# Patient Record
Sex: FEMALE | Race: ASIAN | Employment: UNEMPLOYED | URBAN - METROPOLITAN AREA
[De-identification: names, ages, dates, MRNs, and addresses within clinical notes are randomized per-mention and may not be internally consistent; named-entity substitution may affect disease eponyms.]

---

## 2017-05-09 ENCOUNTER — TRANSFERRED RECORDS (OUTPATIENT)
Dept: HEALTH INFORMATION MANAGEMENT | Facility: CLINIC | Age: 82
End: 2017-05-09

## 2017-05-22 DIAGNOSIS — K21.9 GASTROESOPHAGEAL REFLUX DISEASE WITHOUT ESOPHAGITIS: ICD-10-CM

## 2017-05-22 RX ORDER — NICOTINE POLACRILEX 4 MG/1
20 GUM, CHEWING ORAL 2 TIMES DAILY
Qty: 180 TABLET | Status: CANCELLED | OUTPATIENT
Start: 2017-05-22

## 2017-05-22 NOTE — TELEPHONE ENCOUNTER
Called and spoke with pharmacist ([facility]) and d/c refill per Dr. Patel.

## 2017-05-22 NOTE — TELEPHONE ENCOUNTER
Patient on BID omeprazole since 2014- started for GERD/gastritis, no GI bleed/ulcer on chart review.  Recommend stopping PPI, can try H2 blocker if having reflux symptoms.    Schedule appt if questions/concerns    ORCHELLE martinez

## 2017-05-25 ENCOUNTER — OFFICE VISIT (OUTPATIENT)
Dept: FAMILY MEDICINE | Facility: CLINIC | Age: 82
End: 2017-05-25

## 2017-05-25 VITALS
OXYGEN SATURATION: 100 % | BODY MASS INDEX: 23.28 KG/M2 | TEMPERATURE: 97.8 F | WEIGHT: 102 LBS | HEART RATE: 54 BPM | DIASTOLIC BLOOD PRESSURE: 64 MMHG | RESPIRATION RATE: 16 BRPM | SYSTOLIC BLOOD PRESSURE: 167 MMHG

## 2017-05-25 DIAGNOSIS — M17.12 PRIMARY OSTEOARTHRITIS OF LEFT KNEE: Primary | ICD-10-CM

## 2017-05-25 NOTE — NURSING NOTE
DUE FOR:  Eye Exam referral  Labs due:A1C, Lipids, Urine Microalbumin, Creatinine - pt states not on any dm meds for a while now. Glucose around the 90s.  DM Foot exam  PHQ9  PCV13     name: Dariel Rodney Her  Language: Hmong  Agency: [employer]  Phone number: [phone]    The following medication was given:     MEDICATION: Kenalog 40 mg  SITE: Left Knee  DOSE: 40 mg  LOT #: [id number]  :  [place]  EXPIRATION DATE:  9/2018  NDC#: 0748-3504-62    NOTE: GIVEN BY PROVIDER.

## 2017-05-25 NOTE — PROGRESS NOTES
Vignesh Fallon presents with left knee pain, known osteoarthritis.  She had left knee steroid injection 1 year ago with significant pain relief and would like repeat injection today.     Consent: Affirmation of informed consent was signed and scanned into the medical record. Risks, benefits and alternatives were discussed. Patient's questions were elicited and answered.   Procedure safety checklist was completed:             Yes  Time Out (Pause for the Cause) completed:            Yes     The left knee was prepped  in the usual fashion. Significant changes of OA noted in bilateral knees, making landmarks somewhat difficult to identify.     INJECTION:    Using 3 cc of 1% lidocaine mixed with 1 ml of 40 mg of Kenalog, the knee joint was successfully injected without complication using a lateral peripatellar approach.  Patient experienced some relief of pain following injection.  Bandaid applied.  Routine care discussed.        Follow up: Patient was instructed to call if severe pain, redness, warmth or other concerns    Luz Maria Patel PGY3  Faculty:  Dr Redding present for entire procedure

## 2017-05-25 NOTE — PROGRESS NOTES
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)     Esophageal reflux     Goiter     Displacement of lumbar intervertebral disc without myelopathy     Herpes zoster     Essential hypertension     Hypoglycemia     Stiffness of joint, not elsewhere classified,  shoulder region     Calculus of kidney     Osteoarthritis     Osteoarthrosis, unspecified whether generalized or localized, involving lower leg     Plantar fascial fibromatosis     Diabetes mellitus, type 2 (H)     Antalgic gait       Current Outpatient Prescriptions   Medication Sig Dispense Refill     omeprazole 20 MG tablet Take 1 tablet (20 mg) by mouth 2 times daily Take 30-60 minutes before a meal. 180 tablet 1     gabapentin (NEURONTIN) 600 MG tablet Take 1 tablet (600 mg) by mouth 2 times daily 90 tablet 2     Nutritional Supplements (GLUCERNA 1.0 ISAIAH/FIBER) LIQD Take 1 Can by mouth 2 times daily 54282 mL 3     order for DME Equipment being ordered: Wheelchair 1 Device 0     aspirin 81 MG tablet Take 1 tablet (81 mg) by mouth daily 30 tablet 6     order for DME Equipment being ordered: Wheelchair 1 Device 0     Nutritional Supplements (ENSURE NUTRITION SHAKE) LIQD Take 1 Bottle by mouth 2 times daily 60 Bottle 3     Multiple Vitamins-Iron (DAILY-CHRIS/IRON) TABS Take 1 tablet by mouth daily 30 tablet 6     lisinopril (PRINIVIL,ZESTRIL) 2.5 MG tablet Take 1 tablet (2.5 mg) by mouth daily 30 tablet 6     metFORMIN (GLUCOPHAGE) 500 MG tablet Take  1 tablet (500 mg) by mouth daily (with breakfast) 60 tablet 6     diclofenac (VOLTAREN) 1 % GEL Apply 4 grams to knees or 2 grams to hands four times daily using enclosed dosing card. 100 g 1     ORDER FOR DME, SET TO LOCAL PRINT, Equipment being ordered: Crutches 1 Units 0     blood glucose test strip 1 strip by In Vitro route 2 times daily Use as directed 100 strip 2     ORDER FOR DME Equipment being ordered: walker 1 Device 0     Blood Glucose Monitoring Suppl (DoubleUp CONTOUR MONITOR) W/DEVICE KIT Check fasting glucose 4 times per day at meals and before bed. 1 kit 0     acetaminophen (TYLENOL) 325 MG tablet Take 2 tablets (650 mg) by mouth every 4 hours as needed for pain 100 tablet 0     Cholecalciferol (VITAMIN D) 2000 UNITS CAPS Take 1 capsule by mouth daily.       glucose blood VI test strips (Otelic CONTOUR NEXT TEST) strip Take test 2 times per day daily.       docusate sodium (COLACE) 100 MG capsule Take i capsule twice daily as needed for constipation.       MICROLET LANCETS MISC Use as directed.            Allergies   Allergen Reactions     No Known Allergies        No results found for this or any previous visit (from the past 24 hour(s)).         Review of Systems:   {ROS ACUTE:136468}          Physical Exam:     Vitals:    05/25/17 0951   BP: 167/64   Pulse: 54   Resp: 16   Temp: 97.8  F (36.6  C)   TempSrc: Oral   SpO2: 100%   Weight: 102 lb (46.3 kg)     Body mass index is 23.28 kg/(m^2).    {Exam Choice:284717}{Exam Choice:239744}    Office Visit on 11/06/2015   Component Date Value Ref Range Status     CK, Total 11/06/2015 61  30 - 190 U/L Final     Sed Rate 11/06/2015 28* 0 - 20 mm/hr Final     C-Reactive Protein 11/06/2015 0.5  0.0 - 0.8 mg/dL Final     WBC 11/06/2015 6.0  4.0 - 11.0 K/uL Final     Lymphocytes # 11/06/2015 1.4  0.8 - 5.3 K/uL Final     % Lymphocytes 11/06/2015 23.1  20.0 - 48.0 %L Final     Mid # 11/06/2015 0.4  0.0 - 2.2 K/uL Final     Mid % 11/06/2015 6.3  0.0 - 20.0 %M  Final     GRANULOCYTES # 11/06/2015 4.2  1.6 - 8.3 K/uL Final     % Granulocytes 11/06/2015 70.6  40.0 - 75.0 %G Final     RBC 11/06/2015 3.82  3.80 - 5.20 M/uL Final     Hemoglobin 11/06/2015 10.4* 11.7 - 15.7 g/dL Final     Hematocrit 11/06/2015 33.2* 35.0 - 47.0 % Final     MCV 11/06/2015 86.9  78.0 - 100.0 fL Final     MCH 11/06/2015 27.2  26.5 - 35.0 pg Final     MCHC 11/06/2015 31.3* 32.0 - 36.0 g/dL Final     Platelets 11/06/2015 287.0  150.0 - 450.0 K/uL Final     Glucose 11/06/2015 135.0* 60.0 - 109.0 mg/dL Final     Urea Nitrogen 11/06/2015 35.0* 7.0 - 30.0 mg/dL Final     Creatinine 11/06/2015 1.9* 0.6 - 1.3 mg/dL Final     Sodium 11/06/2015 132.0* 133.0 - 144.0 mmol/L Final     Potassium 11/06/2015 4.0  3.4 - 5.3 mmol/L Final     Chloride 11/06/2015 104.0  94.0 - 109.0 mmol/L Final     Carbon Dioxide 11/06/2015 24.0  20.0 - 32.0 mmol/L Final     Calcium 11/06/2015 8.8  8.5 - 10.4 mg/dL Final     eGFR Calculated (Non Black Referen* 11/06/2015 27.0* >60.0 mL/min Final     eGFR Calculated (Black Reference) 11/06/2015 32.7* >60.0 mL/min Final     Hemoglobin A1C 11/06/2015 6.5* 4.1 - 5.7 % Final     TSH 11/06/2015 0.18* 0.30 - 5.00 uIU/mL Final     Cholesterol 11/06/2015 129.0  <200.0 mg/dL Final     Triglycerides 11/06/2015 68.0  <150.0 mg/dL Final     HDL Cholesterol 11/06/2015 47.0* >50.0 mg/dL Final    If diabetic or CVD then reference range <100     VLDL-Cholesterol 11/06/2015 14.0  7.0 - 32.0 mg/dL Final     LDL Cholesterol Direct 11/06/2015 68.0  0.0 - 99.0 mg/dL Final     Cholesterol/HDL Ratio 11/06/2015 2.8  <5.0 RATIO Final       Assessment and Plan     {DM DIAG PICKLIST:630428}    Options for treatment and follow-up care were reviewed with the patient and/or guardian. Vignesh Leeanne and/or guardian engaged in the decision making process and verbalized understanding of the options discussed and agreed with the final plan.      Luz Maria Patel MD      Precepted today with: {PVPRECEPTORS:555713}

## 2017-05-25 NOTE — MR AVS SNAPSHOT
After Visit Summary   2017    Vignesh Fallon    MRN: [id number]           Patient Information     Date Of Birth          1935        Visit Information        Provider Department      2017 9:40 AM Luz Maria Patel MD [facility]        Today's Diagnoses     Primary osteoarthritis of left knee    -  1       Follow-ups after your visit        Who to contact     Please call your clinic at [phone] to:    Ask questions about your health    Make or cancel appointments    Discuss your medicines    Learn about your test results    Speak to your doctor   If you have compliments or concerns about an experience at your clinic, or if you wish to file a complaint, please contact [facility] Patient Relations at [phone] or email us at [email or web address]         Additional Information About Your Visit        MyChart Information     [employer] is an electronic gateway that provides easy, online access to your medical records. With [employer], you can request a clinic appointment, read your test results, renew a prescription or communicate with your care team.     To sign up for [employer] visit the website at [email or web address]   You will be asked to enter the access code listed below, as well as some personal information. Please follow the directions to create your username and password.     Your access code is: [email or web address]  Expires: 2017  4:58 PM     Your access code will  in 90 days. If you need help or a new code, please contact your [facility] or call [phone] for assistance.        Care EveryWhere ID     This is your Care EveryWhere ID. This could be used by other organizations to access your [place] medical records  [id number]        Your Vitals Were     Pulse Temperature Respirations Pulse Oximetry BMI (Body Mass Index)       54 97.8  F (36.6  C) (Oral) 16 100% 23.28 kg/m2        Blood  Pressure from Last 3 Encounters:   05/25/17 167/64   05/05/16 128/67   11/24/15 126/78    Weight from Last 3 Encounters:   05/25/17 102 lb (46.3 kg)   05/05/16 104 lb 12.8 oz (47.5 kg)   11/24/15 101 lb 3.2 oz (45.9 kg)              Today, you had the following     No orders found for display       Primary Care Provider Office Phone # Fax #    Luz Maria Cherry Patel [phone]       [facility] [address] [place] [address]        Thank you!     Thank you for choosing [facility]  for your care. Our goal is always to provide you with excellent care. Hearing back from our patients is one way we can continue to improve our services. Please take a few minutes to complete the written survey that you may receive in the mail after your visit with us. Thank you!             Your Updated Medication List - Protect others around you: Learn how to safely use, store and throw away your medicines at www.disposemymeds.org.          This list is accurate as of: 5/25/17  4:58 PM.  Always use your most recent med list.                   Brand Name Dispense Instructions for use    acetaminophen 325 MG tablet    TYLENOL    100 tablet    Take 2 tablets (650 mg) by mouth every 4 hours as needed for pain       aspirin 81 MG tablet     30 tablet    Take 1 tablet (81 mg) by mouth daily       * SPENCER CONTOUR NEXT test strip   Generic drug:  blood glucose monitoring      Take test 2 times per day daily.       * blood glucose monitoring test strip    no brand specified    100 strip    1 strip by In Vitro route 2 times daily Use as directed       blood glucose monitoring meter device kit     1 kit    Check fasting glucose 4 times per day at meals and before bed.       DAILY-CHRIS/IRON Tabs     30 tablet    Take 1 tablet by mouth daily       diclofenac 1 % Gel topical gel    VOLTAREN    100 g    Apply 4 grams to knees or 2 grams to hands four times daily using enclosed dosing card.       docusate sodium  100 MG capsule    COLACE     Take i capsule twice daily as needed for constipation.       * ENSURE NUTRITION SHAKE Liqd     60 Bottle    Take 1 Bottle by mouth 2 times daily       * GLUCERNA 1.0 ISAIAH/FIBER Liqd     97503 mL    Take 1 Can by mouth 2 times daily       gabapentin 600 MG tablet    NEURONTIN    90 tablet    Take 1 tablet (600 mg) by mouth 2 times daily       lisinopril 2.5 MG tablet    PRINIVIL/Zestril    30 tablet    Take 1 tablet (2.5 mg) by mouth daily       metFORMIN 500 MG tablet    GLUCOPHAGE    60 tablet    Take 1 tablet (500 mg) by mouth daily (with breakfast)       MICROLET LANCETS Misc      Use as directed.       omeprazole 20 MG tablet     180 tablet    Take 1 tablet (20 mg) by mouth 2 times daily Take 30-60 minutes before a meal.       * order for DME     1 Device    Equipment being ordered: walker       * order for DME     1 Units    Equipment being ordered: Crutches       * order for DME     1 Device    Equipment being ordered: Wheelchair       * order for DME     1 Device    Equipment being ordered: Wheelchair       vitamin D 2000 UNITS Caps      Take 1 capsule by mouth daily.       * Notice:  This list has 8 medication(s) that are the same as other medications prescribed for you. Read the directions carefully, and ask your doctor or other care provider to review them with you.

## 2017-05-25 NOTE — PROGRESS NOTES
Preceptor Attestation:  Patient's case reviewed and discussed with Luz Maria Patel MD Patient seen and discussed with the resident. and I was present for and supervised the entire procedure. I agree with assessment and plan of care.  Supervising Physician:  Noemí Redding DO  [facility]

## 2017-05-31 ENCOUNTER — DOCUMENTATION ONLY (OUTPATIENT)
Dept: FAMILY MEDICINE | Facility: CLINIC | Age: 82
End: 2017-05-31

## 2017-06-05 NOTE — PROGRESS NOTES
Visit to the client's home for annual health risk assessment and PCA reassessment.  An  was not needed.    Current situation/living environment/hospitalization: Vignesh is 82 yo Hmong female with h/o Antalgic Gait, DM II, OA, CKD III and Joint Stiffness. She continues to live with son and DIL. Her primary concern is OA pain in both knees which often becomes so severe she is unable to bear weight on it. Recently received Kenalog injection to left knee which is helping but right knee is still in pain. Rainer continues to walk hunched forward at almost 90 degree angle using a crutch under left armpit at all times. Denies any recent falls/hospitalizations. CM suspects poor med compliance and offered SNV along with PT. Clt would like CM to discuss with son, Ryland, before continuing. Also c/o constant fatigue and significant decrease in hearing this year. Hearing loss was evident when compared to previous assessments. Rainer declined hearing exam at this time. Vignesh has maintained weight via Glucerna 1-2x/day. Rainer is tearful today stating she still does not feel family is as active in her life as she d prefer. Rainer doesn t feel there is anything CM can do about this and is content with going to [facility] to help with feelings of isolation. Otherwise, no major changes to health and functionality when compared to last year.    Activities of daily living (ADL)/instrumental activities of daily living (IADL) and functional issues: Rainer is dependent on others for help with some ADLs such as dressing, bathing, mobility and transferring. She is also dependent on family for help with all IADLs.    Health concerns/updates: CM suspects poor med compliance and education. Today, rainer only had Omeprazole and Gabapentin to which she states she only takes Omeprazole. She doesn't believe she has any other meds. Discussed benefits of SNV for med management.    Additional info: Per [facility] request, CM will discuss with her son (Ryland) about initiating  SNV and PT services and see what he feels is best for Vignesh. If he agrees to these services, CM will initiate request. Clt reports that she has also run out of Glucerna as she needs new script. She is asking for CM to assist with this.    Client's Plan of Care consists of:  PCA (3.5 hours a day), Adult day care (5 days a month) and Supplies and equipment as needed  Discuss initiation of SNV and PT services with family member  Assist clt with changing pharmacy to one that will auto refill and deliver to clt's home  Assist with restarting Glucerna.  F/U in 6 month or PRN    Brady Freeman RN PHN  [facility] Managed Care Coordinator  [phone]

## 2017-06-14 ENCOUNTER — HOME CARE/HOSPICE - HEALTHEAST (OUTPATIENT)
Dept: HOME HEALTH SERVICES | Facility: HOME HEALTH | Age: 82
End: 2017-06-14

## 2017-06-14 DIAGNOSIS — K21.9 GASTROESOPHAGEAL REFLUX DISEASE WITHOUT ESOPHAGITIS: ICD-10-CM

## 2017-06-15 ENCOUNTER — TELEPHONE (OUTPATIENT)
Dept: FAMILY MEDICINE | Facility: CLINIC | Age: 82
End: 2017-06-15

## 2017-06-15 RX ORDER — NICOTINE POLACRILEX 4 MG/1
20 GUM, CHEWING ORAL 2 TIMES DAILY
Qty: 180 TABLET | Refills: 1 | Status: SHIPPED | OUTPATIENT
Start: 2017-06-15 | End: 2017-06-19

## 2017-06-15 NOTE — TELEPHONE ENCOUNTER
[facility] phone call message - order or referral request for patient:     Order or referral being requested: Need 2  Recent clinic notes and med list for the order that they had received.      Additional Comments: They received an order for skilled nursing but needs above fax to . Please call and advise.     OK to leave a message on voice mail? Yes    Primary language: Hmong      needed? Yes    Call taken on Ayana 15, 2017 at 12:18 PM by Rafiq Scott

## 2017-06-15 NOTE — TELEPHONE ENCOUNTER
Reviewed. This order was already faxed to [facility].     Called [facility] back and spoke with Jolanta, [facility]. Per Jolanta order received yesterday from clinic.     Informed Jolanta to have [facility] disregard order.

## 2017-06-16 ENCOUNTER — TELEPHONE (OUTPATIENT)
Dept: FAMILY MEDICINE | Facility: CLINIC | Age: 82
End: 2017-06-16

## 2017-06-16 NOTE — TELEPHONE ENCOUNTER
[facility] phone call message - order or referral request for patient:     Order or referral being requested: Verbal okay to start home care tomorrow instead of today, family is okay with that.       Additional Comments: Calling for order above. Please call and advise.     OK to leave a message on voice mail? Yes    Primary language: ong      needed? Yes    Call taken on June 16, 2017 at 10:12 AM by Rafiq Scott

## 2017-06-16 NOTE — TELEPHONE ENCOUNTER
Called, left detailed message on voice mail for Caitlin. Ok to delay start of home care until tomorrow 6/17/17. Iza XIONG

## 2017-06-17 ENCOUNTER — MEDICAL CORRESPONDENCE (OUTPATIENT)
Dept: HEALTH INFORMATION MANAGEMENT | Facility: CLINIC | Age: 82
End: 2017-06-17

## 2017-06-17 ENCOUNTER — DOCUMENTATION ONLY (OUTPATIENT)
Dept: CARE COORDINATION | Facility: CLINIC | Age: 82
End: 2017-06-17

## 2017-06-17 NOTE — PROGRESS NOTES
.[facility] now requests orders and shares plan of care/discharge summaries for some patients through [facility].  Please REPLY TO THIS MESSAGE in order to give authorization for orders when needed.  This is considered a verbal order, you will still receive a faxed copy of orders for signature.  Thank you for your assistance in improving collaboration for our patients.    ORDER.skill nursing 1 week 1, 2 week 2, 1 every other week 6 and 4 prn to assess/teach disease and symptoms mgmt, medication mgmt including setup and compliance, safety with mobility d/t increase fall risks, effective pain mgmt, nutrition/hydration, and skin integrity. PT to eval/treat safety with mobility, equipment needs, exercises, and fall preventions.    MD SUMMARY/PLAN OF CARE. Pt takes only Omeprazole. Clinician looked into epic and noticed other medications listed in profile. Need complete medication reconciliation, Rxs send to [facility] pharmacy to refill medications, and for pt to have assistant with setup. Will send details of summary on certification.    DISCHARGE SUMMARY    Please contact Steffanie Silva at [phone] with an update on complete med rec.

## 2017-06-19 ENCOUNTER — OFFICE VISIT (OUTPATIENT)
Dept: FAMILY MEDICINE | Facility: CLINIC | Age: 82
End: 2017-06-19

## 2017-06-19 ENCOUNTER — TELEPHONE (OUTPATIENT)
Dept: FAMILY MEDICINE | Facility: CLINIC | Age: 82
End: 2017-06-19

## 2017-06-19 VITALS
HEART RATE: 70 BPM | TEMPERATURE: 98.3 F | DIASTOLIC BLOOD PRESSURE: 79 MMHG | SYSTOLIC BLOOD PRESSURE: 158 MMHG | WEIGHT: 100.8 LBS | OXYGEN SATURATION: 97 % | BODY MASS INDEX: 23.01 KG/M2 | RESPIRATION RATE: 18 BRPM

## 2017-06-19 DIAGNOSIS — K21.9 GASTROESOPHAGEAL REFLUX DISEASE WITHOUT ESOPHAGITIS: ICD-10-CM

## 2017-06-19 DIAGNOSIS — G89.29 CHRONIC LOW BACK PAIN WITHOUT SCIATICA, UNSPECIFIED BACK PAIN LATERALITY: Primary | ICD-10-CM

## 2017-06-19 DIAGNOSIS — M54.50 CHRONIC LOW BACK PAIN WITHOUT SCIATICA, UNSPECIFIED BACK PAIN LATERALITY: Primary | ICD-10-CM

## 2017-06-19 DIAGNOSIS — G62.9 NEUROPATHY: ICD-10-CM

## 2017-06-19 RX ORDER — GABAPENTIN 600 MG/1
600 TABLET ORAL 2 TIMES DAILY
Qty: 90 TABLET | Status: CANCELLED | OUTPATIENT
Start: 2017-06-19

## 2017-06-19 RX ORDER — TRIAMCINOLONE ACETONIDE 40 MG/ML
40 INJECTION, SUSPENSION INTRA-ARTICULAR; INTRAMUSCULAR ONCE
Qty: 1 ML | Refills: 0 | OUTPATIENT
Start: 2017-06-19 | End: 2017-06-19

## 2017-06-19 RX ORDER — ACETAMINOPHEN 325 MG/1
650 TABLET ORAL EVERY 4 HOURS PRN
Qty: 100 TABLET | Refills: 0 | Status: SHIPPED | OUTPATIENT
Start: 2017-06-19 | End: 2017-08-17

## 2017-06-19 RX ORDER — NICOTINE POLACRILEX 4 MG/1
20 GUM, CHEWING ORAL DAILY PRN
Qty: 180 TABLET | Refills: 1 | COMMUNITY
Start: 2017-06-19 | End: 2017-11-30

## 2017-06-19 NOTE — PROGRESS NOTES
HPI:       Vignesh Fallon is a 82 year old  female with a significant past medical history of CKD, osteoarthritis of knees who presents for the new concern(s) of:    #Medication update:  Has home care starting, clinic received request from home care regarding refills of patient's medications, however clinic chart did not match pill bottles at home  -Brought her pills bottle, only has omeprazole- uses this as needed for reflux symptoms, does not take daily.  This is working well for her  -Patient states she is not interested in lab work today but will consider for next visit  -Feels well other than pain in her back and her right knee      #Osteoarthritis:  Right knee pain, s/p left knee injection 5/25/17 with great relief, requesting injection of right knee today           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 180 tablet 1     acetaminophen (TYLENOL) 325 MG tablet Take 2 tablets (650 mg) by mouth every 4 hours as needed for mild pain 100 tablet 0     order for DME  Equipment being ordered: Wheelchair 1 Device 0     order for DME Equipment being ordered: Wheelchair 1 Device 0     Nutritional Supplements (ENSURE NUTRITION SHAKE) LIQD Take 1 Bottle by mouth 2 times daily 60 Bottle 3     ORDER FOR DME, SET TO LOCAL PRINT, Equipment being ordered: Crutches 1 Units 0     ORDER FOR DME Equipment being ordered: walker 1 Device 0          Allergies   Allergen Reactions     No Known Allergies        No results found for this or any previous visit (from the past 24 hour(s)).         Review of Systems:   5-Point Review of Systems Negative -- Except as noted above.          Physical Exam:     Vitals:    06/19/17 1555 06/19/17 1602   BP: 172/69 158/79   Pulse: 70    Resp: 18    Temp: 98.3  F (36.8  C)    SpO2: 97%    Weight: 100 lb 12.8 oz (45.7 kg)      Body mass index is 23.01 kg/(m^2).    Gen alert pleasant NAD  HEENT large 5 cm goiter   Heart rrr no murmurs  Lungs clear no wheezing  Ext degenerative changes bilateral knees, no effusion or erythema  Neuro alert and oriented x 3, ambulates with use of cane    Assessment and Plan     Medication management:  Clinic chart updated with patient's meds- omeprazole daily prn and tylenol prn.  Updated list will be faxed to her home care provider.  Discussed option for blood work today to check on kidney function- patient declined stating she feels fine and would rather not have a blood draw- she will think about this for next time    Osteoarthritis, right knee:  Patient requesting knee injection today, see procedure note below      Vignesh Fallon presents with right knee pain, known osteoarthritis.  She had left knee steroid injection 2 months ago with significant pain relief and would like her right knee injected today.      Consent: Affirmation of informed consent was signed and scanned into the medical record. Risks, benefits and alternatives were discussed. Patient's questions were elicited and answered.   Procedure safety checklist was completed:              Yes  Time Out (Pause for the Cause) completed:            Yes      The right knee was prepped  in the usual fashion. Significant changes of OA noted in bilateral knees, making landmarks somewhat difficult to identify.      INJECTION:    Using 3 cc of 1% lidocaine mixed with 1 ml of 40 mg of Kenalog, the knee joint was successfully injected without complication using a lateral peripatellar approach.  Patient experienced some relief of pain following injection.  Bandaid applied.  Routine care discussed.          Follow up: Patient was instructed to call if severe pain, redness, warmth or other concerns    Options for treatment and follow-up care were reviewed with the patient and/or guardian. Vignesh Leeanne and/or guardian engaged in the decision making process and verbalized understanding of the options discussed and agreed with the final plan.      Luz Maria Patel MD      Precepted today with: Nain Roth MD

## 2017-06-19 NOTE — TELEPHONE ENCOUNTER
[facility] phone call message- general phone call:    Reason for call: She went to see Patient on Saturday to start home care but there was no medication at all at the home so she is wondering what medications Patient is taking and how she can go about medications to get it to her or have it at home for her and to contact the pharmacy to get medications for Patient. She states she put in a note in epic if the clinic can look at it. Please call and advise.     Return call needed: Yes    OK to leave a message on voice mail? Yes    Primary language: Hmong      needed? Yes    Call taken on June 19, 2017 at 10:22 AM by Rafiq Scott

## 2017-06-19 NOTE — MR AVS SNAPSHOT
After Visit Summary   2017    Vignesh Fallon    MRN: [id number]           Patient Information     Date Of Birth          1935        Visit Information        Provider Department      2017 3:40 PM Luz Maria Patel MD [facility]        Today's Diagnoses     Chronic low back pain without sciatica, unspecified back pain laterality    -  1    Gastroesophageal reflux disease without esophagitis        Osteoarthrosis involving lower leg           Follow-ups after your visit        Who to contact     Please call your clinic at [phone] to:    Ask questions about your health    Make or cancel appointments    Discuss your medicines    Learn about your test results    Speak to your doctor   If you have compliments or concerns about an experience at your clinic, or if you wish to file a complaint, please contact [facility] Patient Relations at [phone] or email us at [email or web address]         Additional Information About Your Visit        MyChart Information     PAKt is an electronic gateway that provides easy, online access to your medical records. With [employer], you can request a clinic appointment, read your test results, renew a prescription or communicate with your care team.     To sign up for PAKt visit the website at [email or web address]   You will be asked to enter the access code listed below, as well as some personal information. Please follow the directions to create your username and password.     Your access code is: [email or web address]  Expires: 2017  4:58 PM     Your access code will  in 90 days. If you need help or a new code, please contact your [facility] or call [phone] for assistance.        Care EveryWhere ID     This is your Care EveryWhere ID. This could be used by other organizations to access your [facility] medical records  [id number]        Your Vitals Were      Pulse Temperature Respirations Pulse Oximetry BMI (Body Mass Index)       70 98.3  F (36.8  C) 18 97% 23.01 kg/m2        Blood Pressure from Last 3 Encounters:   06/19/17 158/79   05/25/17 167/64   05/05/16 128/67    Weight from Last 3 Encounters:   06/19/17 100 lb 12.8 oz (45.7 kg)   05/25/17 102 lb (46.3 kg)   05/05/16 104 lb 12.8 oz (47.5 kg)              We Performed the Following     DRAIN/INJECT MEDIUM JOINT/BURSA     triamcinolone acetonide (KENALOG-40) injection 40 mg/mL (Charge)          Today's Medication Changes          These changes are accurate as of: 6/19/17 11:59 PM.  If you have any questions, ask your nurse or doctor.               These medicines have changed or have updated prescriptions.        Dose/Directions    acetaminophen 325 MG tablet   Commonly known as:  TYLENOL   This may have changed:  reasons to take this   Used for:  Chronic low back pain without sciatica, unspecified back pain laterality   Changed by:  Luz Maria Patel MD        Dose:  650 mg   Take 2 tablets (650 mg) by mouth every 4 hours as needed for mild pain   Quantity:  100 tablet   Refills:  0       ENSURE NUTRITION SHAKE Liqd   This may have changed:  Another medication with the same name was removed. Continue taking this medication, and follow the directions you see here.   Used for:  Hypoglycemia, unspecified   Changed by:  Luz Maria Patel MD        Dose:  1 Bottle   Take 1 Bottle by mouth 2 times daily   Quantity:  60 Bottle   Refills:  3       omeprazole 20 MG tablet   This may have changed:    - when to take this  - reasons to take this   Used for:  Gastroesophageal reflux disease without esophagitis   Changed by:  Luz Maria Patel MD        Dose:  20 mg   Take 1 tablet (20 mg) by mouth daily as needed Take 30-60 minutes before a meal.   Quantity:  180 tablet   Refills:  1       * triamcinolone acetonide 40 MG/ML injection   Commonly known as:  KENALOG-40   This may have changed:  You were already taking a  medication with the same name, and this prescription was added. Make sure you understand how and when to take each.   Used for:  Osteoarthrosis involving lower leg   Changed by:  Luz Maria Patel MD        Dose:  40 mg   1 mL (40 mg) by INTRA-ARTICULAR route once for 1 dose   Quantity:  1 mL   Refills:  0       * triamcinolone acetonide 40 MG/ML injection   Commonly known as:  KENALOG-40   This may have changed:  Another medication with the same name was added. Make sure you understand how and when to take each.   Used for:  Primary osteoarthritis of left knee   Changed by:  Luz Maria Patel MD        Dose:  40 mg   1 mL (40 mg) by INTRA-ARTICULAR route once for 1 dose   Quantity:  1 mL   Refills:  0       * Notice:  This list has 2 medication(s) that are the same as other medications prescribed for you. Read the directions carefully, and ask your doctor or other care provider to review them with you.      Stop taking these medicines if you haven't already. Please contact your care team if you have questions.     aspirin 81 MG tablet   Stopped by:  Luz Maria Patel MD           SPENCER CONTOUR NEXT test strip   Generic drug:  blood glucose monitoring   Stopped by:  Luz Maria Patel MD           blood glucose monitoring meter device kit   Stopped by:  Luz Maria Patel MD           blood glucose monitoring test strip   Commonly known as:  no brand specified   Stopped by:  Luz Maria Patel MD           DAILY-CHRIS/IRON Tabs   Stopped by:  Luz Maria Patel MD           diclofenac 1 % Gel topical gel   Commonly known as:  VOLTAREN   Stopped by:  Luz Maria Patel MD           docusate sodium 100 MG capsule   Commonly known as:  COLACE   Stopped by:  Luz Maria Patel MD           gabapentin 600 MG tablet   Commonly known as:  NEURONTIN   Stopped by:  Luz Maria Patel MD           lisinopril 2.5 MG tablet   Commonly known as:  PRINIVIL/Zestril   Stopped by:  Luz Maria Patel MD           metFORMIN 500 MG tablet    Commonly known as:  GLUCOPHAGE   Stopped by:  Luz Maria Patel MD           MICROLET LANCETS Misc   Stopped by:  Luz Maria Patel MD           vitamin D 2000 UNITS Caps   Stopped by:  Luz Maria Patel MD                Where to get your medicines      These medications were sent to [facility]/pharmacy #[id number] - [place], [place] - [address]  [address], [place] [address]    Hours:  24-hours Phone:  [phone]     acetaminophen 325 MG tablet         Some of these will need a paper prescription and others can be bought over the counter.  Ask your nurse if you have questions.     You don't need a prescription for these medications     triamcinolone acetonide 40 MG/ML injection                Primary Care Provider Office Phone # Fax #    Luz Maria Patel [phone]       [facility] [address] [place] [address]        Equal Access to Services     [facility] Center[facility] : Hadii anupam feng hadasho Soomaali, waaxda luqadaha, qaybta kaalmada adeegyada, waxay ashleyin haycrow garcia . So [facility] [phone].    ATENCIÓN: Si habla español, tiene a bonner disposición servicios gratuitos de asistencia lingüística. Llame al [phone].    We comply with applicable federal civil rights laws and [place] laws. We do not discriminate on the basis of race, color, national origin, age, disability sex, sexual orientation or gender identity.            Thank you!     Thank you for choosing [facility]  for your care. Our goal is always to provide you with excellent care. Hearing back from our patients is one way we can continue to improve our services. Please take a few minutes to complete the written survey that you may receive in the mail after your visit with us. Thank you!             Your Updated Medication List - Protect others around you: Learn how to safely use, store and throw away your medicines at www.disposemymeds.org.          This list is accurate as of: 6/19/17  11:59 PM.  Always use your most recent med list.                   Brand Name Dispense Instructions for use Diagnosis    acetaminophen 325 MG tablet    TYLENOL    100 tablet    Take 2 tablets (650 mg) by mouth every 4 hours as needed for mild pain    Chronic low back pain without sciatica, unspecified back pain laterality       ENSURE NUTRITION SHAKE Liqd     60 Bottle    Take 1 Bottle by mouth 2 times daily    Hypoglycemia, unspecified       omeprazole 20 MG tablet     180 tablet    Take 1 tablet (20 mg) by mouth daily as needed Take 30-60 minutes before a meal.    Gastroesophageal reflux disease without esophagitis       * order for DME     1 Device    Equipment being ordered: walker    Gait disturbance       * order for DME     1 Units    Equipment being ordered: Crutches    Preventative health care       * order for DME     1 Device    Equipment being ordered: Wheelchair    Pain of left lower leg       * order for DME     1 Device    Equipment being ordered: Wheelchair    Primary osteoarthritis involving multiple joints       * triamcinolone acetonide 40 MG/ML injection    KENALOG-40    1 mL    1 mL (40 mg) by INTRA-ARTICULAR route once for 1 dose    Osteoarthrosis involving lower leg       * triamcinolone acetonide 40 MG/ML injection    KENALOG-40    1 mL    1 mL (40 mg) by INTRA-ARTICULAR route once for 1 dose    Primary osteoarthritis of left knee       * Notice:  This list has 6 medication(s) that are the same as other medications prescribed for you. Read the directions carefully, and ask your doctor or other care provider to review them with you.

## 2017-06-19 NOTE — TELEPHONE ENCOUNTER
Per home care nurse's report of not seeing any medications at pt's home during visit, I called patient- no answer. I called her emergency contact/ her son- Placido who reports she takes her medications as prescribed but is out of meds and have not picked up refills from pharmacy yet. Verified pharmacy- as [facility] on [address]. I called [facility], pharmacist informs me last time Lisinopril and Metformin were picked up was  and rxs have now . Will route this encounter to Dr Patel for review and response. Please verify and indicate which medications pt should be on or continue. Would you like patient to come back in for re-evaluation to determine continued medication intake? Iza XIONG

## 2017-06-19 NOTE — TELEPHONE ENCOUNTER
Leif notified and will bring pt and all medications to appt this afternoon with Dr Patel. Once all medications are reviewed Dr Patel can determine medication refills. Iza XIONG

## 2017-06-19 NOTE — PROGRESS NOTES
Preceptor Attestation:  Patient's case reviewed and discussed with Luz Maria Patel MD  I agree with assessment and plan of care. I  Was present for the entire procedure.  Supervising Physician:  Nain Roth MD  [facility]

## 2017-06-21 ENCOUNTER — TRANSFERRED RECORDS (OUTPATIENT)
Dept: HEALTH INFORMATION MANAGEMENT | Facility: CLINIC | Age: 82
End: 2017-06-21

## 2017-06-26 RX ORDER — TRIAMCINOLONE ACETONIDE 40 MG/ML
40 INJECTION, SUSPENSION INTRA-ARTICULAR; INTRAMUSCULAR ONCE
Qty: 1 ML | Refills: 0 | OUTPATIENT
Start: 2017-06-26 | End: 2017-06-26

## 2017-08-03 ENCOUNTER — TRANSFERRED RECORDS (OUTPATIENT)
Dept: HEALTH INFORMATION MANAGEMENT | Facility: CLINIC | Age: 82
End: 2017-08-03

## 2017-08-03 ENCOUNTER — OFFICE VISIT (OUTPATIENT)
Dept: FAMILY MEDICINE | Facility: CLINIC | Age: 82
End: 2017-08-03

## 2017-08-03 VITALS
SYSTOLIC BLOOD PRESSURE: 180 MMHG | DIASTOLIC BLOOD PRESSURE: 68 MMHG | OXYGEN SATURATION: 99 % | TEMPERATURE: 97.6 F | HEART RATE: 53 BPM

## 2017-08-03 DIAGNOSIS — M54.5 ACUTE MIDLINE LOW BACK PAIN, WITH SCIATICA PRESENCE UNSPECIFIED: Primary | ICD-10-CM

## 2017-08-03 DIAGNOSIS — M48.50XA PATHOLOGIC COMPRESSION FRACTURE OF SPINE, INITIAL ENCOUNTER (H): ICD-10-CM

## 2017-08-03 RX ORDER — ACETAMINOPHEN 325 MG/1
325 TABLET ORAL EVERY 6 HOURS PRN
Qty: 100 TABLET | Refills: 0 | Status: SHIPPED | OUTPATIENT
Start: 2017-08-03 | End: 2018-03-23

## 2017-08-03 RX ORDER — ACETAMINOPHEN 325 MG/1
650 TABLET ORAL ONCE
Qty: 2 TABLET | Refills: 0
Start: 2017-08-03 | End: 2017-08-03

## 2017-08-03 NOTE — PATIENT INSTRUCTIONS
Take 325mg Tylenol (1 pill) every 6 hours scheduled - 6am, Noon, 6pm, midnight   You may take 2 before bed if needed    Come back sooner or go to the ED if you lose feeling in your leg(s), unable to urinate, severe back pain that isn't controlled with tylenol, or high fevers.

## 2017-08-03 NOTE — PROGRESS NOTES
"       HPI:       Vignesh Fallon is a 82 year old  female with complex medical hx significant for CKD, T2DM, osteoporosis, and renal calculous who presents for the following:    Back Pain:   - She's had severe pain for the last week.   - Started a month ago, but worsened in the last week.   - States: \"Feels like my back is broken\"   - Describes as a severe, sharp pain -- it was dull earlier in the month became sharp a week ago. Now it's dull when not moving, sharp with movement.   - Very difficult to walk, move around, and sleep  - Points to location as top of hip on left back side.   - Wasn't doing anything specific when got worse, no falls no known injuries.   - Hasn't had pain this bad in the past. Had kidney stone before, pain wasn't as bad and was higher  - Tried local anesthetic last night which allowed her to sleep. Tylenol helped temporarily - 325mg.   - Has been eating and drinking fluids normally, does not believe she's lost weight.  - No shooting pain, no numbness. No fevers, chills, nausea/vomiting, dysuria/hematuria, numbness or tingling.    T2DM:   - Medications: No medications, diet controlled currently  - Glucose checks: checks in the mornings, usually between , does experience symptomatic lows about 1-2x/month  - No secondary symptoms such as paresthesias, blurry vision (says vision is not good anyway), no open sores    A [facility]IN[facility]  was used for this visit         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 180 tablet 1     acetaminophen (TYLENOL) 325 MG tablet Take 2 tablets (650 mg) by mouth every 4 hours as needed for mild pain 100 tablet 0     order for DME Equipment being ordered: Wheelchair 1 Device 0     order for DME Equipment being ordered: Wheelchair 1 Device 0     Nutritional Supplements (ENSURE NUTRITION SHAKE) LIQD Take 1 Bottle by mouth 2 times daily 60 Bottle 3     ORDER FOR DME, SET TO LOCAL PRINT, Equipment being ordered: Crutches 1 Units 0     ORDER FOR DME Equipment being ordered: walker 1 Device 0       Social History     Social History     Marital status:      Spouse name: N/A     Number of children: N/A     Years of education: N/A     Occupational History     Not on file.     Social History Main Topics     Smoking status: Never Smoker     Smokeless tobacco: Not on file     Alcohol use No     Drug use: No     Sexual activity: Not on file     Other Topics Concern     Not on file     Social History Narrative   Living with son       Allergies   Allergen Reactions     No Known Allergies             Review of Systems:      ROS: 10 point ROS neg other than the symptoms noted above in the HPI.          Physical Exam:     Vitals:    08/03/17 0958   BP: 180/68   Pulse: 53   Temp: 97.6  F (36.4  C)   TempSrc: Oral   SpO2: 99%   BP Recheck 147/68 - she had difficulty walking and lots of pain trying to get to the exam room    GENERAL APPEARANCE: Seated in obvious discomfort, leaning over most of the time  EYES: moderate injection bilaterally of  cornea, clouding over the lens, senile arcus  NECK: enlargement of the thyroid on right side  RESP: Normal WOB, lungs clear to auscultation - no rales, rhonchi or wheezes  CV: Bradycardic with normal rhythm, normal S1 S2, no S3 or S4, no murmur, click or rub  ABDOMEN: soft, nontender, without hepatosplenomegaly or masses, bowel sounds normal  MS: extremities normal- no gross deformities noted, upper extremities with normal active range of motion, passive ROM in LE normal but unable to stand 2/2 pain, no pain with straightening of pt's legs when she was seated, no pain with palpation of spinal processes, lower back muscles tight b/l without pain with palpation, limited ROM of the spine    SKIN: no suspicious lesions or rashes and no ecchymoses; no open sores of the feet  NEURO: Alert, interactive, good memory and speech; CN II-XII grossly intacty; 5/5 strength in proximal and distal upper extremities; 5/5 strength in dorsiflexion of foot (rest of exam limited by pain); normal biceps and patellar reflexes, babinski down-going b/l; no loss of sensation to light touch in all extremities b/l    Imaging  My interpretation: Spine XR - Prominent osteoarthritic changes with spurring and vertebral body calcification, multiple vertebrae showing shortening, disc space shortening particularly L4/L5 and L3/L4 space, spondylolisthesis of L5/S1 and T12/L1 spaces. Acute angle change at T12 with vertebral body shortening likely represents a compression fracture. No other areas appear to be fractures  Official read pending    Assessment and Plan     Vignesh Fallon is an 83 yo female with pmhx including osteoporosis, vertebral fractures, CKD, T2DM, and renal stones who came to clinic for acute back pain. Notably, she was recently diagnosed with latent TB and is not taking medication, electing for repeat XRs. As this seemed to be an acute change on top of chronic back pain which limited mobility to a great extent, I felt imaging of the spine  was indicated due to concerns for fracture > spasm or renal stone. I also considered the possibility of TB osteomyelitis (as she has latent TB and is not taking medication) vs other osteo vs malignancy, though I had low suspicion as she had no recent weight loss, fevers, B-symptoms.     Acute midline low back pain, with sciatica presence unspecified: gave 650 tylenol prior to XR due to patient's discomfort  -     acetaminophen (TYLENOL) 325 MG tablet; Take 2 tablets (650 mg) by mouth once for 1 dose  -     XR PELVIS INCL HIP BILATERAL G/E 2 VW  -     XR Lumbar Spine 2-3 Views*    Pathologic compression fracture of spine, initial encounter (H): On my read, acute angle change and compression of L1 with fracture. Unprovoked fracture w/o neurologic compromise likely 2/2 osteoporosis. Tylenol has been effective already and would like to avoid narcotics with her age and medical history.  -     acetaminophen (TYLENOL) 325 MG tablet; Take 1 tablet (325 mg) by mouth every 6 hours as needed for mild pain (severe pain)    Options for treatment and follow-up care were reviewed with the patient and son. Vignesh Fallon and son engaged in the decision making process and verbalized understanding of the options discussed and agreed with the final plan.    Benjamin Rosenstein, MD, MA  [facility]  P: [phone]      Precepted today with: Navi Izaguirre MD

## 2017-08-03 NOTE — MR AVS SNAPSHOT
After Visit Summary   8/3/2017    Vignesh Fallon    MRN: [id number]           Patient Information     Date Of Birth          1935        Visit Information        Provider Department      8/3/2017 9:40 AM Rosenstein, Benjamin, MD [facility]        Today's Diagnoses     Acute midline low back pain, with sciatica presence unspecified    -  1    Pathologic compression fracture of spine, initial encounter (H)          Care Instructions    Take 325mg Tylenol (1 pill) every 6 hours scheduled - 6am, Noon, 6pm, midnight   You may take 2 before bed if needed    Come back sooner or go to the ED if you lose feeling in your leg(s), unable to urinate, severe back pain that isn't controlled with tylenol, or high fevers.           Follow-ups after your visit        Who to contact     Please call your clinic at [phone] to:    Ask questions about your health    Make or cancel appointments    Discuss your medicines    Learn about your test results    Speak to your doctor   If you have compliments or concerns about an experience at your clinic, or if you wish to file a complaint, please contact [facility] Patient Relations at [phone] or email us at [email or web address]         Additional Information About Your Visit        MyChart Information     [employer] is an electronic gateway that provides easy, online access to your medical records. With [employer], you can request a clinic appointment, read your test results, renew a prescription or communicate with your care team.     To sign up for [employer] visit the website at [email or web address]   You will be asked to enter the access code listed below, as well as some personal information. Please follow the directions to create your username and password.     Your access code is: [email or web address]  Expires: 2017  4:58 PM     Your access code will  in 90 days. If you need help or a new code, please contact your  [facility] or call [phone] for assistance.        Care EveryWhere ID     This is your Care EveryWhere ID. This could be used by other organizations to access your [place] medical records  [id number]        Your Vitals Were     Pulse Temperature Pulse Oximetry             53 97.6  F (36.4  C) (Oral) 99%          Blood Pressure from Last 3 Encounters:   08/03/17 180/68   06/19/17 158/79   05/25/17 167/64    Weight from Last 3 Encounters:   06/19/17 100 lb 12.8 oz (45.7 kg)   05/25/17 102 lb (46.3 kg)   05/05/16 104 lb 12.8 oz (47.5 kg)              We Performed the Following     XR Lumbar Spine 2-3 Views*     XR PELVIS INCL HIP BILATERAL G/E 2 VW          Today's Medication Changes          These changes are accurate as of: 8/3/17 12:01 PM.  If you have any questions, ask your nurse or doctor.               These medicines have changed or have updated prescriptions.        Dose/Directions    * acetaminophen 325 MG tablet   Commonly known as:  TYLENOL   This may have changed:  Another medication with the same name was added. Make sure you understand how and when to take each.   Used for:  Chronic low back pain without sciatica, unspecified back pain laterality   Changed by:  Luz Maria Patel MD        Dose:  650 mg   Take 2 tablets (650 mg) by mouth every 4 hours as needed for mild pain   Quantity:  100 tablet   Refills:  0       * acetaminophen 325 MG tablet   Commonly known as:  TYLENOL   This may have changed:  You were already taking a medication with the same name, and this prescription was added. Make sure you understand how and when to take each.   Used for:  Acute midline low back pain, with sciatica presence unspecified   Changed by:  Rosenstein, Benjamin, MD        Dose:  650 mg   Take 2 tablets (650 mg) by mouth once for 1 dose   Quantity:  2 tablet   Refills:  0       * Notice:  This list has 2 medication(s) that are the same as other medications prescribed for you.  Read the directions carefully, and ask your doctor or other care provider to review them with you.         Where to get your medicines      Some of these will need a paper prescription and others can be bought over the counter.  Ask your nurse if you have questions.     You don't need a prescription for these medications     acetaminophen 325 MG tablet                Primary Care Provider Office Phone # Fax #    Hope Tinsley [phone]       [facility] [address] [place] [address]        Equal Access to Services     [facility] NURA : Hadii aad ku hadasho Soomaali, waaxda luqadaha, qaybta kaalmada adeegyada, waxay idiin hayaan adeeg kharash la'crow . So [facility] [phone].    ATENCIÓN: Si habla español, tiene a bonner disposición servicios gratuitos de asistencia lingüística. [facility] [phone].    We comply with applicable federal civil rights laws and [place] laws. We do not discriminate on the basis of race, color, national origin, age, disability sex, sexual orientation or gender identity.            Thank you!     Thank you for choosing [facility]  for your care. Our goal is always to provide you with excellent care. Hearing back from our patients is one way we can continue to improve our services. Please take a few minutes to complete the written survey that you may receive in the mail after your visit with us. Thank you!             Your Updated Medication List - Protect others around you: Learn how to safely use, store and throw away your medicines at www.disposemymeds.org.          This list is accurate as of: 8/3/17 12:01 PM.  Always use your most recent med list.                   Brand Name Dispense Instructions for use Diagnosis    * acetaminophen 325 MG tablet    TYLENOL    100 tablet    Take 2 tablets (650 mg) by mouth every 4 hours as needed for mild pain    Chronic low back pain without sciatica, unspecified back pain laterality       *  acetaminophen 325 MG tablet    TYLENOL    2 tablet    Take 2 tablets (650 mg) by mouth once for 1 dose    Acute midline low back pain, with sciatica presence unspecified       ENSURE NUTRITION SHAKE Liqd     60 Bottle    Take 1 Bottle by mouth 2 times daily    Hypoglycemia, unspecified       omeprazole 20 MG tablet     180 tablet    Take 1 tablet (20 mg) by mouth daily as needed Take 30-60 minutes before a meal.    Gastroesophageal reflux disease without esophagitis       * order for DME     1 Device    Equipment being ordered: walker    Gait disturbance       * order for DME     1 Units    Equipment being ordered: Crutches    Preventative health care       * order for DME     1 Device    Equipment being ordered: Wheelchair    Pain of left lower leg       * order for DME     1 Device    Equipment being ordered: Wheelchair    Primary osteoarthritis involving multiple joints       * Notice:  This list has 6 medication(s) that are the same as other medications prescribed for you. Read the directions carefully, and ask your doctor or other care provider to review them with you.

## 2017-08-03 NOTE — PROGRESS NOTES
"       HPI:       Vignesh Fallon is a 82 year old  female { :190969} who presents {FOLLOW UP OR NEW CONCERN:689725}    Back Pain: For the last week. Month ago, but worsened in the last week. \"Feel like my back is broken\" Severe, sharp pain -- dull then became sharp a week ago. hurts at top of hip on back side. Dull when not moving, sharp with movement. Wasn't doing anything when got worse, no falls no known injuries. Hasn't had pain this bad in the past. Tried local anesthetic last night which allowed her to sleep. Tylenol helped temporarily - 325mg. No shooting pain, no numbness.   Hx of osteoarthritis.   Eating and drinking well  Has had kidney stones in the past, no dysuria/hematuria      T2DM:   - Medications: No medications, diet controlled  - Glucose checks: checks in the mornings, usually between , does experience lows about 1-2x/month  - Secondary symtpoms    {:067509}         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 180 tablet 1     acetaminophen (TYLENOL) " "325 MG tablet Take 2 tablets (650 mg) by mouth every 4 hours as needed for mild pain 100 tablet 0     order for DME Equipment being ordered: Wheelchair 1 Device 0     order for DME Equipment being ordered: Wheelchair 1 Device 0     Nutritional Supplements (ENSURE NUTRITION SHAKE) LIQD Take 1 Bottle by mouth 2 times daily 60 Bottle 3     ORDER FOR DME, SET TO LOCAL PRINT, Equipment being ordered: Crutches 1 Units 0     ORDER FOR DME Equipment being ordered: walker 1 Device 0       {SOCIAL HISTORY:590393567}       Allergies   Allergen Reactions     No Known Allergies        No results found for this or any previous visit (from the past 24 hour(s)).         Review of Systems:     {ROS DM :068862::\"C: NEGATIVE for fatigue, unexpected change in weight\",\"E: NEGATIVE for acute vision problems or changes\",\"R: NEGATIVE for significant cough or shortness of breath\",\"CV: NEGATIVE for chest pain, palpitations or new or worsening peripheral edema\",\"P: NEGATIVE for changes in mood or affect\"}          Physical Exam:     There were no vitals filed for this visit.  There is no height or weight on file to calculate BMI.    {EXAM -OPEN/NORMAL/ABNL:378093::\"GENERAL APPEARANCE: healthy, alert and no distress,\"}    BP recheck 147/68  No spinal tenderness  Tight lumbar muscles, but bilateral  Difficulty walking, getting up from a chair (unable), leaning over  Normal neuro    Assessment and Plan     There are no diagnoses linked to this encounter.    Options for treatment and follow-up care were reviewed with the ***. Vignesh Fallon ***engaged in the decision making process and verbalized understanding of the options discussed and agreed with the final plan.    Benjamin Rosenstein, MD, MA  [facility]  P: [phone]      Precepted today with: {pvpreceptors:410846}  "

## 2017-08-03 NOTE — PROGRESS NOTES
Preceptor Attestation:  Patient's case reviewed and discussed with Benjamin Rosenstein, MD Patient seen and discussed with the resident. and I personally reviewed the imaging and agree with the interpretation documented by the resident.. I agree with assessment and plan of care.  Supervising Physician:  Navi Izaguirre MD  [facility]

## 2017-08-03 NOTE — NURSING NOTE
name: Dariel Clale  Language: [employer]  Agency: [employer]  Phone number: [phone]      The following medication was given:     MEDICATION: Acetaminophen (Pain relief) 325mg  ROUTE: PO  SITE: mouth  DOSE: 650 mg  LOT #: [id number]  :  [employer].  EXPIRATION DATE:  3/1/2018  NDC#: 18860-659-67  Given By: Carl Her, CMA pt tolerated oral medication well  Dr. Redding was in clinic at the time of oral medication.

## 2017-08-11 ENCOUNTER — MEDICAL CORRESPONDENCE (OUTPATIENT)
Dept: HEALTH INFORMATION MANAGEMENT | Facility: CLINIC | Age: 82
End: 2017-08-11

## 2017-08-16 ENCOUNTER — MEDICAL CORRESPONDENCE (OUTPATIENT)
Dept: HEALTH INFORMATION MANAGEMENT | Facility: CLINIC | Age: 82
End: 2017-08-16

## 2017-08-17 ENCOUNTER — OFFICE VISIT (OUTPATIENT)
Dept: FAMILY MEDICINE | Facility: CLINIC | Age: 82
End: 2017-08-17

## 2017-08-17 VITALS
OXYGEN SATURATION: 100 % | HEIGHT: 55 IN | HEART RATE: 68 BPM | RESPIRATION RATE: 21 BRPM | TEMPERATURE: 98.1 F | WEIGHT: 96.5 LBS | SYSTOLIC BLOOD PRESSURE: 187 MMHG | BODY MASS INDEX: 22.33 KG/M2 | DIASTOLIC BLOOD PRESSURE: 77 MMHG

## 2017-08-17 DIAGNOSIS — E11.29 TYPE 2 DIABETES MELLITUS WITH OTHER DIABETIC KIDNEY COMPLICATION, WITHOUT LONG-TERM CURRENT USE OF INSULIN (H): ICD-10-CM

## 2017-08-17 DIAGNOSIS — N18.30 CHRONIC KIDNEY DISEASE, STAGE III (MODERATE) (H): ICD-10-CM

## 2017-08-17 DIAGNOSIS — E55.9 VITAMIN D DEFICIENCY: ICD-10-CM

## 2017-08-17 DIAGNOSIS — S32.010D CLOSED WEDGE COMPRESSION FRACTURE OF FIRST LUMBAR VERTEBRA WITH ROUTINE HEALING, SUBSEQUENT ENCOUNTER: Primary | ICD-10-CM

## 2017-08-17 DIAGNOSIS — G89.29 CHRONIC LOW BACK PAIN WITHOUT SCIATICA, UNSPECIFIED BACK PAIN LATERALITY: ICD-10-CM

## 2017-08-17 DIAGNOSIS — M54.50 CHRONIC LOW BACK PAIN WITHOUT SCIATICA, UNSPECIFIED BACK PAIN LATERALITY: ICD-10-CM

## 2017-08-17 PROBLEM — Z20.1 EXPOSURE TO TB: Status: ACTIVE | Noted: 2017-08-17

## 2017-08-17 LAB
BUN SERPL-MCNC: 38 MG/DL (ref 7–30)
CALCIUM SERPL-MCNC: 9 MG/DL (ref 8.5–10.4)
CHLORIDE SERPLBLD-SCNC: 109 MMOL/L (ref 94–109)
CO2 SERPL-SCNC: 21 MMOL/L (ref 20–32)
CREAT SERPL-MCNC: 2 MG/DL (ref 0.6–1.3)
EGFR CALCULATED (BLACK REFERENCE): 30.7 ML/MIN
EGFR CALCULATED (NON BLACK REFERENCE): 25.4 ML/MIN
GLUCOSE SERPL-MCNC: 113 MG/DL (ref 60–109)
POTASSIUM SERPL-SCNC: 4 MMOL/L (ref 3.4–5.3)
SODIUM SERPL-SCNC: 138 MMOL/L (ref 133–144)

## 2017-08-17 RX ORDER — IBUPROFEN 400 MG/1
400 TABLET, FILM COATED ORAL EVERY 8 HOURS PRN
Qty: 120 TABLET | Refills: 1 | Status: SHIPPED | OUTPATIENT
Start: 2017-08-17 | End: 2017-11-30 | Stop reason: ALTCHOICE

## 2017-08-17 NOTE — MR AVS SNAPSHOT
After Visit Summary   8/17/2017    Vignesh Fallon    MRN: [id number]           Patient Information     Date Of Birth          1935        Visit Information        Provider Department      8/17/2017 8:20 AM Rosenstein, Benjamin, MD [facility]        Today's Diagnoses     Closed wedge compression fracture of first lumbar vertebra with routine healing, subsequent encounter    -  1    Chronic kidney disease, stage III (moderate)        Vitamin D deficiency        Type 2 diabetes mellitus with other diabetic kidney complication, without long-term current use of insulin (H)          Care Instructions    I'm sorry you're still having pain. I do believe this will get better over time, but it will take some weeks. Take the tylenol 650mg every 4 hours as needed. I am going to check your kidney function today. If this is ok, you can take 600-800mg of ibuprofen every 4 hours as well, alternating with the tylenol (that is. Tylenol 650, wait 4 hours, ibuprofen, wait 4 hours, tylenol, and so on)    We will see you again in two weeks to see how you're doing. Call us earlier if you're still having significant problems.           Follow-ups after your visit        Who to contact     Please call your clinic at [phone] to:    Ask questions about your health    Make or cancel appointments    Discuss your medicines    Learn about your test results    Speak to your doctor   If you have compliments or concerns about an experience at your clinic, or if you wish to file a complaint, please contact [facility] Patient Relations at [phone] or email us at [email or web address]         Additional Information About Your Visit        MyChart Information     [employer] is an electronic gateway that provides easy, online access to your medical records. With [employer], you can request a clinic appointment, read your test results, renew a prescription or communicate with your care  "team.     To sign up for [employer] visit the website at [email or web address]   You will be asked to enter the access code listed below, as well as some personal information. Please follow the directions to create your username and password.     Your access code is: [email or web address]  Expires: 2017  4:58 PM     Your access code will  in 90 days. If you need help or a new code, please contact your [facility] or call [phone] for assistance.        Care EveryWhere ID     This is your Care EveryWhere ID. This could be used by other organizations to access your [place] medical records  [id number]        Your Vitals Were     Pulse Temperature Respirations Height Pulse Oximetry BMI (Body Mass Index)    68 98.1  F (36.7  C) (Oral) 21 4' 4\" (132.1 cm) 100% 25.09 kg/m2       Blood Pressure from Last 3 Encounters:   17 187/77   17 180/68   17 158/79    Weight from Last 3 Encounters:   17 96 lb 8 oz (43.8 kg)   17 100 lb 12.8 oz (45.7 kg)   17 102 lb (46.3 kg)              We Performed the Following     Basic Metabolic Panel ([facility]) - Results < 1 hr     Glycosylated Hgb A1C ([facility])     [facility]        Primary Care Provider Office Phone # Fax #    Hope Tinsley [phone]       [facility] [address] [place] [address]        Equal Access to Services     [facility]: Hadii aad ku hadasho Soomaali, waaxda luqadaha, qaybta kaalmada adeegyada, ben garcia . So [facility] [phone].    ATENCIÓN: Si habla español, tiene a bonner disposición servicios gratuitos de asistencia lingüística. Llame al [phone].    We comply with applicable federal civil rights laws and [place] laws. We do not discriminate on the basis of race, color, national origin, age, disability sex, sexual orientation or gender identity.            Thank you!     Thank you for " choosing [facility]  for your care. Our goal is always to provide you with excellent care. Hearing back from our patients is one way we can continue to improve our services. Please take a few minutes to complete the written survey that you may receive in the mail after your visit with us. Thank you!             Your Updated Medication List - Protect others around you: Learn how to safely use, store and throw away your medicines at www.disposemymeds.org.          This list is accurate as of: 8/17/17  9:42 AM.  Always use your most recent med list.                   Brand Name Dispense Instructions for use Diagnosis    * acetaminophen 325 MG tablet    TYLENOL    100 tablet    Take 2 tablets (650 mg) by mouth every 4 hours as needed for mild pain    Chronic low back pain without sciatica, unspecified back pain laterality       * acetaminophen 325 MG tablet    TYLENOL    100 tablet    Take 1 tablet (325 mg) by mouth every 6 hours as needed for mild pain (severe pain)    Pathologic compression fracture of spine, initial encounter (H)       ENSURE NUTRITION SHAKE Liqd     60 Bottle    Take 1 Bottle by mouth 2 times daily    Hypoglycemia, unspecified       omeprazole 20 MG tablet     180 tablet    Take 1 tablet (20 mg) by mouth daily as needed Take 30-60 minutes before a meal.    Gastroesophageal reflux disease without esophagitis       * order for DME     1 Device    Equipment being ordered: walker    Gait disturbance       * order for DME     1 Units    Equipment being ordered: Crutches    Preventative health care       * order for DME     1 Device    Equipment being ordered: Wheelchair    Pain of left lower leg       * order for DME     1 Device    Equipment being ordered: Wheelchair    Primary osteoarthritis involving multiple joints       * Notice:  This list has 6 medication(s) that are the same as other medications prescribed for you. Read the directions carefully, and ask your doctor or other care  provider to review them with you.

## 2017-08-17 NOTE — PATIENT INSTRUCTIONS
I'm sorry you're still having pain. I do believe this will get better over time, but it will take some weeks. Take the tylenol 650mg every 4 hours as needed. I am going to check your kidney function today. If this is ok, you can take 600-800mg of ibuprofen every 4 hours as well, alternating with the tylenol (that is. Tylenol 650, wait 4 hours, ibuprofen, wait 4 hours, tylenol, and so on)    We will see you again in two weeks to see how you're doing. Call us earlier if you're still having significant problems.

## 2017-08-17 NOTE — LETTER
August 21, 2017      Vignesh Fallon  [address] [place] [address]        Maye Medellin     Your kidney function is stable. I talked to your son about this already and you should be ok to take the ibuprofen as we talked about when you were here.     Your diabetes lab (Hemogobin A1c) is just fine where it is. We do not need to start any medications for diabetes again.     Your Vitamin D level is pretty low. This is not immediately a problem but we should discuss some supplements you could take at your next visit.     Please call with any other concerns.    Please see below for your test results.    Resulted Orders   Basic Metabolic Panel ([facility]) - Results < 1 hr   Result Value Ref Range    Glucose 113.0 (H) 60.0 - 109.0 mg/dL    Urea Nitrogen 38.0 (H) 7.0 - 30.0 mg/dL    Creatinine 2.0 (H) 0.6 - 1.3 mg/dL    Sodium 138.0 133.0 - 144.0 mmol/L    Potassium 4.0 3.4 - 5.3 mmol/L    Chloride 109.0 94.0 - 109.0 mmol/L    Carbon Dioxide 21.0 20.0 - 32.0 mmol/L    Calcium 9.0 8.5 - 10.4 mg/dL    eGFR Calculated (Non Black Reference) 25.4 (L) >60.0 mL/min    eGFR Calculated (Black Reference) 30.7 (L) >60.0 mL/min   Glycosylated Hgb A1C ([facility])   Result Value Ref Range    Hemoglobin A1C 6.8 (H) 4.2 - 6.1 %    Narrative    Test performed by:  [facility]  [address] [place] [address]   Vitamin D 25-Hydroxy ([facility])   Result Value Ref Range    Vitamin D,25-Hydroxy 17.9 (L) 30.0 - 80.0 ng/mL    Narrative    Test performed by:  [facility]  [address] [place] [address]  Deficiency <10.0 ng/mL  Insufficiency 10.0-29.9 ng/mL  Sufficiency 30.0-80.0 ng/mL  Toxicity (possible) >100.0 ng/mL       If you have any questions, please call the clinic to make an appointment.    Sincerely,    Benjamin Rosenstein, MD

## 2017-08-17 NOTE — PROGRESS NOTES
HPI:       Vignesh Fallon is a 82 year old  Female w/complex medical hx including CKD, T2DM, osteoporosis who presents for follow up of concern(s) listed below:    Back Pain: Still having significant pain in back. Again describes shooting and sharp pains associated with movement. The scheduled 325mg tylenol was working before, just after seeing me previously. She would take two tabs if the pain was worse, taking one and if that didn't help taking a second about 30min later. She has had to increase the dose such that she has taken 650mg TID now multiple times. She gets around at home with a walker and wheel chair. Family helps her with stairs. She feels she would be unable to participate in PT with the pain and would have difficulty making appointments. She has not had any new fevers, urinary or bowel symptoms, or worsening weakness of her legs.     Mood: She reveals she feels quite sad and depressed, particularly after hurting her back. She states she is worried about her health and feels she will not get better. She would like feel better and not be in pain. She has these feelings daily. She still enjoys her usual activities and has tried to work in the garden the last two weeks, though it's been difficulty. She notes a decreased appetite, but eats regularly. She has no thoughts of self harm.     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)     Esophageal reflux     Goiter     Displacement of lumbar intervertebral disc without myelopathy     Herpes zoster     Essential hypertension      "Hypoglycemia     Stiffness of joint, not elsewhere classified,  shoulder region     Calculus of kidney     Osteoarthritis     Osteoarthrosis involving lower leg     Plantar fascial fibromatosis     Diabetes mellitus, type 2 (H)     Antalgic gait       Current Outpatient Prescriptions   Medication Sig Dispense Refill     acetaminophen (TYLENOL) 325 MG tablet Take 2 tablets (650 mg) by mouth every 4 hours as needed for mild pain 100 tablet 0     acetaminophen (TYLENOL) 325 MG tablet Take 1 tablet (325 mg) by mouth every 6 hours as needed for mild pain (severe pain) 100 tablet 0     omeprazole 20 MG tablet Take 1 tablet (20 mg) by mouth daily as needed Take 30-60 minutes before a meal. 180 tablet 1     order for DME Equipment being ordered: Wheelchair 1 Device 0     order for DME Equipment being ordered: Wheelchair 1 Device 0     Nutritional Supplements (ENSURE NUTRITION SHAKE) LIQD Take 1 Bottle by mouth 2 times daily 60 Bottle 3     ORDER FOR DME, SET TO LOCAL PRINT, Equipment being ordered: Crutches 1 Units 0     ORDER FOR DME Equipment being ordered: walker 1 Device 0       Lives at home with children and grandchildren. Requires help in the home for many activities.        Allergies   Allergen Reactions     No Known Allergies        No results found for this or any previous visit (from the past 24 hour(s)).         Review of Systems:    ROS: 10 point ROS neg other than the symptoms noted above in the HPI.            Physical Exam:     Vitals:    08/17/17 0836   BP: 187/77   Pulse: 68   Resp: 21   Temp: 98.1  F (36.7  C)   TempSrc: Oral   SpO2: 100%   Weight: 96 lb 8 oz (43.8 kg)   Height: 4' 4\" (132.1 cm)     Body mass index is 25.09 kg/(m^2).    GENERAL APPEARANCE: Older woman appearing stated age seated in her wheelchair, leaning over and resting on her knees, in obvious discomfort.   NECK: Asymmetry with chronic swelling of right side, no pain or rigidity  RESP: Normal WOB, lungs clear to auscultation - no " rales, rhonchi or wheezes  CV: regular rates and rhythm, normal S1 S2, no S3 or S4 and no murmur, click or rub  ABDOMEN: soft, nontender, without hepatosplenomegaly or masses and bowel sounds normal  MS: Back with kyphosis and moderate tenderness to palpation along lumbar area, mild  SKIN: no suspicious lesions or rashes  NEURO: Alert, interactive, answers questions appropriately; normal plantar flexion and dorsiflexion, limited ability to straighten legs while seated due to pain. No loss of sensation in proximal or distal extremities.  PSYCH: Decreased affect with associated mood      Assessment and Plan     Leeanne Medellin is an 83 yo female with complex medical history who was found to have a likely acute vertebral compression fracture returning to clinic for follow-up of acute back pain and fracture    Closed wedge compression fracture of first lumbar vertebra with routine healing, subsequent encounter: Reviewed XR findings with Leeanne. Continues to have severe pain but is relieved by Tylenol. Will change to scheduled 650mg tylenol and alternate with ibuprofen after checking renal function (Cr was found to be stable). Will try to avoid opioids due to fall/confusion risk and side-effects  -     ibuprofen (ADVIL/MOTRIN) 400 MG tablet; Take 1 tablet (400 mg) by mouth every 8 hours as needed for moderate pain    Chronic kidney disease, stage III (moderate): Cr of 2.0, stable since 2015. Will give low dose ibuprofen to alternate with tylenol. She is not on an ACE-I.  -     Basic Metabolic Panel ([facility]) - Results < 1 hr    Vitamin D deficiency: Hx of osteoporosis per chart, unprovoked vertebral compression fx supports diagnosis in absence of DEXA. Will check Vitamin D level today, may add supplement with follow up.       -     Vitamin D 25-Hydroxy ([facility])    Type 2 diabetes mellitus with other diabetic kidney complication, without long-term current use of insulin (H): No medication, last A1c was 6.5 in 2015. Recheck  today as well as glucose.   -     Basic Metabolic Panel ([facility]) - Results < 1 hr  -     Glycosylated Hgb A1C ([facility])    Options for treatment and follow-up care were reviewed with the patient. Vignesh Fallon engaged in the decision making process and verbalized understanding of the options discussed and agreed with the final plan.    Benjamin Rosenstein, MD, MA  [facility]  P: [phone]      Precepted today with: Marielle De La Paz MD

## 2017-08-18 LAB
25(OH)D3 SERPL-MCNC: 17.9 NG/ML (ref 30–80)
HBA1C MFR BLD: 6.8 % (ref 4.2–6.1)

## 2017-08-18 RX ORDER — ACETAMINOPHEN 325 MG/1
650 TABLET ORAL EVERY 4 HOURS PRN
Qty: 100 TABLET | Refills: 11 | Status: SHIPPED | OUTPATIENT
Start: 2017-08-18 | End: 2018-03-23

## 2017-08-28 NOTE — PROGRESS NOTES
Preceptor Attestation:  Patient's case reviewed and discussed with Benjamin Rosenstein, MD.  Patient seen and discussed with the resident.  I agree with assessment and plan of care.  Supervising Physician:  Marielle De La Paz MD  [facility]

## 2017-11-17 DIAGNOSIS — S32.010D CLOSED WEDGE COMPRESSION FRACTURE OF FIRST LUMBAR VERTEBRA WITH ROUTINE HEALING, SUBSEQUENT ENCOUNTER: ICD-10-CM

## 2017-11-21 RX ORDER — IBUPROFEN 400 MG/1
400 TABLET, FILM COATED ORAL EVERY 8 HOURS PRN
Qty: 120 TABLET | OUTPATIENT
Start: 2017-11-21

## 2017-11-22 NOTE — TELEPHONE ENCOUNTER
Have patient come see Dr. Tinsley when back. She should not be on ibuprofen because of her kidney function.

## 2017-11-27 NOTE — TELEPHONE ENCOUNTER
Called and informed patient with the denial of Ibuprofen and patient agree to return to clinic and will forward the message to the  to schedule appointment.

## 2017-11-30 ENCOUNTER — OFFICE VISIT (OUTPATIENT)
Dept: FAMILY MEDICINE | Facility: CLINIC | Age: 82
End: 2017-11-30

## 2017-11-30 VITALS
BODY MASS INDEX: 22.77 KG/M2 | RESPIRATION RATE: 19 BRPM | WEIGHT: 98.38 LBS | SYSTOLIC BLOOD PRESSURE: 147 MMHG | HEIGHT: 55 IN | TEMPERATURE: 97.9 F | DIASTOLIC BLOOD PRESSURE: 70 MMHG | HEART RATE: 66 BPM | OXYGEN SATURATION: 100 %

## 2017-11-30 DIAGNOSIS — M17.11 PRIMARY LOCALIZED OSTEOARTHROSIS OF RIGHT LOWER LEG: Primary | ICD-10-CM

## 2017-11-30 DIAGNOSIS — K21.9 GASTROESOPHAGEAL REFLUX DISEASE WITHOUT ESOPHAGITIS: ICD-10-CM

## 2017-11-30 DIAGNOSIS — E11.49 DIABETES MELLITUS TYPE 2 WITH NEUROLOGICAL MANIFESTATIONS (H): ICD-10-CM

## 2017-11-30 DIAGNOSIS — N18.30 CHRONIC KIDNEY DISEASE, STAGE III (MODERATE) (H): ICD-10-CM

## 2017-11-30 DIAGNOSIS — I10 ESSENTIAL HYPERTENSION: ICD-10-CM

## 2017-11-30 LAB
BUN SERPL-MCNC: 44 MG/DL (ref 7–30)
CALCIUM SERPL-MCNC: 9.1 MG/DL (ref 8.5–10.4)
CHLORIDE SERPLBLD-SCNC: 105 MMOL/L (ref 94–109)
CO2 SERPL-SCNC: 24 MMOL/L (ref 20–32)
CREAT SERPL-MCNC: 2.2 MG/DL (ref 0.6–1.3)
EGFR CALCULATED (BLACK REFERENCE): 27.5 ML/MIN
EGFR CALCULATED (NON BLACK REFERENCE): 22.7 ML/MIN
GLUCOSE SERPL-MCNC: 97 MG/DL (ref 60–109)
POTASSIUM SERPL-SCNC: 4.1 MMOL/L (ref 3.4–5.3)
SODIUM SERPL-SCNC: 139 MMOL/L (ref 133–144)

## 2017-11-30 RX ORDER — TRIAMCINOLONE ACETONIDE 40 MG/ML
40 INJECTION, SUSPENSION INTRA-ARTICULAR; INTRAMUSCULAR ONCE
Qty: 1 ML | Refills: 0 | OUTPATIENT
Start: 2017-11-30 | End: 2017-11-30

## 2017-11-30 RX ORDER — NICOTINE POLACRILEX 4 MG/1
20 GUM, CHEWING ORAL DAILY PRN
Qty: 180 TABLET | Refills: 1 | Status: SHIPPED | OUTPATIENT
Start: 2017-11-30 | End: 2018-05-04 | Stop reason: ALTCHOICE

## 2017-11-30 ASSESSMENT — PATIENT HEALTH QUESTIONNAIRE - PHQ9: SUM OF ALL RESPONSES TO PHQ QUESTIONS 1-9: 9

## 2017-11-30 NOTE — MR AVS SNAPSHOT
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  Please schedule a nurse visit for BP recheck in two weeks. If any of your labs are abnormal we will call or send a letter. The knee should feel better soon!    **If you had lab testing today and your results are reassuring or normal they will be be mailed to you within 7 days.   **If the lab tests need quick action we will call you with the results.  The phone number we will call with results is # [phone] (home) . If this is not the best number please call our clinic and change the number.  If you need any refills please call your pharmacy and they will contact us.  If you have any concerns about today's visit or wish to schedule another appointment please call our office during normal business hours [phone] (8-5:00 M-F)  If you have urgent medical concerns please call [phone] at any time of the day.  If you a medical emergency please call [phone]  Again thank you for choosing [facility] and please let us know how we can best partner with you to improve you and your family's health.              Follow-ups after your visit        Follow-up notes from your care team     Return in about 2 weeks (around 12/14/2017) for BP Recheck.      Who to contact     Please call your clinic at [phone] to:    Ask questions about your health    Make or cancel  "appointments    Discuss your medicines    Learn about your test results    Speak to your doctor   If you have compliments or concerns about an experience at your clinic, or if you wish to file a complaint, please contact [facility] Patient Relations at [phone] or email us at [email or web address]         Additional Information About Your Visit        [employer] Information     [employer] is an electronic gateway that provides easy, online access to your medical records. With [employer], you can request a clinic appointment, read your test results, renew a prescription or communicate with your care team.     To sign up for [employer] visit the website at [email or web address]   You will be asked to enter the access code listed below, as well as some personal information. Please follow the directions to create your username and password.     Your access code is: [email or web address]  Expires: 2018  2:00 PM     Your access code will  in 90 days. If you need help or a new code, please contact your [facility] or call [phone] for assistance.        Care EveryWhere ID     This is your Care EveryWhere ID. This could be used by other organizations to access your [facility] medical records  [id number]        Your Vitals Were     Pulse Temperature Respirations Height Pulse Oximetry BMI (Body Mass Index)    66 97.9  F (36.6  C) (Oral) 19 4' 5.5\" (135.9 cm) 100% 24.16 kg/m2       Blood Pressure from Last 3 Encounters:   17 147/70   17 187/77   17 180/68    Weight from Last 3 Encounters:   17 98 lb 6 oz (44.6 kg)   17 96 lb 8 oz (43.8 kg)   17 100 lb 12.8 oz (45.7 kg)              We Performed the Following     Basic Metabolic Panel ([facility]) - Results < 1 hr     DRAIN/INJECT LARGE JOINT/BURSA     TRIAMCINOLONE ACET INJ NOS     XR KNEE BILATERAL 1/2 VW          Today's Medication Changes          These changes are " accurate as of: 11/30/17 11:59 PM.  If you have any questions, ask your nurse or doctor.               Start taking these medicines.        Dose/Directions    triamcinolone acetonide 40 MG/ML injection   Commonly known as:  KENALOG-40   Used for:  Primary localized osteoarthrosis of right lower leg        Dose:  40 mg   1 mL (40 mg) by INTRA-ARTICULAR route once for 1 dose   Quantity:  1 mL   Refills:  0         These medicines have changed or have updated prescriptions.        Dose/Directions    * acetaminophen 325 MG tablet   Commonly known as:  TYLENOL   This may have changed:  Another medication with the same name was added. Make sure you understand how and when to take each.   Used for:  Pathologic compression fracture of spine, initial encounter (H)        Dose:  325 mg   Take 1 tablet (325 mg) by mouth every 6 hours as needed for mild pain (severe pain)   Quantity:  100 tablet   Refills:  0       * acetaminophen 325 MG tablet   Commonly known as:  TYLENOL   This may have changed:  Another medication with the same name was added. Make sure you understand how and when to take each.   Used for:  Chronic low back pain without sciatica, unspecified back pain laterality        Dose:  650 mg   Take 2 tablets (650 mg) by mouth every 4 hours as needed for mild pain   Quantity:  100 tablet   Refills:  11       * acetaminophen 32 mg/mL solution   Commonly known as:  TYLENOL   This may have changed:  You were already taking a medication with the same name, and this prescription was added. Make sure you understand how and when to take each.   Used for:  Primary localized osteoarthrosis of right lower leg        Dose:  650 mg   Take 20.3 mLs (650 mg) by mouth every 4 hours as needed for mild pain or pain   Quantity:  473 mL   Refills:  3       * Notice:  This list has 3 medication(s) that are the same as other medications prescribed for you. Read the directions carefully, and ask your doctor or other care provider to  review them with you.      Stop taking these medicines if you haven't already. Please contact your care team if you have questions.     ibuprofen 400 MG tablet   Commonly known as:  ADVIL/MOTRIN                Where to get your medicines      These medications were sent to [facility]/pharmacy #[id number] - [place], [place] - [address]  [address], [place] [address]     Phone:  [phone]     acetaminophen 32 mg/mL solution    omeprazole 20 MG tablet         Some of these will need a paper prescription and others can be bought over the counter.  Ask your nurse if you have questions.     You don't need a prescription for these medications     triamcinolone acetonide 40 MG/ML injection                Primary Care Provider Office Phone # Fax #    Hope Tinsley [phone]       [facility] [address] [place] [address]        Equal Access to Services     ALEX [facility] : Hadii anupam baileyo Somery, waaxda luqadaha, qaybta kaalmada ademaxxyada, ben garcia . So [facility] [phone].    ATENCIÓN: Si habla español, tiene a bonner disposición servicios gratuitos de asistencia lingüística. Llame al [phone].    We comply with applicable federal civil rights laws and [place] laws. We do not discriminate on the basis of race, color, national origin, age, disability, sex, sexual orientation, or gender identity.            Thank you!     Thank you for choosing [facility]  for your care. Our goal is always to provide you with excellent care. Hearing back from our patients is one way we can continue to improve our services. Please take a few minutes to complete the written survey that you may receive in the mail after your visit with us. Thank you!             Your Updated Medication List - Protect others around you: Learn how to safely use, store and throw away your medicines at www.disposemymeds.org.          This list is accurate as of:  11/30/17 11:59 PM.  Always use your most recent med list.                   Brand Name Dispense Instructions for use Diagnosis    * acetaminophen 325 MG tablet    TYLENOL    100 tablet    Take 1 tablet (325 mg) by mouth every 6 hours as needed for mild pain (severe pain)    Pathologic compression fracture of spine, initial encounter (H)       * acetaminophen 325 MG tablet    TYLENOL    100 tablet    Take 2 tablets (650 mg) by mouth every 4 hours as needed for mild pain    Chronic low back pain without sciatica, unspecified back pain laterality       * acetaminophen 32 mg/mL solution    TYLENOL    473 mL    Take 20.3 mLs (650 mg) by mouth every 4 hours as needed for mild pain or pain    Primary localized osteoarthrosis of right lower leg       ENSURE NUTRITION SHAKE Liqd     60 Bottle    Take 1 Bottle by mouth 2 times daily    Hypoglycemia, unspecified       omeprazole 20 MG tablet     180 tablet    Take 1 tablet (20 mg) by mouth daily as needed Take 30-60 minutes before a meal.    Gastroesophageal reflux disease without esophagitis       triamcinolone acetonide 40 MG/ML injection    KENALOG-40    1 mL    1 mL (40 mg) by INTRA-ARTICULAR route once for 1 dose    Primary localized osteoarthrosis of right lower leg       * Notice:  This list has 3 medication(s) that are the same as other medications prescribed for you. Read the directions carefully, and ask your doctor or other care provider to review them with you.

## 2017-11-30 NOTE — PROGRESS NOTES
HPI:       Vignesh Fallon is a 82 year old female who presents for a medication refill and knee pain.     Knee pain: has had chronic pain for years. Right side is worse then left. Limits her activity. Pain more localized medially, but occasionally laterally. Does not radiate anywhere. Is sharp when she walks. Not any better/worse time of day. Has been taking ibuprofen for knee pain with limited results. Has had an injection about 6 months ago and had good relief for about 3 months.     Med refill: Patient only takes ibuprofen and omeprazole. Called in to get refill on ibuprofen but denied as she has CKD stage III. Does not like taking tylenol because the pills are too large.          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)     Esophageal reflux     Goiter     Displacement of lumbar intervertebral disc without myelopathy     Herpes zoster     Essential hypertension     Hypoglycemia     Stiffness of joint, not elsewhere classified,  shoulder region     Calculus of kidney     Osteoarthritis     Osteoarthrosis involving lower leg     Plantar fascial fibromatosis     Diabetes mellitus, type 2 (H)     Antalgic gait     Exposure to TB       Current Outpatient Prescriptions   Medication Sig Dispense Refill     triamcinolone acetonide (KENALOG-40) 40 MG/ML injection 1 mL (40 mg) by INTRA-ARTICULAR route once for 1 dose 1 mL 0     acetaminophen (TYLENOL) 32 mg/mL solution Take 20.3 mLs (650 mg) by mouth every 4 hours as needed for mild pain or pain 473 mL 3     omeprazole 20 MG tablet Take 1 tablet (20 mg) by mouth daily as needed Take 30-60  minutes before a meal. 180 tablet 1     acetaminophen (TYLENOL) 325 MG tablet Take 2 tablets (650 mg) by mouth every 4 hours as needed for mild pain 100 tablet 11     acetaminophen (TYLENOL) 325 MG tablet Take 1 tablet (325 mg) by mouth every 6 hours as needed for mild pain (severe pain) 100 tablet 0     Nutritional Supplements (ENSURE NUTRITION SHAKE) LIQD Take 1 Bottle by mouth 2 times daily 60 Bottle 3       Social History     Social History     Marital status:      Spouse name: N/A     Number of children: N/A     Years of education: N/A     Occupational History     Not on file.     Social History Main Topics     Smoking status: Never Smoker     Smokeless tobacco: Not on file     Alcohol use No     Drug use: No     Sexual activity: Not on file     Other Topics Concern     Not on file     Social History Narrative          Allergies   Allergen Reactions     No Known Allergies        Results for orders placed or performed in visit on 11/30/17 (from the past 24 hour(s))   Basic Metabolic Panel ([facility]) - Results < 1 hr   Result Value Ref Range    Glucose 97.0 60.0 - 109.0 mg/dL    Urea Nitrogen 44.0 (H) 7.0 - 30.0 mg/dL    Creatinine 2.2 (H) 0.6 - 1.3 mg/dL    Sodium 139.0 133.0 - 144.0 mmol/L    Potassium 4.1 3.4 - 5.3 mmol/L    Chloride 105.0 94.0 - 109.0 mmol/L    Carbon Dioxide 24.0 20.0 - 32.0 mmol/L    Calcium 9.1 8.5 - 10.4 mg/dL    eGFR Calculated (Non Black Reference) 22.7 (L) >60.0 mL/min    eGFR Calculated (Black Reference) 27.5 (L) >60.0 mL/min            Review of Systems:   C: NEGATIVE for fatigue, unexpected change in weight  E: NEGATIVE for acute vision problems or changes  R: NEGATIVE for significant cough or shortness of breath  CV: NEGATIVE for chest pain, palpitations or new or worsening peripheral edema  P: NEGATIVE for changes in mood or affect          Physical Exam:     Vitals:    11/30/17 0857 11/30/17 0858   BP: 163/64 147/70   Pulse: 66    Resp: 19    Temp: 97.9  F (36.6  C)   "  TempSrc: Oral    SpO2: 100%    Weight: 98 lb 6 oz (44.6 kg)    Height: 4' 5.5\" (135.9 cm)      Body mass index is 24.16 kg/(m^2).    GENERAL APPEARANCE: alert and no acute distress  PSYCH: mentation appears normal and affect normal/bright  RESP: lungs clear to auscultation - no rales, rhonchi or wheezes  CV: regular rate and rhythm, normal S1 S2, no S3 or S4 and no murmur, click or rub   EXT: no cyanosis or edema in lower extremities, right knee medial tenderness > lateral, no erythema or swelling.  SKIN: no venous stasis changes    Knee Injection Note    Vignesh Fallon is a patient of [facility]    Consent: Affirmation of informed consent was signed and scanned into the medical record. Risks, benefits and alternatives were discussed. Patient's questions were elicited and answered.   Procedure safety checklist was completed:  Yes  Time Out (Pause for the Cause) completed: Yes    The right knee was prepped  in the usual fashion.    INJECTION:    Using 3 cc of 1% lidocaine mixed with 1mL 40 mg of Kenalog, the knee joint was successfully injected  without complication.  Patient experienced 50% relief of pain following injection.  Bandaid applied.  Routine care discussed.    Was entire vial of medication used? Yes    Follow up: Patient was instructed to call if severe pain, redness, warmth or other concerns      Aditya Mancuso MD    Preceptor:  Dr. Goldy MD present for and supervised this procedure.      Assessment and Plan       #Right knee pain: Most likely osteoarthritis. Knee does not appear red, swollen, or infected. Has been a chronic issue.   -Xray bilateral weight bearing  -Successfully performed right knee injection today    #Med refill: Patient called to get ibuprofen refilled and this was denied. Advised to stop taking ibuprofen d/t kidney disease and start taking tylenol for pains. Unable to tolerate large pills so will try tylenol liquid.   -Tylenol liquid 650mg PO q4h PRN    #HTN: BP today " 163/64 and 147/70 on recheck. Will have recheck with nurse only visit in 2 weeks. Given kidney disease would consider adding ACE for kidney disease and BP.     #Mood: Patient previously had issues with mood. Knee pain contributes significantly to this. Does not want to start any medications for mood today. Scored 9 on PHQ-9.     Options for treatment and follow-up care were reviewed with the patient and/or guardian. Vignesh Leeanne and/or guardian engaged in the decision making process and verbalized understanding of the options discussed and agreed with the final plan.    Aditya Mancuso MD      Precepted today with: Dr. Winslow

## 2017-11-30 NOTE — PATIENT INSTRUCTIONS
Thank you for coming to [facility].  Please schedule a nurse visit for BP recheck in two weeks. If any of your labs are abnormal we will call or send a letter. The knee should feel better soon!    **If you had lab testing today and your results are reassuring or normal they will be be mailed to you within 7 days.   **If the lab tests need quick action we will call you with the results.  The phone number we will call with results is # [phone] (home) . If this is not the best number please call our clinic and change the number.  If you need any refills please call your pharmacy and they will contact us.  If you have any concerns about today's visit or wish to schedule another appointment please call our office during normal business hours [phone] (8-5:00 M-F)  If you have urgent medical concerns please call [phone] at any time of the day.  If you a medical emergency please call [phone]  Again thank you for choosing [facility] and please let us know how we can best partner with you to improve you and your family's health.

## 2017-11-30 NOTE — NURSING NOTE
Eye exam -Decline due very hard to walk    Flu shot -Patient -got the flu shot at the [facility]

## 2017-11-30 NOTE — PROGRESS NOTES
Preceptor Attestation:  Patient's case reviewed and discussed with Aditya Mancuso MD Patient seen and discussed with the resident. and Patient seen and discussed with the resident. and I was present for and supervised the entire procedure. I agree with assessment and plan of care.  Supervising Physician:  Juliana Winslow MD  [facility]

## 2017-11-30 NOTE — PROGRESS NOTES
Please call the patient (needs ) and let her know that her kidney function getting a little worse (based on elevated creatinine and lower eGFR). She should avoid the ibuprofen and other medicine that is bad for her kidney. The tylenol prescribed today will still be fine for her. If her blood pressure is elevated at her next visit it is very important we start medicine to control that because it can be bad for the kidney as well.

## 2018-01-03 ENCOUNTER — TRANSFERRED RECORDS (OUTPATIENT)
Dept: HEALTH INFORMATION MANAGEMENT | Facility: CLINIC | Age: 83
End: 2018-01-03

## 2018-03-23 ENCOUNTER — OFFICE VISIT (OUTPATIENT)
Dept: FAMILY MEDICINE | Facility: CLINIC | Age: 83
End: 2018-03-23
Payer: COMMERCIAL

## 2018-03-23 VITALS
WEIGHT: 97.6 LBS | SYSTOLIC BLOOD PRESSURE: 166 MMHG | DIASTOLIC BLOOD PRESSURE: 78 MMHG | HEIGHT: 55 IN | TEMPERATURE: 98 F | HEART RATE: 66 BPM | OXYGEN SATURATION: 99 % | BODY MASS INDEX: 22.59 KG/M2 | RESPIRATION RATE: 16 BRPM

## 2018-03-23 DIAGNOSIS — M17.11 PRIMARY LOCALIZED OSTEOARTHROSIS OF RIGHT LOWER LEG: ICD-10-CM

## 2018-03-23 DIAGNOSIS — Z78.9 TAKES DIETARY SUPPLEMENTS: ICD-10-CM

## 2018-03-23 DIAGNOSIS — E11.29 TYPE 2 DIABETES MELLITUS WITH OTHER DIABETIC KIDNEY COMPLICATION, WITHOUT LONG-TERM CURRENT USE OF INSULIN (H): Primary | ICD-10-CM

## 2018-03-23 DIAGNOSIS — R22.1 MASS OF NECK: ICD-10-CM

## 2018-03-23 DIAGNOSIS — I10 ESSENTIAL HYPERTENSION: ICD-10-CM

## 2018-03-23 RX ORDER — LACTOSE-REDUCED FOOD
1 LIQUID (ML) ORAL 2 TIMES DAILY
Qty: 60 BOTTLE | Refills: 3 | Status: SHIPPED | OUTPATIENT
Start: 2018-03-23 | End: 2018-10-15

## 2018-03-23 NOTE — PATIENT INSTRUCTIONS
Knee pain: either from osteoarthritis or ruptured baker's cyst  -start tylenol 1000 mg three times per day for 14 days  -apply heat to the area  -come back to clinic in 2-3 weeks if not improving

## 2018-03-23 NOTE — PROGRESS NOTES
HPI:       Vignesh Fallon is a 82 year old female with a hx of CKD3, anemia, depression, DM2, HTN and OA knees who presents for the following issue:    Right leg pain:  -very painful right knee, behind the knee, hard to sleep  -wondering about an X ray to see whats going on  -started as a dull pain 5 years ago and has increased over past 3 days to more severe pain  -pain radiates to shin but mainly focused behind knee  -no numbness/tingling  -no swelling or bumps/masses  -no recent falls or injuries  -no known trigger for pain - occurred spontaneously  -bought an herb to help (boiled and drank as a tea, yellow powder) but did not help her; otherwise not using any medications or creams  -typically stands and walks but hasn't been able to the past few days    Ensure: pt requesting refill today    A [employer]  on [employer] was used for this visit         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)     Esophageal reflux     Goiter     Displacement of lumbar intervertebral disc without myelopathy     Herpes zoster     Essential hypertension     Stiffness of joint, not elsewhere classified,  shoulder region     Calculus of kidney     Osteoarthrosis involving lower leg     Plantar fascial fibromatosis     Antalgic gait     Exposure to TB     Current Outpatient Prescriptions   Medication Sig Dispense Refill     acetaminophen (TYLENOL) 32 mg/mL solution Take 20.3 mLs (650 mg) by mouth every 4 hours as needed for mild pain or pain 473 mL 3     omeprazole 20 MG tablet Take 1 tablet (20 mg) by mouth daily as needed Take 30-60 minutes before a meal.  "180 tablet 1     Nutritional Supplements (ENSURE NUTRITION SHAKE) LIQD Take 1 Bottle by mouth 2 times daily 60 Bottle 3     acetaminophen (TYLENOL) 325 MG tablet Take 2 tablets (650 mg) by mouth every 4 hours as needed for mild pain (Patient not taking: Reported on 3/23/2018) 100 tablet 11     acetaminophen (TYLENOL) 325 MG tablet Take 1 tablet (325 mg) by mouth every 6 hours as needed for mild pain (severe pain) (Patient not taking: Reported on 3/23/2018) 100 tablet 0     Allergies   Allergen Reactions     No Known Allergies           Review of Systems:   ROS negative except as noted above          Physical Exam:     Vitals:    03/23/18 1529   BP: 166/78   Pulse: 66   Resp: 16   Temp: 98  F (36.7  C)   TempSrc: Oral   SpO2: 99%   Weight: 97 lb 9.6 oz (44.3 kg)   Height: 4' 4\" (132.1 cm)     Body mass index is 25.38 kg/(m^2).    General appearance: alert, NAD  HEENT: atraumatic, normocephalic, no scleral icterus or injection, moist mucous membranes  Neck: possible neck mass (right neck) per visualization  CV: RRR, no murmurs/rubs/gallops, normal S1 and S2  Lungs: CTAB, no wheezes or crackles, breathing comfortably on room air  Extremities: no LE edema bilaterally, strong peripheral pulses bilaterally. Right knee: exam done in wheelchair was pt unable to get onto exam table - no redness or warmth, no tenderness with palpation, no masses in posterior knee, able to flex and extend knee but does more slowly than left knee d/t pain. Left knee: normal.  Neuro: alert, CNs grossly intact, no focal deficits appreciated  Psych: normal mood/affect/behavior, answering questions appropriately via      Assessment and Plan     Right knee pain: pt with hx OA of bilateral knees; acute worsening pain over past 3 days without trauma. Overall normal exam today though flexion and extension of right knee is slower than left d/t pain. Etiology possibly worsening of OA vs rupture of baker's cyst. No indication for XR today. " Discussed treating conservatively and following up if not improving. Recommend tylenol 1000mg TID x 14 days and applying heat to area. Recommend returning to clinic if not improving in 2-3 weeks; could consider XR at that time.    Neck mass: pt appears to have right sided neck mass, did not do further exam today as ran out of time. Hx thyroid disease with report of goiter on problem list, TSH 0.18 in 2015. Recommend further exam at next visit and TSH recheck.    HTN: blood pressure elevated today to 166/78. Pt not currently taking antihypertensive medication. If elevated at next visit would consider starting ACEi given hx CKD.    Hx DM2: pt due for labs, had ordered A1c and lipid panel but pt did not get blood drawn today. Will do at next visit.      Options for treatment and follow-up care were reviewed with the patient and/or guardian. Vignesh Balbuenaua and/or guardian engaged in the decision making process and verbalized understanding of the options discussed and agreed with the final plan.    Cynthia Tubbs MD    Precepted today with: Marielle De La Paz MD

## 2018-03-23 NOTE — MR AVS SNAPSHOT
After Visit Summary   3/23/2018    Vignesh Fallon    MRN: [id number]           Patient Information     Date Of Birth          1935        Visit Information        Provider Department      3/23/2018 3:00 PM Cynthia Tubbs MD [facility]        Today's Diagnoses     Type 2 diabetes mellitus with other diabetic kidney complication, without long-term current use of insulin (H)    -  1    Primary localized osteoarthrosis of right lower leg        Takes dietary supplements          Care Instructions    Knee pain: either from osteoarthritis or ruptured baker's cyst  -start tylenol 1000 mg three times per day for 14 days  -apply heat to the area  -come back to clinic in 2-3 weeks if not improving          Follow-ups after your visit        Who to contact     Please call your clinic at [phone] to:    Ask questions about your health    Make or cancel appointments    Discuss your medicines    Learn about your test results    Speak to your doctor            Additional Information About Your Visit        MyChart Information     impok is an electronic gateway that provides easy, online access to your medical records. With impok, you can request a clinic appointment, read your test results, renew a prescription or communicate with your care team.     To sign up for impok visit the website at [email or web address]   You will be asked to enter the access code listed below, as well as some personal information. Please follow the directions to create your username and password.     Your access code is: [email or web address]  Expires: 2018  3:00 PM     Your access code will  in 90 days. If you need help or a new code, please contact your [facility] or call [phone] for assistance.        Care EveryWhere ID     This is your Care EveryWhere ID. This could be used by other organizations to access your [facility] medical records  [id number]        Your  "Vitals Were     Pulse Temperature Respirations Height Pulse Oximetry BMI (Body Mass Index)    66 98  F (36.7  C) (Oral) 16 4' 4\" (132.1 cm) 99% 25.38 kg/m2       Blood Pressure from Last 3 Encounters:   03/23/18 166/78   11/30/17 147/70   08/17/17 187/77    Weight from Last 3 Encounters:   03/23/18 97 lb 9.6 oz (44.3 kg)   11/30/17 98 lb 6 oz (44.6 kg)   08/17/17 96 lb 8 oz (43.8 kg)              We Performed the Following     Hemoglobin A1c ([facility])     Lipid Panel ([facility])     TSH  Sensitive ([facility])          Today's Medication Changes          These changes are accurate as of 3/23/18  3:59 PM.  If you have any questions, ask your nurse or doctor.               These medicines have changed or have updated prescriptions.        Dose/Directions    acetaminophen 32 mg/mL solution   Commonly known as:  TYLENOL   This may have changed:    - how much to take  - when to take this  - reasons to take this   Used for:  Primary localized osteoarthrosis of right lower leg   Changed by:  Cynthia Tubbs MD        Dose:  1000 mg   Take 31.25 mLs (1,000 mg) by mouth 3 times daily   Quantity:  473 mL   Refills:  3            Where to get your medicines      These medications were sent to [facility]/pharmacy #[id number] - [place], [place] - [address] [facility]  [address], [place] [address]     Phone:  [phone]     acetaminophen 32 mg/mL solution    ENSURE NUTRITION SHAKE Liqd                Primary Care Provider Office Phone # Fax #    Hope Tinsley [phone]       [facility] [address] [place] [address]        Equal Access to Services     ROYER LYMAN AH: Hadpatrice Fields, waro lueastonadaha, qaybta kaalmada hina, ben omlina. So [facility] [phone].    ATENCIÓN: Si habla español, tiene a bonner disposición servicios gratuitos de asistencia lingüística. Llame al [phone].    We comply with applicable federal civil rights laws " and [place] laws. We do not discriminate on the basis of race, color, national origin, age, disability, sex, sexual orientation, or gender identity.            Thank you!     Thank you for choosing [facility]  for your care. Our goal is always to provide you with excellent care. Hearing back from our patients is one way we can continue to improve our services. Please take a few minutes to complete the written survey that you may receive in the mail after your visit with us. Thank you!             Your Updated Medication List - Protect others around you: Learn how to safely use, store and throw away your medicines at www.disposemymeds.org.          This list is accurate as of 3/23/18  3:59 PM.  Always use your most recent med list.                   Brand Name Dispense Instructions for use Diagnosis    acetaminophen 32 mg/mL solution    TYLENOL    473 mL    Take 31.25 mLs (1,000 mg) by mouth 3 times daily    Primary localized osteoarthrosis of right lower leg       ENSURE NUTRITION SHAKE Liqd     60 Bottle    Take 1 Bottle by mouth 2 times daily    Takes dietary supplements       omeprazole 20 MG tablet     180 tablet    Take 1 tablet (20 mg) by mouth daily as needed Take 30-60 minutes before a meal.    Gastroesophageal reflux disease without esophagitis

## 2018-04-02 NOTE — PROGRESS NOTES
Preceptor Attestation:     Patient seen, evaluated and discussed with the resident, Dr. Cynthia Tubbs. I have verified the content of the note, which accurately reflects my assessment of the patient and the plan of care.  Supervising Physician:Marielle De La Paz MD  [facility]

## 2018-04-06 ENCOUNTER — OFFICE VISIT (OUTPATIENT)
Dept: FAMILY MEDICINE | Facility: CLINIC | Age: 83
End: 2018-04-06
Payer: COMMERCIAL

## 2018-04-06 VITALS
BODY MASS INDEX: 21.66 KG/M2 | TEMPERATURE: 97.7 F | OXYGEN SATURATION: 100 % | DIASTOLIC BLOOD PRESSURE: 70 MMHG | WEIGHT: 93.6 LBS | SYSTOLIC BLOOD PRESSURE: 172 MMHG | HEIGHT: 55 IN | HEART RATE: 57 BPM

## 2018-04-06 DIAGNOSIS — E04.9 GOITER: ICD-10-CM

## 2018-04-06 DIAGNOSIS — E11.29 TYPE 2 DIABETES MELLITUS WITH OTHER DIABETIC KIDNEY COMPLICATION, WITHOUT LONG-TERM CURRENT USE OF INSULIN (H): ICD-10-CM

## 2018-04-06 DIAGNOSIS — E55.9 VITAMIN D DEFICIENCY: ICD-10-CM

## 2018-04-06 DIAGNOSIS — D64.9 ANEMIA, UNSPECIFIED TYPE: ICD-10-CM

## 2018-04-06 DIAGNOSIS — M25.561 ACUTE PAIN OF RIGHT KNEE: Primary | ICD-10-CM

## 2018-04-06 DIAGNOSIS — I10 ESSENTIAL HYPERTENSION: ICD-10-CM

## 2018-04-06 LAB
CHOLEST SERPL-MCNC: 138 MG/DL
FASTING?: NO
HBA1C MFR BLD: 6 % (ref 4.1–5.7)
HDLC SERPL-MCNC: 48 MG/DL
LDLC SERPL CALC-MCNC: 77 MG/DL
TRIGL SERPL-MCNC: 66 MG/DL
TSH SERPL DL<=0.05 MIU/L-ACNC: 0.32 UIU/ML (ref 0.3–5)

## 2018-04-06 RX ORDER — TRIAMCINOLONE ACETONIDE 40 MG/ML
40 INJECTION, SUSPENSION INTRA-ARTICULAR; INTRAMUSCULAR ONCE
Qty: 1 ML | Refills: 0 | OUTPATIENT
Start: 2018-04-06 | End: 2018-04-07

## 2018-04-06 NOTE — MR AVS SNAPSHOT
After Visit Summary   4/6/2018    Vignesh Fallon    MRN: [id number]           Patient Information     Date Of Birth          1935        Visit Information        Provider Department      4/6/2018 4:00 PM Maximino Magallanes MD [facility]        Today's Diagnoses     Acute pain of right knee    -  1    Goiter        Type 2 diabetes mellitus with other diabetic kidney complication, without long-term current use of insulin (H)        Essential hypertension        Anemia, unspecified type        Vitamin D deficiency           Follow-ups after your visit        Additional Services     OPHTHALMOLOGY ADULT REFERRAL       Your provider has referred you to: Schedule at patient preference for diabetic eye exam    Please be aware that coverage of these services is subject to the terms and limitations of your health insurance plan.  Call member services at your health plan with any benefit or coverage questions.      Please bring the following with you to your appointment:    (1) Any X-Rays, CTs or MRIs which have been performed.  Contact the facility where they were done to arrange for  prior to your scheduled appointment.    (2) List of current medications  (3) This referral request   (4) Any documents/labs given to you for this referral                  Who to contact     Please call your clinic at [phone] to:    Ask questions about your health    Make or cancel appointments    Discuss your medicines    Learn about your test results    Speak to your doctor            Additional Information About Your Visit        MyChart Information     DrawQuestt is an electronic gateway that provides easy, online access to your medical records. With [employer], you can request a clinic appointment, read your test results, renew a prescription or communicate with your care team.     To sign up for DrawQuestt visit the website at [email or web address]   You will be asked to enter the access code listed  "below, as well as some personal information. Please follow the directions to create your username and password.     Your access code is: [email or web address]  Expires: 2018  3:00 PM     Your access code will  in 90 days. If you need help or a new code, please contact your [facility] or call [phone] for assistance.        Care EveryWhere ID     This is your Care EveryWhere ID. This could be used by other organizations to access your [facility] medical records  [id number]        Your Vitals Were     Pulse Temperature Height Pulse Oximetry BMI (Body Mass Index)       57 97.7  F (36.5  C) (Oral) 4' 4\" (132.1 cm) 100% 24.34 kg/m2        Blood Pressure from Last 3 Encounters:   18 172/70   18 166/78   17 147/70    Weight from Last 3 Encounters:   18 93 lb 9.6 oz (42.5 kg)   18 97 lb 9.6 oz (44.3 kg)   17 98 lb 6 oz (44.6 kg)              We Performed the Following     DRAIN MEDIUM JOINT/BURSA     Hemoglobin A1c ([facility])     Lipid [place] ([facility] - Results > 1 hr     OPHTHALMOLOGY ADULT REFERRAL     TRIAMCINOLONE ACET INJ NOS     TSH  Sensitive ([facility])          Today's Medication Changes          These changes are accurate as of 18 11:59 PM.  If you have any questions, ask your nurse or doctor.               Start taking these medicines.        Dose/Directions    lisinopril 5 MG tablet   Commonly known as:  PRINIVIL/ZESTRIL   Used for:  Type 2 diabetes mellitus with other diabetic kidney complication, without long-term current use of insulin (H), Essential hypertension   Started by:  Maximino Magallanes MD        Dose:  5 mg   Take 1 tablet (5 mg) by mouth daily   Quantity:  30 tablet   Refills:  0            Where to get your medicines      These medications were sent to [facility]/pharmacy #[id number] - [place], [place] - [address] [facility]  [address], [place] [address]     Phone:  [phone]     lisinopril 5 MG tablet          "       Primary Care Provider Office Phone # Fax #    Hope Tinsley [phone]       [facility] [address] [place] [address]        Equal Access to Services     ALEX LYMAN : Hadii aad ku hadidao Soangeloali, waaxda luqadaha, qaybta kaalmada adeegyada, ben villa laGenevievecrow molina. So [facility] [phone].    ATENCIÓN: Si habla español, tiene a bonner disposición servicios gratuitos de asistencia lingüística. Llame al [phone].    We comply with applicable federal civil rights laws and [place] laws. We do not discriminate on the basis of race, color, national origin, age, disability, sex, sexual orientation, or gender identity.            Thank you!     Thank you for choosing [facility]  for your care. Our goal is always to provide you with excellent care. Hearing back from our patients is one way we can continue to improve our services. Please take a few minutes to complete the written survey that you may receive in the mail after your visit with us. Thank you!             Your Updated Medication List - Protect others around you: Learn how to safely use, store and throw away your medicines at www.disposemymeds.org.          This list is accurate as of 4/6/18 11:59 PM.  Always use your most recent med list.                   Brand Name Dispense Instructions for use Diagnosis    acetaminophen 32 mg/mL solution    TYLENOL    473 mL    Take 31.25 mLs (1,000 mg) by mouth 3 times daily    Primary localized osteoarthrosis of right lower leg       ENSURE NUTRITION SHAKE Liqd     60 Bottle    Take 1 Bottle by mouth 2 times daily    Takes dietary supplements       lisinopril 5 MG tablet    PRINIVIL/ZESTRIL    30 tablet    Take 1 tablet (5 mg) by mouth daily    Type 2 diabetes mellitus with other diabetic kidney complication, without long-term current use of insulin (H), Essential hypertension       omeprazole 20 MG tablet     180 tablet    Take 1 tablet (20 mg)  by mouth daily as needed Take 30-60 minutes before a meal.    Gastroesophageal reflux disease without esophagitis

## 2018-04-06 NOTE — PROGRESS NOTES
HPI:   Vignesh Fallon is a 82 year old  female who presents to clinic today for right sided posterior knee pain. It has been going on for ~1 month. She finds that rest and compression makes it feel better. She also been taking tylenol syrup 1000 mg TID without benefit. It is not catching, but it does feel like there is a bulge in the back of her knee and something inside her knee makes her unable to fully extend it. She denies any trauma to the knee. She has history of osteoarthritis in both knees and was previously given a single left knee injection on 5/25/17 with good improvement in that knee. She previously saw my partner (Dr. Tubbs) for this on 3/23/18.    Additionally, when asked, she states she has had her goiter for many years. Review of records shows low TSH (0.18) on 11/6/15.    Denies abdominal pain, constipation, diarrhea, rash, fever, chills, numbness, weakness, or tingling in the leg, no pain in the hip or ankle.     Patient is an established patient of this clinic.    A [employer]  was used for this visit         PMHX:   Active Problems List  Patient Active Problem List   Diagnosis     Health Care Home     Anemia     Vitamin D deficiency     Chronic kidney disease, stage III (moderate)     Depressive disorder, not elsewhere classified     DM (diabetes mellitus), type 2 with renal complications (H)     Esophageal reflux     Goiter     Essential hypertension     Primary osteoarthritis of both knees     Latent tuberculosis by blood test     Active problem list reviewed and updated.    Current Medications  Current Outpatient Prescriptions   Medication Sig Dispense Refill     acetaminophen (TYLENOL) 32 mg/mL solution Take 31.25 mLs (1,000 mg) by mouth 3 times daily 473 mL 3     Nutritional Supplements (ENSURE NUTRITION SHAKE) LIQD Take 1 Bottle by mouth 2 times daily 60 Bottle 3     omeprazole 20 MG tablet Take 1 tablet (20 mg) by mouth daily as needed Take 30-60 minutes before a meal. 180 tablet 1  "    Medication list reviewed and updated.    Social History  Social History   Substance Use Topics     Smoking status: Never Smoker     Smokeless tobacco: Never Used     Alcohol use No     History   Drug Use No     Allergies  Allergies   Allergen Reactions     No Known Allergies      Allergies and Medication Intolerances Updated         Physical Exam:     Vitals:    04/06/18 1630 04/06/18 1634   BP: 192/79 172/70   Pulse: 57    Temp: 97.7  F (36.5  C)    TempSrc: Oral    SpO2: 100%    Weight: 93 lb 9.6 oz (42.5 kg)    Height: 4' 4\" (132.1 cm)      Body mass index is 24.34 kg/(m^2).    General: Appears well and in no acute distress.  HEENT: Eyes grossly normal to inspection. Extraocular movements intact. Pupils equal, round, and reactive to light. Mucous membranes moist. No ulcers or lesions noted in the oropharynx.  Endocrine: Enlarged thyroid goiter bilateral. No nodules palpated.  Cardiovascular: Regular rate and rhythm, normal S1 and S2 without murmur. No extra heartsounds or friction rub. Radial pulses present and equal bilaterally.  Respiratory: Lungs clear to auscultation bilaterally. No wheezing or crackles. No prolonged expiration. Symmetrical chest rise.  Right Knee Musculoskeletal Exam: Pain to palpation of popliteal fossa. ~5 degrees short of full extension, ~120 degrees of flexion. Grossly normal in appearance. No overlying erythema or ecchymosis. No swelling noted. No tenderness to palpation over joint lines. Full ROM with extension and flexion. Negative Lachman's and posterior drawer. No laxity with varus and valgus stress. No patellar apprehension or pain with patellar compression. Pes anserine bursa nontender.    Results for LITTLE CHANDLER (MRN [id number]) as of 4/7/2018 18:40   Ref. Range 4/6/2018 16:41   Hemoglobin A1C Latest Ref Range: 4.1 - 5.7 % 6.0 (H)       Results for GERALDINEANGELINEG (MRN [id number]) as of 4/7/2018 18:40   Ref. Range 4/6/2018 17:17   Cholesterol Latest Ref Range: <=199 mg/dL 138   HDL " Cholesterol Latest Ref Range: >=50 mg/dL 48 (L)   LDL Cholesterol Calculated Latest Ref Range: <=129 mg/dL 77   Triglycerides Latest Ref Range: <=149 mg/dL 66   TSH Latest Ref Range: 0.30 - 5.00 uIU/mL 0.32       Assessment and Plan   1. Acute pain of right knee: Likely Baker's cyst.  No trauma.  No signs of infection.  Likely loose body.  Trial joint injection and follow-up in 2 weeks for blood work.  - triamcinolone acetonide (KENALOG-40) 40 MG/ML injection; 1 mL (40 mg) by INTRA-ARTICULAR route once for 1 dose  Dispense: 1 mL; Refill: 0  - lidocaine 1 % SOLN; 5 mLs by INTRA-ARTICULAR route once for 1 dose  Dispense: 5 mL; Refill: 0  - DRAIN MEDIUM JOINT/BURSA  - TRIAMCINOLONE ACET INJ NOS    2. Goiter: Noted on exam today.  Previously slightly decreased TSH in 2015.  Recheck today normal.  And next visit consider obtaining TPO antibodies looking for hashimotos and scheduling ultrasound.  - TSH  Sensitive ([facility])  - TPO FUTURE    3. Type 2 diabetes mellitus with other diabetic kidney complication, without long-term current use of insulin (H): A1c of 6.0.  Possibly falsely low given history of anemia. Not on meds. CKD so metformin not ideal and will not start. Did not obtain hemoglobin today as this was a new patient to me. Should obtain CBC at follow-up appointment approximately a month.  Lipids look okay slightly low HDL 48.  Ophthalmology referral given.  Did not obtain diabetic foot exam will need to next visit.  - Hemoglobin A1c ([facility])  - Lipid Halfway ([facility]) - Results > 1 hr  - Ophthalmology referral  - Start Lisinopril 5 mg daily    4. Essential hypertension: Significantly hypertensive here.  Asymptomatic.  Start lisinopril 5 mg daily.  Follow-up in 2 weeks with BMP.  Likely increase to 10 mg at that time.  Given significant hypertension will likely need secondary agent but should maximize ACE inhibitor first.  - lisinopril (PRINIVIL/ZESTRIL) 5 MG tablet; Take 1 tablet (5 mg) by mouth daily   Dispense: 30 tablet; Refill: 0  - Basic Metabolic Panel ([facility])  - Results < 1 hr; Future    5. Anemia, unspecified type: Did not see anemia problems list initially.  Unclear if this is been worked up or if it has resolved as I do not see iron studies here. Ferritin while hospitalized in 2014 was 633. Normal MCV in the past with anemia.  Obtain CBC at next visit.  Anemia may be falsely lowering her A1c.  Follow-up in 2 weeks for blood work as above.  - CBC with Diff Plt ([facility]); Future    6. Vitamin D deficiency: Did not see vitamin D deficiency problems list initially.  Does not currently take vitamin D.  Recheck vitamin D level in 2 weeks.  - Vitamin D 25-Hydroxy ([facility]); Future    Schedule follow-up appointment in 2 weeks.      Options for treatment and follow-up care were reviewed with the patient and/or guardian. Vignesh Fallon and/or guardian engaged in the decision making process and verbalized understanding of the options discussed and agreed with the final plan.    Maximino Magallanes MD  [facility] Resident  Pager# [phone]    Precepted with: Gail Mendoza MD      This chart is completed utilizing dictation software; typos and/or incorrect word substitutions may unintentionally occur.                 Procedure Note - Right Knee Joint Injection    PRE-OP DIAGNOSIS: Osteoarthritis and Right bakers cyst    POST-OP DIAGNOSIS: Same     Vignesh Fallon is a patient of [facility]     CONSENT: Affirmation of informed consent was signed and scanned into the medical record. Risks, benefits and alternatives were discussed. Patient's questions were elicited and answered.     Procedure safety checklist was completed:  Yes  Time Out (Pause for the Cause) completed: Yes    PRE-PROCEDURE PREPORATION:  The right knee was prepped  in the usual fashion.    INJECTION:    Using 5 cc of 1% lidocaine mixed with 1 mL of Triamcinolone acetonide (40 mg/mL), the right knee joint was successfully  injected without complication. Bandaid applied. Routine care discussed.    Triamcinolone NDC [address]-0    FOLLOW UP: Pt was instructed to call if severe pain, redness, warmth or other concerns     Maximino Magallanes MD  [facility] Resident  Pager# [phone]    Precepted with: Gail Mendoza MD who was present for the entire procedure.

## 2018-04-07 PROBLEM — Z22.7 LATENT TUBERCULOSIS BY BLOOD TEST: Status: ACTIVE | Noted: 2017-08-17

## 2018-04-07 PROBLEM — D64.9 ANEMIA, UNSPECIFIED TYPE: Status: ACTIVE | Noted: 2018-04-07

## 2018-04-07 RX ORDER — LISINOPRIL 5 MG/1
5 TABLET ORAL DAILY
Qty: 30 TABLET | Refills: 0 | Status: SHIPPED | OUTPATIENT
Start: 2018-04-07 | End: 2018-05-01

## 2018-04-09 ENCOUNTER — TELEPHONE (OUTPATIENT)
Dept: FAMILY MEDICINE | Facility: CLINIC | Age: 83
End: 2018-04-09

## 2018-04-09 NOTE — TELEPHONE ENCOUNTER
Called and informed patient with the Lisinopril 5 mg at the pharmacy with instructed to take 1 pill daily and return to clinic in 2 weeks for blood work.    Patient also stated that she got a knee injection 2 weeks ago and the injection not helping her knee pain at all and she is wonder can be another injection possible?

## 2018-04-09 NOTE — PROGRESS NOTES
Preceptor Attestation:   Patient seen, evaluated and discussed with the resident. I was present for and supervised the entire procedure. I have verified the content of the note, which accurately reflects my assessment of the patient and the plan of care.  Supervising Physician:Gail Mendoza MD  [facility]

## 2018-04-09 NOTE — TELEPHONE ENCOUNTER
----- Message from Rivka Granger CMA sent at 4/9/2018  9:20 AM CDT -----  Tania,    Could you please call this patient and give them the message from Dr. Tinsley.    Thank you,    Rivka  ----- Message -----     From: Maximino Magallanes MD     Sent: 4/7/2018   7:22 PM       To: Rivka Granger CMA    Please call patient with language line. Ask them to  a blood pressure medication called lisinopril and I would like her to take it once daily at 5 mg. She should see me again in 2 weeks to do repeat blood work and look at her thyroid gland more. I am happy to answer any question she has and can forward them too me.    Maximino Magallanes MD  [facility] Resident  Pager# [phone]

## 2018-05-01 DIAGNOSIS — I10 ESSENTIAL HYPERTENSION: ICD-10-CM

## 2018-05-01 DIAGNOSIS — E11.29 TYPE 2 DIABETES MELLITUS WITH OTHER DIABETIC KIDNEY COMPLICATION, WITHOUT LONG-TERM CURRENT USE OF INSULIN (H): ICD-10-CM

## 2018-05-01 RX ORDER — LISINOPRIL 5 MG/1
5 TABLET ORAL DAILY
Qty: 90 TABLET | OUTPATIENT
Start: 2018-05-01

## 2018-05-01 RX ORDER — LISINOPRIL 5 MG/1
5 TABLET ORAL DAILY
Qty: 30 TABLET | Refills: 0 | Status: SHIPPED | OUTPATIENT
Start: 2018-05-01 | End: 2018-05-04

## 2018-05-04 ENCOUNTER — OFFICE VISIT (OUTPATIENT)
Dept: FAMILY MEDICINE | Facility: CLINIC | Age: 83
End: 2018-05-04
Payer: COMMERCIAL

## 2018-05-04 VITALS
SYSTOLIC BLOOD PRESSURE: 94 MMHG | HEART RATE: 58 BPM | OXYGEN SATURATION: 96 % | TEMPERATURE: 97.4 F | BODY MASS INDEX: 24.44 KG/M2 | DIASTOLIC BLOOD PRESSURE: 56 MMHG | WEIGHT: 94 LBS

## 2018-05-04 DIAGNOSIS — E04.9 GOITER: ICD-10-CM

## 2018-05-04 DIAGNOSIS — M17.11 PRIMARY LOCALIZED OSTEOARTHROSIS OF RIGHT LOWER LEG: ICD-10-CM

## 2018-05-04 DIAGNOSIS — I10 ESSENTIAL HYPERTENSION: ICD-10-CM

## 2018-05-04 DIAGNOSIS — K21.9 GASTROESOPHAGEAL REFLUX DISEASE, ESOPHAGITIS PRESENCE NOT SPECIFIED: ICD-10-CM

## 2018-05-04 DIAGNOSIS — D64.9 ANEMIA, UNSPECIFIED TYPE: ICD-10-CM

## 2018-05-04 DIAGNOSIS — N18.4 CHRONIC KIDNEY DISEASE, STAGE 4 (SEVERE) (H): Primary | ICD-10-CM

## 2018-05-04 LAB
BACTERIA: NORMAL
BILIRUBIN UR: NEGATIVE
BLOOD UR: ABNORMAL
BUN SERPL-MCNC: 65 MG/DL (ref 7–30)
CALCIUM SERPL-MCNC: 8.9 MG/DL (ref 8.5–10.4)
CASTS: NORMAL /LPF
CHLORIDE SERPLBLD-SCNC: 112 MMOL/L (ref 94–109)
CLARITY, URINE: CLEAR
CO2 SERPL-SCNC: 20 MMOL/L (ref 20–32)
COLOR UR: YELLOW
CREAT SERPL-MCNC: 2.8 MG/DL (ref 0.6–1.3)
CREAT UR-MCNC: 115.1 MG/DL
CRYSTAL URINE: NORMAL /LPF
EGFR CALCULATED (BLACK REFERENCE): 20.8 ML/MIN
EGFR CALCULATED (NON BLACK REFERENCE): 17.2 ML/MIN
EPITHELIAL CELLS UR: NORMAL /LPF (ref 0–2)
GLUCOSE SERPL-MCNC: 109 MG/DL (ref 60–109)
GLUCOSE URINE: NEGATIVE
HCT VFR BLD AUTO: 30.7 % (ref 35–47)
HEMOGLOBIN: 9.6 G/DL (ref 11.7–15.7)
KETONES UR QL: NEGATIVE
LEUKOCYTE ESTERASE UR: ABNORMAL
MCH RBC QN AUTO: 27.1 PG (ref 26.5–35)
MCHC RBC AUTO-ENTMCNC: 31.3 G/DL (ref 32–36)
MCV RBC AUTO: 86.7 FL (ref 78–100)
MICROALBUMIN UR-MCNC: 46.33 MG/DL (ref 0–1.99)
MICROALBUMIN/CREAT UR: 402.5 MG/G
MUCOUS URINE: NORMAL LPF
NITRITE UR QL STRIP: NEGATIVE
PH UR STRIP: 5.5 [PH] (ref 5–7)
PLATELET # BLD AUTO: 298 K/UL (ref 150–450)
POTASSIUM SERPL-SCNC: 4.4 MMOL/L (ref 3.4–5.3)
PROTEIN UR: ABNORMAL
RBC # BLD AUTO: 3.54 M/UL (ref 3.8–5.2)
RBC URINE: <2 /HPF
SODIUM SERPL-SCNC: 144 MMOL/L (ref 133–144)
SP GR UR STRIP: 1.01
UROBILINOGEN UR STRIP-ACNC: ABNORMAL
WBC # BLD AUTO: 4.6 K/UL (ref 4–11)
WBC URINE: NORMAL /HPF

## 2018-05-04 RX ORDER — FAMOTIDINE 20 MG/1
20 TABLET, FILM COATED ORAL 2 TIMES DAILY
Qty: 120 TABLET | Refills: 1 | Status: SHIPPED | OUTPATIENT
Start: 2018-05-04 | End: 2018-05-04 | Stop reason: DRUGHIGH

## 2018-05-04 RX ORDER — ACETAMINOPHEN 500 MG
1000 TABLET ORAL 3 TIMES DAILY
Qty: 200 TABLET | Refills: 11 | Status: SHIPPED | OUTPATIENT
Start: 2018-05-04 | End: 2019-04-19

## 2018-05-04 RX ORDER — FAMOTIDINE 10 MG
10 TABLET ORAL 2 TIMES DAILY
Qty: 120 TABLET | Refills: 1 | Status: SHIPPED | OUTPATIENT
Start: 2018-05-04 | End: 2018-09-04

## 2018-05-04 RX ORDER — LISINOPRIL 5 MG/1
5 TABLET ORAL DAILY
Qty: 90 TABLET | Refills: 1 | Status: SHIPPED | OUTPATIENT
Start: 2018-05-04 | End: 2018-11-15

## 2018-05-04 NOTE — PATIENT INSTRUCTIONS
Continue to take lisinopril 5mg daily.  Start taking Tylenol 1000mg 3 times a day.  Use Aspercreme, salonpas patches, or lidocaine patches on the knee as needed for pain.      Referral for Nephrology along with OV notes and labs faxed to [facility]  [phone]  [phone]  Clinic will contact patient to schedule appointment.      7.18.18 [facility] consult notes 6.22.18 to Dr. Tinsley. CXiong, ARCENIO.

## 2018-05-04 NOTE — NURSING NOTE
Chief Complaint   Patient presents with     RECHECK     follow up on leg     Medication Reconciliation     completed       BP 94/56  Pulse 58  Temp 97.4  F (36.3  C) (Oral)  Wt 94 lb (42.6 kg)  SpO2 96%  BMI 24.44 kg/m2      Due to patient being non-English speaking/uses sign language, an  was used for this visit. Only for face-to-face interpretation by an external agency, date and length of interpretation can be found on the scanned worksheet.     name: Kymberly Santana  Agency: [employer]  Language:Zion  Telephone number: [phone]  Type of interpretation: Face-to-face, spoken

## 2018-05-04 NOTE — PROGRESS NOTES
"       ARLENE       Vignesh Fallon is a 82 year old female who presents for:  Chief Complaint   Patient presents with     RECHECK     follow up on leg     Medication Reconciliation     completed       Leg follow up  - was seen on 4/16 at which time her right knee was injected with steroids. She has had injections in the past which has helped with her pain, but this one did not provide any relief for any period of time.  - difficulty with ambulation  - knee xray from November 2017 with verus deformity  - not doing any cares at home for the chronic knee pain.    HTN  - elevated at last: 192/79, 172/70 (elevated on prior visits as well). Started on 5mg of lisinopril. She has been taking it daily and tolerating it well.  No dizziness, no light-headedness. No cough. Today her blood pressure is lower than expected while starting just 5mg of lisinopril.    CKD 4  - eGFR in the low 20's.  - hx of DM. Most recent hgbA1c 6.0 in April 2018. Has been <7 since ~2013.  - hx of HTN. Recently started lisinopril as discussed above.  - has never seen a nephrologist and none seen in care everywhere.    Normocytic anemia  - hgb 9.6 with baseline 9-10.  - MCV in the mid 80s  - ferritin high in 2014 (633)    Goiter   - large goiter present  - TSH wnl in April 2018.  - patient denies any further workup. No records seen in care everywhere, although \"goiter\" listed in [facility] clinic summary problem list.    A [employer]  was used for this visit     ROS: Complete 6 point ROS completed and negative other than stated above.         Physical Exam:     Vitals:    05/04/18 1446   BP: 94/56   Pulse: 58   Temp: 97.4  F (36.3  C)   TempSrc: Oral   SpO2: 96%   Weight: 94 lb (42.6 kg)     Body mass index is 24.44 kg/(m^2).  Vitals were reviewed and were normal    General: Alert and orientated in NAD. Pleasant and cooperative.  Neck: large goiter R>L  Cards: RRR, no murmurs, rubs or gallops. No lower extremity edema  Resp: clear vesicular breath sounds " bilaterally, no crackles or wheezes. No increased work of breathing  MSK: right knee with pain along the medial joint line. Full extension on left knee but not right knee. Full flexion with both knees. No effusion or skin changes.  Skin: no rashes, lesions, or lacerations  Psych: mood good, affect congruent        Results:     Results for orders placed or performed in visit on 05/04/18   Basic Metabolic Panel ([facility])  - Results < 1 hr   Result Value Ref Range    Glucose 109.0 60.0 - 109.0 mg/dL    Urea Nitrogen 65.0 (H) 7.0 - 30.0 mg/dL    Creatinine 2.8 (H) 0.6 - 1.3 mg/dL    Sodium 144.0 133.0 - 144.0 mmol/L    Potassium 4.4 3.4 - 5.3 mmol/L    Chloride 112.0 (H) 94.0 - 109.0 mmol/L    Carbon Dioxide 20.0 20.0 - 32.0 mmol/L    Calcium 8.9 8.5 - 10.4 mg/dL    eGFR Calculated (Non Black Reference) 17.2 (L) >60.0 mL/min    eGFR Calculated (Black Reference) 20.8 (L) >60.0 mL/min   CBC with Plt ([facility])   Result Value Ref Range    WBC 4.6 4.0 - 11.0 K/uL    RBC 3.54 (L) 3.80 - 5.20 M/uL    Hemoglobin 9.6 (L) 11.7 - 15.7 g/dL    Hematocrit 30.7 (L) 35.0 - 47.0 %    MCV 86.7 78.0 - 100.0 fL    MCH 27.1 26.5 - 35.0 pg    MCHC 31.3 (L) 32.0 - 36.0 g/dL    Platelets 298.0 150.0 - 450.0 K/uL   Urinalysis, Micro If ([facility])   Result Value Ref Range    Specific Gravity Urine 1.015 1.005 - 1.030    pH Urine 5.5 4.5 - 8.0    Leukocyte Esterase UR Trace (A) NEGATIVE    Nitrite Urine Negative NEGATIVE    Protein UR 2+ (A) NEGATIVE    Glucose Urine Negative NEGATIVE    Ketones Urine Negative NEGATIVE    Urobilinogen mg/dL 0.2 E.U./dL 0.2 E.U./dL    Bilirubin UR Negative NEGATIVE    Blood UR Trace-intact (A) NEGATIVE    Color Urine Yellow     Clarity, urine Clear    Microalbumin Creatinine Ratio Random Ur ([facility])   Result Value Ref Range    Microalbumin, Urine 46.33 (H) 0.00 - 1.99 mg/dL    Creatinine, Urine 115.1 mg/dL    Albumin Urine mg/g Cr 402.5 (H) <=19.9 mg/g    Narrative    Test performed by:  [facility]  LABORATORY  [address] [place] [address]  Microalbumin, Random Urine  <2.0 mg/dL . . . . . . . . Normal  3.0-30.0 mg/dL . . . . . . Microalbuminuria  >30.0 mg/dL . . . . . .  . Clinical Proteinuria  Microalbumin/Creatinine Ratio, Random Urine  <20 mg/g . . . . .. . . . Normal   mg/g . . . . . . . Microalbuminuria  >300 mg/g . . . . . . . . Clinical Proteinuria   Urine Microscopic ([facility])   Result Value Ref Range    WBC Urine 10-25 <5 /hpf    RBC Urine <2 <5 /hpf    Epithelial Cells UR 10-25 0 - 2 /lpf    Mucous Urine None NONE lpf    Casts Urine None NONE /lpf    Crystal Urine None NONE /lpf    Bacteria Wet Prep Many None         Assessment and Plan     1. Chronic kidney disease, stage 4 (severe) (H)  BMP today with creatinine 2.8, eGFR 17.2, bicarb 20, BUN 65. UA with protein, albumin:creatinine 402.5. Likely d/t combination of DM and HTN. DM controlled and now on ACEI for HTN. Has never seen nephrology in the past. States chronic fatigue, possibly d/t uremia.  - NEPHROLOGY ADULT REFERRAL  - continue on ACEI  - no bicarb at this time as bicarb is not <20.    2. Anemia, unspecified type  Ferritin elevated in the past, most c/w anemia of chronic disease/CKD4.  - follow up with nephrology    3. Essential hypertension  BP significantly improved with just 5mg ACEI. Asymptomatic at current blood pressure. BMP increased by 30% since initiation. At this point, given unusual drop in BP, patient is asymptomatic, and CKD4 with proteinuria, will continue on 5mg daily lisinopril.  - lisinopril (PRINIVIL/ZESTRIL) 5 MG tablet; Take 1 tablet (5 mg) by mouth daily  Dispense: 90 tablet; Refill: 1  - RTC in 2 weeks for repeat BP and BMP    4. Primary localized osteoarthrosis of right lower leg  Recent injection with no relief. Either difficult access point with significant arthritis or no improvement in pain control. Will treat with scheduled tylenol and OTC lidocaine/salon patches for the next ~3 months until repeat  injection is due. At that time, consider ortho referral. Could also consider ortho consult sooner if pain worsens. Would like to focus on renal disease first.  - tylenol 1000mg tid  - otc salon/lidocaine patches  - ice/heat prn    5. Gastroesophageal reflux disease, esophagitis presence not specified  No hx of ulcer. D/c omeprazole and start H2 blocker.  - famotidine (PEPCID) 10 MG tablet; Take 1 tablet (10 mg) by mouth 2 times daily  Dispense: 120 tablet; Refill: 1    6. Goiter  TSH wnl. Will need US in the future.    ADDITIONAL HISTORY SUMMARIZED (2): Care-everywhere for renal and thyroid.  DECISION TO OBTAIN EXTRA INFORMATION (1): None.   RADIOLOGY TESTS (1): Knee xray from November 2017.  LABS (1): BMP, TSH, CBC  MEDICINE TESTS (1): None.  INDEPENDENT REVIEW (2 each): None.     Follow up in 2 weeks for repeat BP and BMP, consider thyroid US.    Options for treatment and follow-up care were reviewed with the patient. Vignesh Fallon  engaged in the decision making process and verbalized understanding of the options discussed and agreed with the final plan.    Precepted with: DO Hope Barreto MD (PGY2)  Pager: [phone]  [facility] Resident

## 2018-05-04 NOTE — MR AVS SNAPSHOT
After Visit Summary   5/4/2018    Vignesh Fallon    MRN: [id number]           Patient Information     Date Of Birth          1935        Visit Information        Provider Department      5/4/2018 2:40 PM Hope Tinsley MD [facility]        Today's Diagnoses     Chronic kidney disease, stage 4 (severe) (H)    -  1    Anemia, unspecified type        Essential hypertension        Primary localized osteoarthrosis of right lower leg        Gastroesophageal reflux disease, esophagitis presence not specified        Goiter          Care Instructions    Continue to take lisinopril 5mg daily.  Start taking Tylenol 1000mg 3 times a day.  Use Aspercreme, salonpas patches, or lidocaine patches on the knee as needed for pain.          Follow-ups after your visit        Additional Services     NEPHROLOGY ADULT REFERRAL       Patient prefers to be called    Reason for Referral: CKD4, likely d/t DM and HTN     needed: Yes  Language: Hmong    May leave message on voicemail: Yes    ([facility] Only) Referral should be tracked (Yes/No)? Yes                  Your next 10 appointments already scheduled     May 18, 2018  3:20 PM CDT   Return Visit with Hope Tinsley MD   [facility] ([facility])    [address] [place] [address]   [phone]              Who to contact     Please call your clinic at [phone] to:    Ask questions about your health    Make or cancel appointments    Discuss your medicines    Learn about your test results    Speak to your doctor            Additional Information About Your Visit        MyChart Information     Advanced Image Enhancementhart is an electronic gateway that provides easy, online access to your medical records. With [employer], you can request a clinic appointment, read your test results, renew a prescription or communicate with your care team.     To sign up for Advanced Image Enhancementhart visit the website at [email or web address]   You will be asked to  enter the access code listed below, as well as some personal information. Please follow the directions to create your username and password.     Your access code is: [email or web address]  Expires: 2018  1:32 PM     Your access code will  in 90 days. If you need help or a new code, please contact your [facility] or call [phone] for assistance.        Care EveryWhere ID     This is your Care EveryWhere ID. This could be used by other organizations to access your [place] medical records  [id number]        Your Vitals Were     Pulse Temperature Pulse Oximetry BMI (Body Mass Index)          58 97.4  F (36.3  C) (Oral) 96% 24.44 kg/m2         Blood Pressure from Last 3 Encounters:   18 94/56   18 172/70   18 166/78    Weight from Last 3 Encounters:   18 94 lb (42.6 kg)   18 93 lb 9.6 oz (42.5 kg)   18 97 lb 9.6 oz (44.3 kg)              We Performed the Following     Basic Metabolic Panel ([facility])  - Results < 1 hr     CBC with Plt ([facility])     Microalbumin Creatinine Ratio Random Ur ([facility])     NEPHROLOGY ADULT REFERRAL     Urinalysis, Micro If ([facility])     Urine Microscopic ([facility])          Today's Medication Changes          These changes are accurate as of 18 11:59 PM.  If you have any questions, ask your nurse or doctor.               Start taking these medicines.        Dose/Directions    acetaminophen 500 MG tablet   Commonly known as:  TYLENOL   Replaces:  acetaminophen 32 mg/mL solution   Started by:  Hope Tinsley MD        Dose:  1000 mg   Take 2 tablets (1,000 mg) by mouth 3 times daily   Quantity:  200 tablet   Refills:  11       famotidine 10 MG tablet   Commonly known as:  PEPCID   Used for:  Gastroesophageal reflux disease, esophagitis presence not specified   Started by:  Hope Tinsley MD        Dose:  10 mg   Take 1 tablet (10 mg) by mouth 2 times daily   Quantity:  120 tablet   Refills:  1          Stop taking these medicines if you haven't already. Please contact your care team if you have questions.     acetaminophen 32 mg/mL solution   Commonly known as:  TYLENOL   Replaced by:  acetaminophen 500 MG tablet   Stopped by:  Hope Tinsley MD           omeprazole 20 MG tablet   Stopped by:  Hope Tinsley MD                Where to get your medicines      These medications were sent to [facility]pharm[facility] #[id number] - [place], [place] - [address] [facility]  [address], [place] [address]     Phone:  [phone]     acetaminophen 500 MG tablet    famotidine 10 MG tablet    lisinopril 5 MG tablet                Primary Care Provider Office Phone # Fax #    Hope Tinsley [phone]       [facility] [address] [place] [address]        Equal Access to Services     [facility]: Hadii aad ku hadasho Soomaali, waaxda luqadaha, qaybta kaalmada adeegyada, waxay ashleyin hayaan mikey garcia . So [facility] [phone].    ATENCIÓN: Si habla español, tiene a bonner disposición servicios gratuitos de asistencia lingüística. [facility] [phone].    We comply with applicable federal civil rights laws and [place] laws. We do not discriminate on the basis of race, color, national origin, age, disability, sex, sexual orientation, or gender identity.            Thank you!     Thank you for choosing [facility]  for your care. Our goal is always to provide you with excellent care. Hearing back from our patients is one way we can continue to improve our services. Please take a few minutes to complete the written survey that you may receive in the mail after your visit with us. Thank you!             Your Updated Medication List - Protect others around you: Learn how to safely use, store and throw away your medicines at www.disposemymeds.org.          This list is accurate as of 5/4/18 11:59 PM.  Always use your most recent med list.                   Brand  Name Dispense Instructions for use Diagnosis    acetaminophen 500 MG tablet    TYLENOL    200 tablet    Take 2 tablets (1,000 mg) by mouth 3 times daily        ENSURE NUTRITION SHAKE Liqd     60 Bottle    Take 1 Bottle by mouth 2 times daily    Takes dietary supplements       famotidine 10 MG tablet    PEPCID    120 tablet    Take 1 tablet (10 mg) by mouth 2 times daily    Gastroesophageal reflux disease, esophagitis presence not specified       lisinopril 5 MG tablet    PRINIVIL/ZESTRIL    90 tablet    Take 1 tablet (5 mg) by mouth daily    Essential hypertension

## 2018-05-09 NOTE — PROGRESS NOTES
Preceptor Attestation:   Patient seen, evaluated and discussed with the resident. I have verified the content of the note, which accurately reflects my assessment of the patient and the plan of care.  Supervising Physician:Noemí Redding DO [facility]

## 2018-05-23 ENCOUNTER — DOCUMENTATION ONLY (OUTPATIENT)
Dept: FAMILY MEDICINE | Facility: CLINIC | Age: 83
End: 2018-05-23

## 2018-06-01 ENCOUNTER — MEDICAL CORRESPONDENCE (OUTPATIENT)
Dept: HEALTH INFORMATION MANAGEMENT | Facility: CLINIC | Age: 83
End: 2018-06-01

## 2018-06-01 ENCOUNTER — OFFICE VISIT (OUTPATIENT)
Dept: FAMILY MEDICINE | Facility: CLINIC | Age: 83
End: 2018-06-01
Payer: COMMERCIAL

## 2018-06-01 ENCOUNTER — TELEPHONE (OUTPATIENT)
Dept: FAMILY MEDICINE | Facility: CLINIC | Age: 83
End: 2018-06-01

## 2018-06-01 VITALS
RESPIRATION RATE: 16 BRPM | HEART RATE: 59 BPM | TEMPERATURE: 97.5 F | OXYGEN SATURATION: 98 % | SYSTOLIC BLOOD PRESSURE: 126 MMHG | DIASTOLIC BLOOD PRESSURE: 55 MMHG

## 2018-06-01 DIAGNOSIS — F33.1 MODERATE EPISODE OF RECURRENT MAJOR DEPRESSIVE DISORDER (H): ICD-10-CM

## 2018-06-01 DIAGNOSIS — N18.4 CHRONIC KIDNEY DISEASE, STAGE 4 (SEVERE) (H): ICD-10-CM

## 2018-06-01 DIAGNOSIS — E04.9 GOITER: ICD-10-CM

## 2018-06-01 DIAGNOSIS — M17.0 PRIMARY OSTEOARTHRITIS OF BOTH KNEES: ICD-10-CM

## 2018-06-01 DIAGNOSIS — I10 ESSENTIAL HYPERTENSION: Primary | ICD-10-CM

## 2018-06-01 LAB
BUN SERPL-MCNC: 47 MG/DL (ref 7–30)
CALCIUM SERPL-MCNC: 8.6 MG/DL (ref 8.5–10.4)
CHLORIDE SERPLBLD-SCNC: 111 MMOL/L (ref 94–109)
CO2 SERPL-SCNC: 20 MMOL/L (ref 20–32)
CREAT SERPL-MCNC: 2.3 MG/DL (ref 0.6–1.3)
EGFR CALCULATED (BLACK REFERENCE): 26.1 ML/MIN
EGFR CALCULATED (NON BLACK REFERENCE): 21.6 ML/MIN
GLUCOSE SERPL-MCNC: 170 MG/DL (ref 60–109)
POTASSIUM SERPL-SCNC: 3.9 MMOL/L (ref 3.4–5.3)
SODIUM SERPL-SCNC: 140 MMOL/L (ref 133–144)

## 2018-06-01 NOTE — TELEPHONE ENCOUNTER
Faxed DME orders to [facility]  For 4 wheeled walker with seat and crutches   Included insurance information.    Faxed 3:35 Lbetz

## 2018-06-01 NOTE — NURSING NOTE
Due to patient being non-English speaking/uses sign language, an  was used for this visit. Only for face-to-face interpretation by an external agency, date and length of interpretation can be found on the scanned worksheet.     name: Mary Huffman  Agency: [employer]  Language: Zion   Telephone number: [phone]  Type of interpretation: Face-to-face, spoken

## 2018-06-01 NOTE — MR AVS SNAPSHOT
After Visit Summary   2018    Vignesh Fallon    MRN: [id number]           Patient Information     Date Of Birth          1935        Visit Information        Provider Department      2018 2:20 PM Hope Tinsley MD [facility]        Today's Diagnoses     Essential hypertension    -  1    Chronic kidney disease, stage 4 (severe) (H)        Primary osteoarthritis of both knees        Goiter        Moderate episode of recurrent major depressive disorder (H)           Follow-ups after your visit        Your next 10 appointments already scheduled     2018 10:20 AM CDT   Return Visit with Hope Tinsley MD   [facility] ([facility])    [address] [place] [address]   [phone]              Who to contact     Please call your clinic at [phone] to:    Ask questions about your health    Make or cancel appointments    Discuss your medicines    Learn about your test results    Speak to your doctor            Additional Information About Your Visit        MyChart Information     Yuanfen~Flowâ„¢ is an electronic gateway that provides easy, online access to your medical records. With Yuanfen~Flowâ„¢, you can request a clinic appointment, read your test results, renew a prescription or communicate with your care team.     To sign up for [employer] visit the website at [email or web address]   You will be asked to enter the access code listed below, as well as some personal information. Please follow the directions to create your username and password.     Your access code is: [email or web address]  Expires: 2018  1:32 PM     Your access code will  in 90 days. If you need help or a new code, please contact your [facility] or call [phone] for assistance.        Care EveryWhere ID     This is your Care EveryWhere ID. This could be used by other organizations to access your [place] medical records  [id number]         Your Vitals Were     Pulse Temperature Respirations Pulse Oximetry          59 97.5  F (36.4  C) (Oral) 16 98%         Blood Pressure from Last 3 Encounters:   06/01/18 126/55   05/04/18 94/56   04/06/18 172/70    Weight from Last 3 Encounters:   05/04/18 94 lb (42.6 kg)   04/06/18 93 lb 9.6 oz (42.5 kg)   03/23/18 97 lb 9.6 oz (44.3 kg)              We Performed the Following     Basic Metabolic Panel ([facility]) - Results < 1 hr          Today's Medication Changes          These changes are accurate as of 6/1/18 11:59 PM.  If you have any questions, ask your nurse or doctor.               Start taking these medicines.        Dose/Directions    order for DME   Used for:  Primary osteoarthritis of both knees, Chronic kidney disease, stage 4 (severe) (H)   Started by:  Hope Tinsley MD        Equipment being ordered: Crutches   Quantity:  1 Units   Refills:  0       order for DME   Used for:  Primary osteoarthritis of both knees, Chronic kidney disease, stage 4 (severe) (H)   Started by:  Hope Tinsley MD        Equipment being ordered: 4 wheeled walker   Quantity:  1 Units   Refills:  0            Where to get your medicines      Some of these will need a paper prescription and others can be bought over the counter.  Ask your nurse if you have questions.     Bring a paper prescription for each of these medications     order for DME    order for DME                Primary Care Provider Office Phone # Fax #    Hope Tinsley [phone]       [facility] [address] [place] [address]        Equal Access to Services     ALEX LYMAN [facility]: Hadpatrice villarreal Somery, waaxda luqadaha, qaybta kaalmada hina, ben idiin hayaan ademaxx molina. So [facility] [phone].    ATENCIÓN: Si habla español, tiene a bonner disposición servicios gratuitos de asistencia lingüística. Llame al [phone].    We comply with applicable federal civil rights laws and [place]  laws. We do not discriminate on the basis of race, color, national origin, age, disability, sex, sexual orientation, or gender identity.            Thank you!     Thank you for choosing [facility]  for your care. Our goal is always to provide you with excellent care. Hearing back from our patients is one way we can continue to improve our services. Please take a few minutes to complete the written survey that you may receive in the mail after your visit with us. Thank you!             Your Updated Medication List - Protect others around you: Learn how to safely use, store and throw away your medicines at www.disposemymeds.org.          This list is accurate as of 6/1/18 11:59 PM.  Always use your most recent med list.                   Brand Name Dispense Instructions for use Diagnosis    acetaminophen 500 MG tablet    TYLENOL    200 tablet    Take 2 tablets (1,000 mg) by mouth 3 times daily        ENSURE NUTRITION SHAKE Liqd     60 Bottle    Take 1 Bottle by mouth 2 times daily    Takes dietary supplements       famotidine 10 MG tablet    PEPCID    120 tablet    Take 1 tablet (10 mg) by mouth 2 times daily    Gastroesophageal reflux disease, esophagitis presence not specified       lisinopril 5 MG tablet    PRINIVIL/ZESTRIL    90 tablet    Take 1 tablet (5 mg) by mouth daily    Essential hypertension       order for DME     1 Units    Equipment being ordered: Crutches    Primary osteoarthritis of both knees, Chronic kidney disease, stage 4 (severe) (H)       order for DME     1 Units    Equipment being ordered: 4 wheeled walker    Primary osteoarthritis of both knees, Chronic kidney disease, stage 4 (severe) (H)

## 2018-06-01 NOTE — PROGRESS NOTES
ARLENE       Vignesh Fallon is a 82 year old female who presents for:  Chief Complaint   Patient presents with     Pain     all over body aches. abdominal pain. nausea, fatigue x 2 weeks     Orders     prescription for walker       Ambulation  - lives at home with son and daughter-in-law  - lives in a house with stairs; 2 steps  - has only been using 1 crutch for ambulation as the other one broke  - difficulty with ambulation d/t age, chronic medical conditions, and significant arthritis.  - would like 4ww today.    HTN  - was started on 5mg lisinopril in April  - had large improvement in BP with this; larger than expected  - creatinine increased 27% at that time  - lisinopril was continued as this was a larger drop than expected, she was asymptomatic, and she has CKD4.  - has been taking it since then w/o any difficulty or side effects     CKD4  - nephrology referral placed at last visit, but patient has not yet set this up.  - on lisinopril  - bicarb fluctuating from 20-24; will not start bicarb at this time (recommended for bicarb <22).  - BP goal <140/90    Thyroid  - has had this since a teenager  - no work-up previous has been seen.  - TSH wnl in Feb 2018  - discussed further imaging options with patient today.    Depression  - PHQ-9 today of 15  - also with chronic all-over pain  - has not tried any medications in the past.    A [employer]  was used for this visit     ROS: Complete 6 point ROS completed and negative other than stated above.         Physical Exam:     Vitals:    06/01/18 1433   BP: 126/55   Pulse: 59   Resp: 16   Temp: 97.5  F (36.4  C)   TempSrc: Oral   SpO2: 98%     There is no height or weight on file to calculate BMI.  Vitals were reviewed and were normal    General: Alert and orientated in NAD. Pleasant and cooperative.   Cards: RRR, no murmurs, rubs or gallops. No lower extremity edema  Resp: clear vesicular breath sounds bilaterally, no crackles or wheezes. No increased work of  breathing  Psych: mood good, affect congruent        Results:     Results for orders placed or performed in visit on 06/01/18   Basic Metabolic Panel ([facility]) - Results < 1 hr   Result Value Ref Range    Glucose 170.0 (H) 60.0 - 109.0 mg/dL    Urea Nitrogen 47.0 (H) 7.0 - 30.0 mg/dL    Creatinine 2.3 (H) 0.6 - 1.3 mg/dL    Sodium 140.0 133.0 - 144.0 mmol/L    Potassium 3.9 3.4 - 5.3 mmol/L    Chloride 111.0 (H) 94.0 - 109.0 mmol/L    Carbon Dioxide 20.0 20.0 - 32.0 mmol/L    Calcium 8.6 8.5 - 10.4 mg/dL    eGFR Calculated (Non Black Reference) 21.6 (L) >60.0 mL/min    eGFR Calculated (Black Reference) 26.1 (L) >60.0 mL/min         Assessment and Plan     1. Essential hypertension  BP today well controlled. Creatinine improved from last visit (2.8-->2.3). Will continue on current lisinopril daily as she is tolerating it well w/o hypotension, and goal of <140/90 with CKD4.  - Basic Metabolic Panel ([facility]) - Results < 1 hr  - continue lisinopril 5mg daily  - repeat BMP in 3 months    2. Chronic kidney disease, stage 4 (severe) (H)  3. Primary osteoarthritis of both knees  - follow up on nephrology referral order  - continue lisinopril  - monitor bicarb for possible start replacement (650mg bid for goal of 27).  - order for DME; Equipment being ordered: Crutches  Dispense: 1 Units; Refill: 0  - order for DME; Equipment being ordered: 4 wheeled walker  Dispense: 1 Units; Refill: 0    4. Goiter  Discussed possible future follow up with patient. Likely benign given chronicity (since child-long). Patient does not want any further workup, which I agree with at this time.    5. Moderate episode of recurrent major depressive disorder (H)  Considered duloxetine with all over pain, but contraindicated with renal function. Considered TCA, but not likely with patient's age. Considered gabapentin, but difficult with both renal function and patient's age. Will discuss with patient in more detail at next visit. Possible side effect  vs living with the pain if this is tolerable.    Honoring Choices form discussed with her and given to her to fill out with her children.    Follow up in 1 month    Options for treatment and follow-up care were reviewed with the patient. Vignesh Fallon  engaged in the decision making process and verbalized understanding of the options discussed and agreed with the final plan.    Precepted with: MD Hope Morris MD (PGY2)  Pager: [phone]  [facility] Resident

## 2018-06-07 ASSESSMENT — PATIENT HEALTH QUESTIONNAIRE - PHQ9: SUM OF ALL RESPONSES TO PHQ QUESTIONS 1-9: 15

## 2018-06-08 ENCOUNTER — MEDICAL CORRESPONDENCE (OUTPATIENT)
Dept: HEALTH INFORMATION MANAGEMENT | Facility: CLINIC | Age: 83
End: 2018-06-08

## 2018-06-08 NOTE — PROGRESS NOTES
Preceptor Attestation:  Patient's case reviewed and discussed with  Patient seen and discussed with the resident..  I agree with written assessment and plan of care.  Supervising Physician:  Raquel Xiong MD  [facility]

## 2018-06-11 ENCOUNTER — TELEPHONE (OUTPATIENT)
Dept: FAMILY MEDICINE | Facility: CLINIC | Age: 83
End: 2018-06-11

## 2018-06-11 NOTE — TELEPHONE ENCOUNTER
Pt received a letter of denial for homecare services  Called her worker to determine the cause.    Pt had in the past two different pca hours one for pca and then another for extended needs services.  Together these = 4.5 hours per day  A new assessment of needs was just completed and her pca hours were assessed as regular pca hours of 6 hours per day, increasing services without defining it as two different PCA's - so one service ended but hours are actually increased.      Will route to PCP and see if this needs to be clarified with the patient.    CC Dr Hope Barthetz

## 2018-06-22 ENCOUNTER — TRANSFERRED RECORDS (OUTPATIENT)
Dept: HEALTH INFORMATION MANAGEMENT | Facility: CLINIC | Age: 83
End: 2018-06-22

## 2018-06-22 LAB
ALBUMIN (URINE): NORMAL MG/DL
ALBUMIN SERPL-MCNC: 3.6 G/DL (ref 3.6–5.1)
ALBUMIN SERPL-MCNC: 3.7 G/DL (ref 3.8–4.8)
ALBUMIN URINE: 54 DETECTED/NONDETECTED
ALPHA 1: 0.3 G/DL (ref 0.2–0.3)
ALPHA 1: 7 G/DL
ALPHA 2: 0.7 G/DL (ref 0.5–0.9)
ALPHA 2: 7 G/DL
ANION GAP SERPL CALCULATED.3IONS-SCNC: ABNORMAL MMOL/L
APPEARANCE UR: CLEAR
B-GLOBULIN SERPL ELPH-MCNC: 14 G/DL
BACTERIA, UR: NORMAL
BETA 1 GLOBULIN: 0.3 G/DL (ref 0.4–0.6)
BETA2 GLOB SERPL ELPH-MCNC: 0.3 G/DL (ref 0.2–0.5)
BILIRUB UR QL: NORMAL
BUN SERPL-MCNC: 42 MG/DL (ref 7–25)
BUN/CREATININE RATIO: 20 (ref 6–22)
CALCIUM SERPL-MCNC: 8.5 MG/DL (ref 8.6–10.4)
CASTS/LPF: NORMAL
CHLORIDE SERPLBLD-SCNC: 116 MMOL/L (ref 98–110)
CO2 SERPL-SCNC: 17 MMOL/L (ref 20–31)
COLOR UR: YELLOW
CREAT SERPL-MCNC: 2.11 MG/DL (ref 0.6–0.88)
CREATININE URINE: 116 (ref 20–320)
EGFR CALCULATED (BLACK REFERENCE): 24 ML/MIN
EGFR CALCULATED (NON BLACK REFERENCE): 21 ML/MIN
EP/HPF: NORMAL
FERRITIN SERPL-MCNC: 148 NG/ML (ref 20–288)
GAMMA GLOBULIN: 1.3 G/DL (ref 0.8–1.7)
GAMMA GLOBULIN: 18 G/DL
GLUCOSE SERPL-MCNC: 134 MG/DL (ref 65–99)
GLUCOSE URINE: NORMAL MG/DL
HGB UR QL: NORMAL
INTERPRETATION: 1.3 (ref 0.8–1.7)
INTERPRETATION: ABNORMAL
INTERPRETATION: NORMAL
INTERPRETATION: NORMAL
IRON BINDING CAPACITY: 212 UG/DL (ref 250–450)
IRON SATURATION %: 37 % (ref 11–50)
IRON: 78 UG/DL (ref 45–160)
KETONES UR QL: NORMAL MG/DL
LEUKOCYTE ESTERASE - QUEST: NORMAL
Lab: 132 G/DL (ref 5–24)
Lab: 6.6 G/DL (ref 6.1–8.1)
Lab: <4 NG/ML
MISC.: NORMAL
NITRITE UR QL STRIP: NORMAL
PARATHYROID HORMONE INTACT: 306 (ref 14–64)
PH UR STRIP: 6 PH (ref 5–7)
PHOSPHATE SERPL-MCNC: 3.4 MG/DL (ref 2.1–4.3)
POTASSIUM SERPL-SCNC: 5.1 MMOL/L (ref 3.5–5.3)
PROT SERPL-MCNC: 6.6 G/DL (ref 6.1–8.1)
PROTEIN/CREAT RATIO, RANDOM UR: 1138 (ref 21–161)
RBC, UR MICRO: NORMAL (ref ?–2)
SODIUM SERPL-SCNC: 140 MMOL/L (ref 135–146)
SP. GRAVITY: 1.02 (ref 1–1.03)
UROBILINOGEN UR QL STRIP: NORMAL EU/DL (ref 0.2–1)
VITAMIN D TOTAL: 10 NG/ML (ref 30–100)
WBC, UR MICRO: NORMAL (ref ?–2)

## 2018-06-25 LAB
CREAT SERPL-MCNC: 2.35 MG/DL (ref 0.6–0.88)
CREATININE CLEARANCE: 37 (ref 75–115)
GFR SERPL CREATININE-BSD FRML MDRD: 19 ML/MIN/1.73M2
Lab: 0.9 (ref 0.63–2.5)
Lab: 1.23
Lab: 10 IN
Lab: 4 FT
WEIGHT: 80 KG

## 2018-09-04 DIAGNOSIS — K21.9 GASTROESOPHAGEAL REFLUX DISEASE, ESOPHAGITIS PRESENCE NOT SPECIFIED: ICD-10-CM

## 2018-09-06 RX ORDER — FAMOTIDINE 10 MG
10 TABLET ORAL 2 TIMES DAILY
Qty: 120 TABLET | Refills: 1 | Status: SHIPPED | OUTPATIENT
Start: 2018-09-06 | End: 2018-12-18

## 2018-10-15 DIAGNOSIS — Z78.9 TAKES DIETARY SUPPLEMENTS: ICD-10-CM

## 2018-10-15 RX ORDER — LACTOSE-REDUCED FOOD
1 LIQUID (ML) ORAL 2 TIMES DAILY
Qty: 60 BOTTLE | Refills: 11 | Status: SHIPPED | OUTPATIENT
Start: 2018-10-15 | End: 2020-06-12

## 2018-10-15 NOTE — TELEPHONE ENCOUNTER
Message to physician:     Date of last visit: 6/29/2018     Date of next visit if scheduled: Visit date not found     Last Comprehensive Metabolic Panel:  Sodium   Date Value Ref Range Status   06/22/2018 140 135 - 146 mmol/L Final     Comment:     Records from [facility], [facility] [place] [address]     Potassium   Date Value Ref Range Status   06/22/2018 5.1 3.5 - 5.3 mmol/L Final     Chloride   Date Value Ref Range Status   06/22/2018 116 (A) 98 - 110 mmol/L Final     Carbon Dioxide   Date Value Ref Range Status   06/22/2018 17 (A) 20 - 31 mmol/L Final     Glucose   Date Value Ref Range Status   06/22/2018 134 (A) 65 - 99 mg/dL Final     Urea Nitrogen   Date Value Ref Range Status   06/22/2018 42 (A) 7 - 25 mg/dL Final     BUN/Creatinine Ratio   Date Value Ref Range Status   06/22/2018 20 6 - 22 Final     Creatinine   Date Value Ref Range Status   06/25/2018 2.35 (A) 0.60 - 0.88 mg/dL Final     Comment:     For patients >49 years of age, the reference limit for creatinine is approximately 13% higher for people identified as -American.     GFR Estimate   Date Value Ref Range Status   06/25/2018 19 >60 ml/min/1.73m2 Corrected     Comment:     Records from [facility], [address] [place] [address] [phone]     Calcium   Date Value Ref Range Status   06/22/2018 8.5 (A) 8.6 - 10.4 mg/dL Final       BP Readings from Last 3 Encounters:   06/01/18 126/55   05/04/18 94/56   04/06/18 172/70       Lab Results   Component Value Date    A1C 6.0 04/06/2018    A1C 6.8 08/17/2017    A1C 6.5 11/06/2015    A1C 6.8 06/02/2015    A1C 6.3 09/22/2014                Please complete refill and CLOSE ENCOUNTER.  Closing the encounter signifies the refill is complete.

## 2018-11-15 DIAGNOSIS — I10 ESSENTIAL HYPERTENSION: ICD-10-CM

## 2018-11-15 NOTE — TELEPHONE ENCOUNTER
Message to physician:     Date of last visit: 6/29/2018     Date of next visit if scheduled: Visit date not found      Last Comprehensive Metabolic Panel:  Sodium   Date Value Ref Range Status   06/22/2018 140 135 - 146 mmol/L Final     Comment:     Records from [facility], [facility] [place] [address]     Potassium   Date Value Ref Range Status   06/22/2018 5.1 3.5 - 5.3 mmol/L Final     Chloride   Date Value Ref Range Status   06/22/2018 116 (A) 98 - 110 mmol/L Final     Carbon Dioxide   Date Value Ref Range Status   06/22/2018 17 (A) 20 - 31 mmol/L Final     Glucose   Date Value Ref Range Status   06/22/2018 134 (A) 65 - 99 mg/dL Final     Urea Nitrogen   Date Value Ref Range Status   06/22/2018 42 (A) 7 - 25 mg/dL Final     BUN/Creatinine Ratio   Date Value Ref Range Status   06/22/2018 20 6 - 22 Final     Creatinine   Date Value Ref Range Status   06/25/2018 2.35 (A) 0.60 - 0.88 mg/dL Final     Comment:     For patients >49 years of age, the reference limit for creatinine is approximately 13% higher for people identified as -American.     GFR Estimate   Date Value Ref Range Status   06/25/2018 19 >60 ml/min/1.73m2 Corrected     Comment:     Records from [facility], [address] [place] [address] [phone]     Calcium   Date Value Ref Range Status   06/22/2018 8.5 (A) 8.6 - 10.4 mg/dL Final       BP Readings from Last 3 Encounters:   06/01/18 126/55   05/04/18 94/56   04/06/18 172/70       Lab Results   Component Value Date    A1C 6.0 04/06/2018    A1C 6.8 08/17/2017    A1C 6.5 11/06/2015    A1C 6.8 06/02/2015    A1C 6.3 09/22/2014                Please complete refill and CLOSE ENCOUNTER.  Closing the encounter signifies the refill is complete.

## 2018-11-16 RX ORDER — LISINOPRIL 5 MG/1
5 TABLET ORAL DAILY
Qty: 90 TABLET | Refills: 1 | Status: SHIPPED | OUTPATIENT
Start: 2018-11-16 | End: 2019-05-29

## 2018-11-27 ENCOUNTER — MEDICAL CORRESPONDENCE (OUTPATIENT)
Dept: HEALTH INFORMATION MANAGEMENT | Facility: CLINIC | Age: 83
End: 2018-11-27

## 2018-12-14 ENCOUNTER — TELEPHONE (OUTPATIENT)
Dept: FAMILY MEDICINE | Facility: CLINIC | Age: 83
End: 2018-12-14

## 2018-12-14 NOTE — TELEPHONE ENCOUNTER
[facility] phone call message- medication clarification/question:    Full Medication Name: Omeprazole 20mg capsule   Dose:     Question: Patient's son stated a refill for the medication listed above is needed. He stated the patient is out of this medication and is requesting a refill. Notified it may take 2 business days for a response. Please call and advise.     Pharmacy confirmed as [id number] - [place], [place] - [address]: Yes    OK to leave a message on voice mail? Yes    Primary language: Hmong      needed? Yes    Call taken on December 14, 2018 at 4:47 PM by Pricila Vargas

## 2018-12-17 ENCOUNTER — OFFICE VISIT (OUTPATIENT)
Dept: FAMILY MEDICINE | Facility: CLINIC | Age: 83
End: 2018-12-17
Payer: COMMERCIAL

## 2018-12-17 VITALS
SYSTOLIC BLOOD PRESSURE: 188 MMHG | WEIGHT: 102 LBS | BODY MASS INDEX: 26.52 KG/M2 | HEART RATE: 61 BPM | RESPIRATION RATE: 16 BRPM | DIASTOLIC BLOOD PRESSURE: 68 MMHG | TEMPERATURE: 97.9 F | OXYGEN SATURATION: 100 %

## 2018-12-17 DIAGNOSIS — I10 ESSENTIAL HYPERTENSION: ICD-10-CM

## 2018-12-17 DIAGNOSIS — K21.9 GASTROESOPHAGEAL REFLUX DISEASE, ESOPHAGITIS PRESENCE NOT SPECIFIED: Primary | ICD-10-CM

## 2018-12-17 DIAGNOSIS — M79.10 MYALGIA: ICD-10-CM

## 2018-12-17 RX ORDER — MULTIVITAMIN
1 TABLET ORAL
COMMUNITY
End: 2022-01-01

## 2018-12-17 NOTE — PATIENT INSTRUCTIONS
- Go home and start taking your medication including your blood pressure  - Stop omeprazole, start taking Famotidine  - Check your BP at home, ask your family to help you  - Continue with tylenol for body aches  - Follow up in 2 weeks for Diabetes and within 1 month for physical exam

## 2018-12-17 NOTE — PROGRESS NOTES
HPI:       Vignesh Fallon is a 83 year old  female with a significant past medical history of DMII, GERD, Hypertension, osteoarthritis, CKDIV, and latent tuberculosis who presents for the new concern(s) of    Medication Refills/GERD  - Would like refills on her omeprazole but was discontinued during her last clinic visit for no signs of esophagitis  - Was given new prescription for Famotidine but did not know about it. Has not picked up medication but has been taking omeprazole up until 2 days ago  - Denied any recent chest discomfort or issues with her GERD  - No food triggers    Body Aches  - Has been a chronic issues for a number of years which has been tolerable with tylenol and medications similar to tylenol which she gets from [place]  - Has been been getting better or worse but has been tolerable  - No acute concerns  - No associated fevers, chills, chest pain, shortness of breath, lightheadedness, dizziness, changes in bowel movement or urination    Hypertension  - Checks her blood pressure at home sometimes and most recently was several weeks ago and was 140-150 systolic  - Has been taking Lisinopril but has not taken any of her medication today due to fatigue and not remembering  - Denied any lightheadedness, vision or hearing changes, dizziness, presyncope or syncope episode    A [employer]  was used for this visit         PMHX:     Patient Active Problem List   Diagnosis     Vitamin D deficiency     Chronic kidney disease, stage 4 (severe) (H)     DM (diabetes mellitus), type 2 with renal complications (H)     Esophageal reflux     Goiter     Essential hypertension     Primary osteoarthritis of both knees     Latent tuberculosis by blood test     Anemia, unspecified type     Moderate episode of recurrent major depressive disorder (H)       Current Outpatient Medications   Medication Sig Dispense Refill     acetaminophen (TYLENOL) 500 MG tablet Take 2 tablets (1,000 mg) by mouth 3 times daily 200  tablet 11     lisinopril (PRINIVIL/ZESTRIL) 5 MG tablet Take 1 tablet (5 mg) by mouth daily 90 tablet 1     Nutritional Supplements (ENSURE NUTRITION SHAKE) LIQD Take 1 Bottle by mouth 2 times daily 60 Bottle 11     order for DME Equipment being ordered: Crutches 1 Units 0     famotidine (PEPCID) 10 MG tablet Take 1 tablet (10 mg) by mouth 2 times daily (Patient not taking: Reported on 12/17/2018) 120 tablet 1     multivitamin (ONE-DAILY) tablet Take 1 tablet by mouth       order for DME Equipment being ordered: 4 wheeled walker 1 Units 0       Social History     Socioeconomic History     Marital status:      Spouse name: Not on file     Number of children: Not on file     Years of education: Not on file     Highest education level: Not on file   Social Needs     Financial resource strain: Not on file     Food insecurity - worry: Not on file     Food insecurity - inability: Not on file     Transportation needs - medical: Not on file     Transportation needs - non-medical: Not on file   Occupational History     Not on file   Tobacco Use     Smoking status: Never Smoker     Smokeless tobacco: Never Used   Substance and Sexual Activity     Alcohol use: No     Drug use: No     Sexual activity: Not on file   Other Topics Concern     Not on file   Social History Narrative     Not on file       No family history on file.       Allergies   Allergen Reactions     No Known Allergies        No results found for this or any previous visit (from the past 24 hour(s)).         Review of Systems:   CONSTITUTIONAL: Generalized fatigue  E: NEGATIVE for acute vision problems or changes  E/M: NEGATIVE for ear, mouth and throat problems  R: NEGATIVE for significant cough or shortness of breath  CV: NEGATIVE for chest pain, palpitations or new or worsening peripheral edema  GI: NEGATIVE for new onset or worsening nausea, vomiting or diarrhea  : NEGATIVE for frequency, dysuria, or hematuria  MUSCULOSKELETAL: Body aches: see  HPI  N: NEGATIVE for weakness, dizziness, syncope, headaches  P: NEGATIVE for changes in mood or affect          Physical Exam:     Vitals:    12/17/18 1529 12/17/18 1535 12/17/18 1555   BP: 194/72 190/72 188/68   Pulse: 61     Resp: 16     Temp: 97.9  F (36.6  C)     TempSrc: Oral     SpO2: 100%     Weight: 46.3 kg (102 lb)       Body mass index is 26.52 kg/m .    GENERAL APPEARANCE: healthy, alert and no distress,  RESP: lungs clear to auscultation - no rales, rhonchi or wheezes  CV: regular rates and rhythm, normal S1 S2, no S3 or S4 and grade 2/6 late systolic murmur heard best over the apex  MS: extremities normal- no gross deformities noted  NEURO: Grossly normal  PSYCH: mood and affect normal/bright    Assessment and Plan     1. Gastroesophageal reflux disease, esophagitis presence not specified  Controlled. Recently discontinued omeprazole and prescribed famotidine.  -  famotidine and start taking it     2. Essential hypertension  Elevated above goal at today's visit. Did not take her medication today including her anti-hypertensive.  - Take antihypertension (lisinopril) when she gets home  - Check BP at home (ask son to assist)    3. Myalgia  Chronic condition that has not worsen. Tolerable with tylenol.  - Continue with tylenol as needed  - Follow up as needed    Patient to follow up in 2 weeks for diabetes and within in 1 month for complete physical exam.    Options for treatment and follow-up care were reviewed with the patient and/or guardian. Vignesh Fallon and/or guardian engaged in the decision making process and verbalized understanding of the options discussed and agreed with the final plan.    Nestor Freeman MD  [facility]  Pager: [phone]    Precepted today with: Nain Roth MD

## 2018-12-17 NOTE — NURSING NOTE
Due to patient being non-English speaking/uses sign language, an  was used for this visit. Only for face-to-face interpretation by an external agency, date and length of interpretation can be found on the scanned worksheet.     name: Cira Lorenzo  Agency: [employer]  Language: [place]   Telephone number: [phone]  Type of interpretation: Face-to-face, spoken

## 2018-12-17 NOTE — PROGRESS NOTES
Preceptor Attestation:     I have verified the content of the note, which accurately reflects my assessment of the patient and the plan of care.  Supervising Physician:Nain Roth MD  [facility]

## 2018-12-18 DIAGNOSIS — K21.9 GASTROESOPHAGEAL REFLUX DISEASE, ESOPHAGITIS PRESENCE NOT SPECIFIED: ICD-10-CM

## 2018-12-18 NOTE — TELEPHONE ENCOUNTER
[facility] phone call message- patient requesting a refill:    Full Medication Name: Famotidine    Dose: 10 mg    Pharmacy confirmed as   [id number] - [place], [place] - [id number] [place] [address]  Phone: [phone] Fax: [phone]  : Yes    Additional Comments:      OK to leave a message on voice mail? Yes    Primary language: Hmong      needed? Yes    Call taken on December 18, 2018 at 4:10 PM by Kelsey Rai

## 2018-12-19 RX ORDER — FAMOTIDINE 10 MG
10 TABLET ORAL 2 TIMES DAILY
Qty: 120 TABLET | Refills: 5 | Status: SHIPPED | OUTPATIENT
Start: 2018-12-19 | End: 2019-09-27

## 2019-01-18 ENCOUNTER — TRANSFERRED RECORDS (OUTPATIENT)
Dept: HEALTH INFORMATION MANAGEMENT | Facility: CLINIC | Age: 84
End: 2019-01-18

## 2019-04-19 ENCOUNTER — OFFICE VISIT (OUTPATIENT)
Dept: FAMILY MEDICINE | Facility: CLINIC | Age: 84
End: 2019-04-19

## 2019-04-19 VITALS
WEIGHT: 100.2 LBS | OXYGEN SATURATION: 97 % | DIASTOLIC BLOOD PRESSURE: 78 MMHG | BODY MASS INDEX: 23.19 KG/M2 | SYSTOLIC BLOOD PRESSURE: 167 MMHG | HEIGHT: 55 IN | TEMPERATURE: 97.7 F | HEART RATE: 69 BPM

## 2019-04-19 DIAGNOSIS — M17.0 PRIMARY OSTEOARTHRITIS OF BOTH KNEES: Primary | ICD-10-CM

## 2019-04-19 RX ORDER — TRIAMCINOLONE ACETONIDE 40 MG/ML
40 INJECTION, SUSPENSION INTRA-ARTICULAR; INTRAMUSCULAR ONCE
Status: COMPLETED | OUTPATIENT
Start: 2019-04-19 | End: 2019-04-19

## 2019-04-19 RX ORDER — ACETAMINOPHEN 500 MG
500-1000 TABLET ORAL 3 TIMES DAILY
Qty: 100 TABLET | Refills: 1 | Status: SHIPPED | OUTPATIENT
Start: 2019-04-19 | End: 2019-09-27

## 2019-04-19 RX ADMIN — TRIAMCINOLONE ACETONIDE 40 MG: 40 INJECTION, SUSPENSION INTRA-ARTICULAR; INTRAMUSCULAR at 13:29

## 2019-04-19 ASSESSMENT — MIFFLIN-ST. JEOR: SCORE: 716

## 2019-04-19 NOTE — NURSING NOTE
Due to patient being non-English speaking/uses sign language, an  was used for this visit. Only for face-to-face interpretation by an external agency, date and length of interpretation can be found on the scanned worksheet.     name: Bill Stokes  Agency: [employer]  Language: Hmong   Telephone number: [phone]  Type of interpretation: Face-to-face, spoken

## 2019-04-19 NOTE — PROGRESS NOTES
Preceptor Attestation:   Patient seen, evaluated and discussed with the resident. I have verified the content of the note, which accurately reflects my assessment of the patient and the plan of care.  Supervising Physician:Letty Reyes MD  [facility]      Preceptor Attestation:  I was present for and supervised the entire procedure. I have verified the content of the note, which accurately reflects my assessment of the patient and the plan of care.  Supervising Physician:Letty Reyes MD  [facility]

## 2019-04-19 NOTE — PROGRESS NOTES
"       HPI       Vignesh Fallon is a 83 year old female who presents for:  Chief Complaint   Patient presents with     Knee Pain     both knees, onset chronic for a long time      Medication Reconciliation     attemption doesnt know names of medications        Bilateral knee pain  - both knees, L>R  - has been present for several months but worsened in the last couple weeks  - no known fall or trauma  - ambulating at home with a walker, which is normal for her  - no fevers/chills, no redness or swelling  - she feels like it is broken and very painful to walk on it  - sitting is not painful, standing and walking is painful  - mainly located in the medial half of both knees.  - characterized as a sharp, shooting pain  - I personally reviewed knee xray from Nov 2017: genu varus deformity with mild-moderate medial compartment narrowing and subchondral sclerosis and cysts.  - patient has stage 4 CKD as well    A [email or web address]  was used for this visit     ROS: Complete 6 point ROS completed and negative other than stated above.    Social: Lives at home with her son and daughter-in-law         Physical Exam:     Vitals:    04/19/19 1138   BP: 167/78   Pulse: 69   Temp: 97.7  F (36.5  C)   TempSrc: Oral   SpO2: 97%   Weight: 45.5 kg (100 lb 3.2 oz)   Height: 1.34 m (4' 4.76\")     Body mass index is 25.31 kg/m .  Vital signs normal except for elevated BP    General: Alert and orientated in NAD. Pleasant and cooperative.   Cards: Extremities warm and well-perfused  Resp: No increased work of breathing  MSK: no erythema, edema, or effusion noted. Pain with palpation to bilateral medial joint line. Intact sensation and 5/5 strength.  Psych: mood good, affect congruent    Knee Injection Note  Vignesh Fallon is a patient of [facility]     Consent: Affirmation of informed consent was signed and scanned into the medical record. Risks, benefits and alternatives were discussed. Patient's questions were elicited and answered. "   Procedure safety checklist was completed:  Yes  Time Out (Pause for the Cause) completed: Yes    The left knee was prepped  in the usual fashion.    INJECTION:    Using 3 cc of 1% lidocaine  mixed with 40 mg of kenalog, the knee joint was successfully injected  without complication.  Patient experienced 50% relief of pain following injection.  Bandaid applied.  Routine care discussed.    Follow up: Pt was instructed to call if severe pain, redness, warmth or other concerns    Hope Tinsley    Preceptor:  Letty Reyes MD present for and supervised this procedure.    Assessment and Plan     1. Primary osteoarthritis of both knees  Patient opted for knee injection today of left knee; see details above. Patient tolerated well with 50% improvement in pain. Start with tylenol as well. Medical options limited d/t CKD4 and patient age.  - acetaminophen (TYLENOL) 500 MG tablet; Take 1-2 tablets (500-1,000 mg) by mouth 3 times daily  Dispense: 100 tablet; Refill: 1  - triamcinolone (KENALOG-40) injection 40 mg    Follow up in 1-2 weeks to consider injecting the right knee.    Options for treatment and follow-up care were reviewed with the patient. Vignesh Fallon  engaged in the decision making process and verbalized understanding of the options discussed and agreed with the final plan.    Precepted with: MD Hope Zacarias MD ([id number])  Pager: [phone]  [facility]

## 2019-05-06 ENCOUNTER — DOCUMENTATION ONLY (OUTPATIENT)
Dept: FAMILY MEDICINE | Facility: CLINIC | Age: 84
End: 2019-05-06

## 2019-05-10 ENCOUNTER — OFFICE VISIT (OUTPATIENT)
Dept: FAMILY MEDICINE | Facility: CLINIC | Age: 84
End: 2019-05-10

## 2019-05-10 VITALS
DIASTOLIC BLOOD PRESSURE: 73 MMHG | RESPIRATION RATE: 16 BRPM | TEMPERATURE: 98.1 F | SYSTOLIC BLOOD PRESSURE: 159 MMHG | OXYGEN SATURATION: 98 % | HEART RATE: 62 BPM

## 2019-05-10 DIAGNOSIS — M10.371 ACUTE GOUT DUE TO RENAL IMPAIRMENT INVOLVING RIGHT FOOT: Primary | ICD-10-CM

## 2019-05-10 DIAGNOSIS — G89.29 CHRONIC PAIN OF LEFT KNEE: ICD-10-CM

## 2019-05-10 DIAGNOSIS — M25.562 CHRONIC PAIN OF LEFT KNEE: ICD-10-CM

## 2019-05-10 DIAGNOSIS — M79.671 RIGHT FOOT PAIN: ICD-10-CM

## 2019-05-10 DIAGNOSIS — M17.0 PRIMARY OSTEOARTHRITIS OF BOTH KNEES: ICD-10-CM

## 2019-05-10 RX ORDER — PREDNISONE 20 MG/1
20 TABLET ORAL DAILY
Qty: 5 TABLET | Refills: 0 | Status: SHIPPED | OUTPATIENT
Start: 2019-05-10 | End: 2019-09-16

## 2019-05-10 NOTE — PATIENT INSTRUCTIONS
Your foot pain may be due gout. Take the prednisone 20mg daily for next 5 days. If you are still having pain, come back to the clinic as we may need to do this for longer.     Call the medical equipment company to see if you can exchange your walker. Let us know if you have trouble with this.

## 2019-05-10 NOTE — PROGRESS NOTES
Assessment and Plan       Vignesh Fallon is a 83 year old female with past medical hx including CKD4, OA who presented to clinic today for foot and knee pain    Acute gout due to renal impairment involving right foot  Suspect pain and edema of her right foot is related to gout.  No erythema to suggest cellulitis.  Consider possibility of Charcot foot but no evidence of fractures on x-ray, no other mechanism of injury to suggest a fracture.  Will empirically treat as a gout flare with prednisone, limited options given her underlying renal disease.  Consider checking uric acid, given flare, reasonable to wait after this subsides to evaluate.  Though limited treatment options with renal function.   - predniSONE (DELTASONE) 20 MG tablet  Dispense: 5 tablet; Refill: 0    Right foot pain  - XR FOOT RT G/E 3 VW    Chronic pain of left knee  Suspect OA as before given previous XR.   - XR Knee Left 1/2 Views    Primary osteoarthritis of both knees  X-ray with evidence of significant osteoarthritis of her left knee.  Discussed this with patient.  Recommended she try to increase how much Tylenol she can take a day.  Consider repeat joint injection in the future, however sounds as though the injection received with her last visit was completely ineffective.    Options for treatment and follow-up care were reviewed with the patient. Vignesh Fallon engaged in the decision making process and verbalized understanding of the options discussed and agreed with the final plan.    Benjamin Rosenstein, MD, MA  [facility]  P: [phone]    Precepted today with: Letty Reyes MD         HPI:       Chief Complaint   Patient presents with     RECHECK     F/U: bilateral feet pain - Still in pain     Medication Reconciliation     Needs attenttion        Vignesh Fallon is a 83 year old  female with pmhx of CKD4 and recent visit for left knee pain who presents for the following    Pain in left leg not any better after injection    Today  "having pain in right foot, more bothersome than knee  Has been present for at least 2 weeks  Mainly top middle foot  Denies feels like burning, unable to describe, just \"a lot of pain\"  No known injury - no dropped object, no twisting, no falls  Usually uses cane and crutches to get around, has been needing wheelchair with foot pain  Never had this before  Using bengay cream, not very helpful  Says tylenol helps a little bit - takes one a day. Says more makes her drowsy    A [employer]  was used for this visit         Review of Systems:     5 point ROS negative except as noted above in HPI, including Gen., Resp, CV, GI &  system review.             PFSH:   Problem List   Patient Active Problem List   Diagnosis     Vitamin D deficiency     Chronic kidney disease, stage 4 (severe) (H)     DM (diabetes mellitus), type 2 with renal complications (H)     Esophageal reflux     Goiter     Essential hypertension     Primary osteoarthritis of both knees     Latent tuberculosis by blood test     Anemia, unspecified type     Moderate episode of recurrent major depressive disorder (H)        Medications   Current Outpatient Medications   Medication Sig Dispense Refill     acetaminophen (TYLENOL) 500 MG tablet Take 1-2 tablets (500-1,000 mg) by mouth 3 times daily 100 tablet 1     famotidine (PEPCID) 10 MG tablet Take 1 tablet (10 mg) by mouth 2 times daily 120 tablet 5     lisinopril (PRINIVIL/ZESTRIL) 5 MG tablet Take 1 tablet (5 mg) by mouth daily 90 tablet 1     multivitamin (ONE-DAILY) tablet Take 1 tablet by mouth       Nutritional Supplements (ENSURE NUTRITION SHAKE) LIQD Take 1 Bottle by mouth 2 times daily 60 Bottle 11     order for DME Equipment being ordered: Crutches 1 Units 0     order for DME Equipment being ordered: 4 wheeled walker 1 Units 0        Social History      History   Smoking Status     Never Smoker   Smokeless Tobacco     Never Used             Allergies   Allergen Reactions     No Known " Allergies      Physical Exam        Physical Exam:     Vitals:    05/10/19 1124 05/10/19 1129   BP: 163/70 159/73   Pulse: 62    Resp: 16    Temp: 98.1  F (36.7  C)    TempSrc: Oral    SpO2: 98%      There is no height or weight on file to calculate BMI.    Vignesh is an older, frail appearing female, seated in wheelchair, in no acute distress.  She does have thyromegaly noticed on the right side of her neck.  Heart is a regular rate and rhythm, with normal S1/S2.  Her lungs are clear to auscultation bilaterally.      Her right foot was wrapped in a plastic bag to help keep the BenGay off her sock.  After removing this, the foot is notably edematous with pitting.  She has pain mainly on the medial dorsal aspect of her foot.  Less so on the plantar surface.  She denies significant pain with passive or active range of motion of her ankle.  She does not have significant pain with passive movement of her toes.  Her left foot appears normal without significant edema.    Her left knee shows a mild effusion. There is no redness or erythema over the knee. She has significant pain with movement of her knee.     Right foot XR personally reviewed  -Possible underlying foot arthritis, no evidence of occult fractures, no bony separations, no evidence of joint separations.    Left knee x-ray personally reviewed  -Mildly decreased joint space, particularly in the medial compartment.  Moderate spurring noted suggesting osteoarthritis. No fractures or avulsions.    There are no discontinued medications.    Patient Instructions   Patient Instructions   Your foot pain may be due gout. Take the prednisone 20mg daily for next 5 days. If you are still having pain, come back to the clinic as we may need to do this for longer.     Call the medical equipment company to see if you can exchange your walker. Let us know if you have trouble with this.           This note was completed with the assistance of dictation software. Typos and word  substitution-errors are expected and unintended.

## 2019-05-10 NOTE — NURSING NOTE
Due to patient being non-English speaking/uses sign language, an  was used for this visit. Only for face-to-face interpretation by an external agency, date and length of interpretation can be found on the scanned worksheet.     name: Bill Huffman  Agency: [employer]  Language: Hmong   Telephone number: [phone]  Type of interpretation: Face-to-face, spoken

## 2019-05-14 NOTE — RESULT ENCOUNTER NOTE
Radiologist read of XR knee and foot reviewed. No findings on radiology read to change current plan. Images reviewed with preceptor and discussed with patient at time of visit.

## 2019-05-15 NOTE — PROGRESS NOTES
"Visit to the client's home for annual health risk assessment and PCA assessment. DIL/RP, Shelbie, was also present. An  was not needed.    Current situation/living environment  Vignesh continues to live with her son (Ryland) and DIL/RP (Shelbie) in a single family home.    Activities of daily living (ADL)/instrumental activities of daily living (IADL) and functional issues  Client needs help with the following ADL's: dressing, grooming, bathing, eating, transfers, walking and toileting  Client needs help with the following IADL's: shopping, cooking, housekeeping, laundry, managing finances/bills and transportation  Client states she is unable to perform the above due to chronic back pain and pain in both knees as well as limited ROM of upper extremities.      Health concerns for today  Vignesh s primary health concern today is increased fatigue and knee pain. \"I'm so tired that all I ever do now is dream. To the point where I don't know if I am still dreaming or not.\" CC encouraged her to continue drinking 1 can of Ensure daily, eat healthy meals, and to discuss with PCP. Coby has received MH services in the past including Rx for Depression. She did not want to be associated with a mental health illness and declined to continue MH services and Rx. Today, coby again declines MH services and reports doing \"ok\" with [facility] services to help take her mind off of things. She continues to attend [facility] 1-2x/week.    As for pain, Vignesh reports it is chronic and rates as \"Up to 25 because it is past 10\". Received kenalog injection to left knee last month but states it did not improve pain. Takes OTC Tylenol PRN. Has also been experiencing pain on sole of right foot X 2 weeks. Pain makes standing and walking difficult. Gait remains unsteady as observed by CC. She continues to use very poor posture as demonstrated by Vignesh hunching approximately 45 degrees over her 4ww while ambulating. She remains dependent on 4ww to walk and continues to " be at risk for falls. Denies any recent falls. CC assisted in getting modular ramp installed to front door of home several months ago. Clt is able to enter/exit home with use of 4ww and SBA safely and no longer requires hands on assistance. She is very satisfied with the ramp.    RP reports increased poor short term memory with Vignesh. However, Vignesh states that she knows her meds and can remember to take. CC questioned if she is actually taking all meds as rx'd but she ensures that she is.   Has patient fallen 2 or more times in the last year? No  Has patient fallen with injury in the last year? No    Cognition/mental health  H/o Major Depression but denies the need to restart MH services. Denies any disorientation to person, place, or time. CC did observe clt become easily confused today but unsure if this was related to cognition or to decreased hearing. Vignesh also has h/o being verbally aggressive toward caregivers multiple times a day and will refuse caregiver s assistance on a daily basis. Family has been helping her manage with this for many years. They often will re-attempt cares at later times and just allow Vignesh to calm down.    STARS/Med Adherence  Client is non-compliant with the following quality measures: Diabetes care - eye exam, Diabetes care - kidney disease monitoring and Diabetes blood sugar testing  Comments: education efforts and CC to assist in scheduling an eye exam.    Client's Plan of Care consists of:  Adult day care (2 days per week)  Personal care assistance (PCA) (6.75 hours per day)  CC to schedule annual eye exam  Follow-up in 6 months or PRN    Brady Freeman RN N  [facility] Managed Care Coordinator  [phone]

## 2019-05-17 NOTE — PROGRESS NOTES
Preceptor Attestation:   Patient seen, evaluated and discussed with the resident. I personally reviewed the imaging and agree with the interpretation documented by the resident. I have verified the content of the note, which accurately reflects my assessment of the patient and the plan of care.  Supervising Physician:Letty Reyes MD  [facility]

## 2019-05-20 DIAGNOSIS — M10.9 ACUTE GOUTY ARTHRITIS: Primary | ICD-10-CM

## 2019-05-20 RX ORDER — PREDNISONE 20 MG/1
20 TABLET ORAL DAILY
Qty: 5 TABLET | Refills: 0 | Status: SHIPPED | OUTPATIENT
Start: 2019-05-20 | End: 2019-09-16

## 2019-05-29 DIAGNOSIS — I10 ESSENTIAL HYPERTENSION: ICD-10-CM

## 2019-05-30 RX ORDER — LISINOPRIL 5 MG/1
5 TABLET ORAL DAILY
Qty: 60 TABLET | Refills: 0 | Status: SHIPPED | OUTPATIENT
Start: 2019-05-30 | End: 2019-12-06

## 2019-08-26 ENCOUNTER — AMBULATORY - HEALTHEAST (OUTPATIENT)
Dept: ADMINISTRATIVE | Facility: REHABILITATION | Age: 84
End: 2019-08-26

## 2019-08-26 ENCOUNTER — OFFICE VISIT (OUTPATIENT)
Dept: FAMILY MEDICINE | Facility: CLINIC | Age: 84
End: 2019-08-26

## 2019-08-26 VITALS
TEMPERATURE: 98.1 F | HEART RATE: 60 BPM | RESPIRATION RATE: 16 BRPM | DIASTOLIC BLOOD PRESSURE: 77 MMHG | OXYGEN SATURATION: 97 % | BODY MASS INDEX: 24.5 KG/M2 | SYSTOLIC BLOOD PRESSURE: 165 MMHG | WEIGHT: 97 LBS

## 2019-08-26 DIAGNOSIS — E11.29 TYPE 2 DIABETES MELLITUS WITH OTHER DIABETIC KIDNEY COMPLICATION, WITHOUT LONG-TERM CURRENT USE OF INSULIN (H): ICD-10-CM

## 2019-08-26 DIAGNOSIS — I10 ESSENTIAL HYPERTENSION: ICD-10-CM
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
## 2019-08-26 DIAGNOSIS — M77.9 TENDONITIS: ICD-10-CM

## 2019-08-26 DIAGNOSIS — M10.9 ACUTE GOUTY ARTHRITIS: ICD-10-CM
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
## 2019-08-26 LAB
% GRANULOCYTES: 78.3 %G (ref 40–75)
BUN SERPL-MCNC: 41 MG/DL (ref 7–30)
CALCIUM SERPL-MCNC: 8.5 MG/DL (ref 8.5–10.4)
CHLORIDE SERPLBLD-SCNC: 110 MMOL/L (ref 94–109)
CO2 SERPL-SCNC: 19 MMOL/L (ref 20–32)
CREAT SERPL-MCNC: 2.2 MG/DL (ref 0.6–1.3)
EGFR CALCULATED (BLACK REFERENCE): 27.3 ML/MIN
EGFR CALCULATED (NON BLACK REFERENCE): 22.6 ML/MIN
GLUCOSE SERPL-MCNC: 94 MG/DL (ref 60–109)
GRANULOCYTES #: 6 K/UL (ref 1.6–8.3)
HBA1C MFR BLD: 7 % (ref 4.1–5.7)
HCT VFR BLD AUTO: 29.6 % (ref 35–47)
HEMOGLOBIN: 8.7 G/DL (ref 11.7–15.7)
LYMPHOCYTES # BLD AUTO: 1.2 K/UL (ref 0.8–5.3)
LYMPHOCYTES NFR BLD AUTO: 15.8 %L (ref 20–48)
MAGNESIUM SERPL-MCNC: 1.8 MG/DL (ref 1.8–2.6)
MCH RBC QN AUTO: 26.6 PG (ref 26.5–35)
MCHC RBC AUTO-ENTMCNC: 29.4 G/DL (ref 32–36)
MCV RBC AUTO: 90.6 FL (ref 78–100)
MID #: 0.5 K/UL (ref 0–2.2)
MID %: 5.9 %M (ref 0–20)
PLATELET # BLD AUTO: 270 K/UL (ref 150–450)
POTASSIUM SERPL-SCNC: 3.5 MMOL/L (ref 3.4–5.3)
RBC # BLD AUTO: 3.27 M/UL (ref 3.8–5.2)
SODIUM SERPL-SCNC: 142 MMOL/L (ref 133–144)
URATE SERPL-MCNC: 6.5 MG/DL (ref 2–7.5)
WBC # BLD AUTO: 7.7 K/UL (ref 4–11)

## 2019-08-26 RX ORDER — SENNOSIDES 8.6 MG
650 CAPSULE ORAL EVERY 8 HOURS PRN
Qty: 90 TABLET | Refills: 1 | Status: SHIPPED | OUTPATIENT
Start: 2019-08-26 | End: 2019-09-27

## 2019-08-26 NOTE — LETTER
August 30, 2019      Vignesh Fallon  [address] [place] [address]        Dear Vignesh,    Your kidney function looks improved.  The salts in your blood look at reasonable levels.  Your uric acid is low, which is good because it helps prevent gout attacks.   Your blood counts are stable.  Your A1C looks good for your age.    Please see below for your test results.    Resulted Orders   Uric Acid ([facility])   Result Value Ref Range    Uric Acid 6.5 2.0 - 7.5 mg/dL    Narrative    Test performed by:  [facility]  [address] [place] [address]   Basic Metabolic Panel ([facility]) - Results < 1 hr   Result Value Ref Range    Glucose 94.0 60.0 - 109.0 mg/dL    Urea Nitrogen 41.0 (H) 7.0 - 30.0 mg/dL    Creatinine 2.2 (H) 0.6 - 1.3 mg/dL    Sodium 142.0 133.0 - 144.0 mmol/L    Potassium 3.5 3.4 - 5.3 mmol/L    Chloride 110.0 (H) 94.0 - 109.0 mmol/L    Carbon Dioxide 19.0 (L) 20.0 - 32.0 mmol/L    Calcium 8.5 8.5 - 10.4 mg/dL    eGFR Calculated (Non Black Reference) 22.6 (L) >60.0 mL/min    eGFR Calculated (Black Reference) 27.3 (L) >60.0 mL/min   Magnesium ([facility])   Result Value Ref Range    Magnesium 1.8 1.8 - 2.6 mg/dL    Narrative    Test performed by:  [facility]  [address] [place] [address]   CBC with Diff Plt ([facility])   Result Value Ref Range    WBC 7.7 4.0 - 11.0 K/uL    Lymphocytes # 1.2 0.8 - 5.3 K/uL    % Lymphocytes 15.8 (L) 20.0 - 48.0 %L    Mid # 0.5 0.0 - 2.2 K/uL    Mid % 5.9 0.0 - 20.0 %M    GRANULOCYTES # 6.0 1.6 - 8.3 K/uL    % Granulocytes 78.3 (H) 40.0 - 75.0 %G    RBC 3.27 (L) 3.80 - 5.20 M/uL    Hemoglobin 8.7 (L) 11.7 - 15.7 g/dL    Hematocrit 29.6 (L) 35.0 - 47.0 %    MCV 90.6 78.0 - 100.0 fL    MCH 26.6 26.5 - 35.0 pg    MCHC 29.4 (L) 32.0 - 36.0 g/dL    Platelets 270.0 150.0 - 450.0 K/uL    Narrative    Result verified by repeat analysis   Hemoglobin A1c ([facility])   Result Value Ref Range    Hemoglobin A1C 7.0 (H) 4.1 - 5.7 %       If you have any questions, please  call the clinic to make an appointment.    Sincerely,    Dangelo Sam MD

## 2019-08-26 NOTE — NURSING NOTE
Due to patient being non-English speaking/uses sign language, an  was used for this visit. Only for face-to-face interpretation by an external agency, date and length of interpretation can be found on the scanned worksheet.     name: Callie Rodney  Agency: [employer]  Language: [place]   Telephone number: [phone]  Type of interpretation: Face-to-face, spoken     PHQ9--decline for today

## 2019-08-26 NOTE — PATIENT INSTRUCTIONS
Referral for :     Physical Therapy    LOCATION/PLACE/Provider :    [facility]  DATE & TIME :    Location to call, or grandson will call if they don't receive a phone call.   PHONE :     [phone]  FAX :    [phone]  Appointment made by clinic staff/:    Kelsey

## 2019-08-26 NOTE — PROGRESS NOTES
HPI:       Vignesh Fallon is a 84 year old  female with a significant past medical history of T2DM, HTN, MDD, CKD stage 4 who presents for the new concern(s) of    1) Right heel pain  -Onset: 3 months ago  -Location: medial aspect of right heel  -progressively worsening over time  -sharp pain that does not radiate  -Hurts to bear weight, better with keeping off it  -No injury to area    2) Left thigh pain  -Onset: 1 month ago  -tight pain that starts in left knee and radiates to hip  -worse with getting up and walking, lying of left side  -better with lying down  -No injury to back or left thigh      A [employer]  was used for this visit         PMHX:     Patient Active Problem List   Diagnosis     Vitamin D deficiency     Chronic kidney disease, stage 4 (severe) (H)     DM (diabetes mellitus), type 2 with renal complications (H)     Esophageal reflux     Goiter     Essential hypertension     Primary osteoarthritis of both knees     Latent tuberculosis by blood test     Anemia, unspecified type     Moderate episode of recurrent major depressive disorder (H)       History reviewed. No pertinent surgical history.    Current Outpatient Medications   Medication Sig Dispense Refill     acetaminophen (TYLENOL) 500 MG tablet Take 1-2 tablets (500-1,000 mg) by mouth 3 times daily 100 tablet 1     famotidine (PEPCID) 10 MG tablet Take 1 tablet (10 mg) by mouth 2 times daily 120 tablet 5     lisinopril (PRINIVIL/ZESTRIL) 5 MG tablet Take 1 tablet (5 mg) by mouth daily 60 tablet 0     multivitamin (ONE-DAILY) tablet Take 1 tablet by mouth       Nutritional Supplements (ENSURE NUTRITION SHAKE) LIQD Take 1 Bottle by mouth 2 times daily 60 Bottle 11     order for DME Equipment being ordered: Crutches 1 Units 0     order for DME Equipment being ordered: 4 wheeled walker 1 Units 0          Allergies   Allergen Reactions     No Known Allergies        Social History     Socioeconomic History     Marital status:       Spouse name: Not on file     Number of children: Not on file     Years of education: Not on file     Highest education level: Not on file   Occupational History     Not on file   Social Needs     Financial resource strain: Not on file     Food insecurity:     Worry: Not on file     Inability: Not on file     Transportation needs:     Medical: Not on file     Non-medical: Not on file   Tobacco Use     Smoking status: Never Smoker     Smokeless tobacco: Never Used   Substance and Sexual Activity     Alcohol use: No     Drug use: No     Sexual activity: Not on file   Lifestyle     Physical activity:     Days per week: Not on file     Minutes per session: Not on file     Stress: Not on file   Relationships     Social connections:     Talks on phone: Not on file     Gets together: Not on file     Attends Je[employer]ah's witness service: Not on file     Active member of club or organization: Not on file     Attends meetings of clubs or organizations: Not on file     Relationship status: Not on file     Intimate partner violence:     Fear of current or ex partner: Not on file     Emotionally abused: Not on file     Physically abused: Not on file     Forced sexual activity: Not on file   Other Topics Concern     Not on file   Social History Narrative     Not on file            Review of Systems:   10-point ROS reviewed and negative unless otherwise noted in HPI          Physical Exam:     Vitals:    08/26/19 0940   BP: (!) 165/77   Pulse: 60   Resp: 16   Temp: 98.1  F (36.7  C)   TempSrc: Oral   SpO2: 97%   Weight: 44 kg (97 lb)     Body mass index is 24.5 kg/m .    GENERAL: healthy, alert and no distress  EYES: Eyes grossly normal to inspection,  PERRL  RESP: lungs clear to auscultation - no rales, rhonchi or wheezes  CV: regular rate and rhythm,  and no murmur, click,  rub or gallop  MS: Grossly normal gait.  no varicosities, normal muscle tone. Active and passive ROM of ankles, knees, hips intact bilaterally.  No pain with passive ROM  of ankles, knees, and hips. Pain elicited with right ankle inversion and left knee flexion against resistance.  No tenderness to palpation of lateral left hip, left patella, left knee joint line, left posterior knee, right lateral ankle, right anterior ankle, right achilles tendon, right plantar surface.  Tenderness on right medial ankle.    SKIN: no suspicious lesions or rashes  NEURO: A&Ox3.  Gross motor function and light touch sensation intact in bilateral extremities.  Patellar reflexes 1+ bilaterally.        Assessment and Plan     (T14.8XXA) Overuse injury  (primary encounter diagnosis)  Comment: 1-month history of left hip and thigh pain with ambulation.  Pertinent history of right ankle pain.  VS show mildly hypertensive.  Exam as above.  This is likely an overuse injury and/or left-sided SI joint dysfunction from the right ankle tendonitis.  Explained disease course and treatment to patient.  Conservative treatment agreed upon by patient.  Plan:   -diclofenac (VOLTAREN) 1 % topical gel, 2g to skin Q6H to right heel, left hip, and left knee  acetaminophen (TYLENOL) 650 MG CR tablet, Q8H PRN pain  -PHYSICALTHERAPY REFERRAL    (M77.9) Tendonitis  Comment: 3-month history of right medial heel pain with ambulation.  VS show mildly hypertensive.  Exam as above.  This is likely medial ankle compartment tendonitis.  Explained disease course and treatment to patient.  Conservative treatment agreed upon by patient.  Plan:   -diclofenac (VOLTAREN) 1 % topical gel, 2g to skin Q6H to right heel, left hip, and left knee  acetaminophen (TYLENOL) 650 MG CR tablet, Q8H PRN pain  -PHYSICALTHERAPY REFERRAL    (E11.29) Type 2 diabetes mellitus with other diabetic kidney complication, without long-term current use of insulin (H)  Comment: Currently diet controlled.  Patient due for BMP, microalbumin urine, CBC, and A1C.  A1C goal likely no less than 7.5% considering age.  Plan:   -CBC, BMP, Mg, urine microalbumin,  A1C  -Continue with diet control    (M10.9) Acute gouty arthritis  Comment: Stable.  Currently on APAP only.  Due for uric acid check..  Plan:   -Uric Acid ([facility])    (I10) Essential hypertension  Comment: Mildly elevated BP.  Currently on lisinopril 5mg daily.  Plan:   -Continue with lisinopril 5mg daily    RTC: 4-6 wk follow up on MSK problems after PT      Options for treatment and follow-up care were reviewed with the patient and/or guardian. Vignesh Fallon and/or guardian engaged in the decision making process and verbalized understanding of the options discussed and agreed with the final plan.      Dangelo Sam MD    Precepted today with: Fritz Birmingham MD

## 2019-08-30 NOTE — RESULT ENCOUNTER NOTE
"Please call with results. Needs [facility] .  Please let her know,    \"Your kidney function looks improved.  The salts in your blood look at reasonable levels.  Your uric acid is low, which is good because it helps prevent gout attacks.   Your blood counts are stable.  Your A1C looks good for your age.\"    Dangelo Sam MD"

## 2019-09-04 ENCOUNTER — TRANSFERRED RECORDS (OUTPATIENT)
Dept: HEALTH INFORMATION MANAGEMENT | Facility: CLINIC | Age: 84
End: 2019-09-04

## 2019-09-04 NOTE — PROGRESS NOTES
Preceptor Attestation:   Patient seen, evaluated and discussed with the resident. I have verified the content of the note, which accurately reflects my assessment of the patient and the plan of care.    Supervising Physician:Fritz Birmingham MD    [facility]

## 2019-09-09 ENCOUNTER — TRANSFERRED RECORDS (OUTPATIENT)
Dept: HEALTH INFORMATION MANAGEMENT | Facility: CLINIC | Age: 84
End: 2019-09-09

## 2019-09-11 ENCOUNTER — TELEPHONE (OUTPATIENT)
Dept: FAMILY MEDICINE | Facility: CLINIC | Age: 84
End: 2019-09-11

## 2019-09-11 NOTE — TELEPHONE ENCOUNTER
Called patient using language line, notified patient of provider's message. Patient states she will discuss with her children first to see when they're able to give a ride. Patient states to call back on 9/12/19 in the AM and to try calling a couple times if she doesn't answer. Notified patient I will return call tomorrow morning.

## 2019-09-16 ENCOUNTER — OFFICE VISIT (OUTPATIENT)
Dept: FAMILY MEDICINE | Facility: CLINIC | Age: 84
End: 2019-09-16

## 2019-09-16 ENCOUNTER — RECORDS - HEALTHEAST (OUTPATIENT)
Dept: ADMINISTRATIVE | Facility: OTHER | Age: 84
End: 2019-09-16

## 2019-09-16 VITALS
BODY MASS INDEX: 22.72 KG/M2 | SYSTOLIC BLOOD PRESSURE: 206 MMHG | OXYGEN SATURATION: 96 % | HEIGHT: 55 IN | HEART RATE: 63 BPM | RESPIRATION RATE: 18 BRPM | WEIGHT: 98.2 LBS | DIASTOLIC BLOOD PRESSURE: 84 MMHG | TEMPERATURE: 98.1 F

## 2019-09-16 DIAGNOSIS — R91.8 PULMONARY NODULES: Primary | ICD-10-CM

## 2019-09-16 DIAGNOSIS — I10 ESSENTIAL HYPERTENSION: ICD-10-CM

## 2019-09-16 ASSESSMENT — PATIENT HEALTH QUESTIONNAIRE - PHQ9: SUM OF ALL RESPONSES TO PHQ QUESTIONS 1-9: 9

## 2019-09-16 ASSESSMENT — MIFFLIN-ST. JEOR: SCORE: 689.93

## 2019-09-16 NOTE — NURSING NOTE
Due to patient being non-English speaking/uses sign language, an  was used for this visit. Only for face-to-face interpretation by an external agency, date and length of interpretation can be found on the scanned worksheet.     name: Bill Stokes  Agency: [employer]  Language: [place]   Telephone number: [phone]  Type of interpretation: Face-to-face, spoken

## 2019-09-16 NOTE — PROGRESS NOTES
HPI:       Vignesh Fallon is a 84 year old  female who presents for the new concern(s) of    Lung nodule:  -Seen on recent x-ray. Measured at 2 cm in right lower lobe  -Recommendation is to have dedicated CT, can be without contrast, to further follow  -Had CXR because of latent TB by blood test    HTN:  -Very high BP here today at 206/84. Recheck was 203/69  -Denies headache, blurry vision, CP, SOB  -Does have BP cuff at home. Takes her BP at home, and thinks systolic ranges   -Has not run out of BP medication at home, and she states that she has not been taking lisinopril for last 3 days.    A [employer]  was used for this visit         PMHX:     Patient Active Problem List   Diagnosis     Vitamin D deficiency     Chronic kidney disease, stage 4 (severe) (H)     DM (diabetes mellitus), type 2 with renal complications (H)     Esophageal reflux     Goiter     Essential hypertension     Primary osteoarthritis of both knees     Latent tuberculosis by blood test     Anemia, unspecified type     Moderate episode of recurrent major depressive disorder (H)     Pulmonary nodules       Current Outpatient Medications   Medication Sig Dispense Refill     acetaminophen (TYLENOL) 500 MG tablet Take 1-2 tablets (500-1,000 mg) by mouth 3 times daily 100 tablet 1     acetaminophen (TYLENOL) 650 MG CR tablet Take 1 tablet (650 mg) by mouth every 8 hours as needed for mild pain or fever 90 tablet 1     diclofenac (VOLTAREN) 1 % topical gel Place 2 g onto the skin 4 times daily To your right heel and left knee and hip 100 g 1     famotidine (PEPCID) 10 MG tablet Take 1 tablet (10 mg) by mouth 2 times daily 120 tablet 5     lisinopril (PRINIVIL/ZESTRIL) 5 MG tablet Take 1 tablet (5 mg) by mouth daily 60 tablet 0     multivitamin (ONE-DAILY) tablet Take 1 tablet by mouth       Nutritional Supplements (ENSURE NUTRITION SHAKE) LIQD Take 1 Bottle by mouth 2 times daily 60 Bottle 11     order for DME Equipment being ordered:  "Crutches 1 Units 0     order for DME Equipment being ordered: 4 wheeled walker 1 Units 0       Social History     Socioeconomic History     Marital status:      Spouse name: Not on file     Number of children: Not on file     Years of education: Not on file     Highest education level: Not on file   Occupational History     Not on file   Social Needs     Financial resource strain: Not on file     Food insecurity:     Worry: Not on file     Inability: Not on file     Transportation needs:     Medical: Not on file     Non-medical: Not on file   Tobacco Use     Smoking status: Never Smoker     Smokeless tobacco: Never Used   Substance and Sexual Activity     Alcohol use: No     Drug use: No     Sexual activity: Not on file   Lifestyle     Physical activity:     Days per week: Not on file     Minutes per session: Not on file     Stress: Not on file   Relationships     Social connections:     Talks on phone: Not on file     Gets together: Not on file     Attends [employer] service: Not on file     Active member of club or organization: Not on file     Attends meetings of clubs or organizations: Not on file     Relationship status: Not on file     Intimate partner violence:     Fear of current or ex partner: Not on file     Emotionally abused: Not on file     Physically abused: Not on file     Forced sexual activity: Not on file   Other Topics Concern     Not on file   Social History Narrative     Not on file          Allergies   Allergen Reactions     No Known Allergies        No results found for this or any previous visit (from the past 24 hour(s)).         Review of Systems:     A 6 point review of systems is negative unless stated in HPI          Physical Exam:     Vitals:    09/16/19 1130 09/16/19 1138   BP: (!) 203/69 (!) 206/84   Pulse: 63    Resp: 18    Temp: 98.1  F (36.7  C)    SpO2: 96%    Weight: 44.5 kg (98 lb 3.2 oz)    Height: 1.321 m (4' 4\")      Body mass index is 25.53 kg/m .    GENERAL " APPEARANCE: healthy, alert and no distress  NECK: Symmetrical fullness to neck BL (patient states this is congenital), no adenopathy, no asymmetry, masses, or scars and thyroid normal to palpation  RESP: lungs clear to auscultation - no rales, rhonchi or wheezes  CV: regular rate and rhythm,  and no murmur, click,  rub or gallop  MS: extremities normal- no gross deformities noted  NEURO: Normal strength and tone, sensory exam grossly normal, mentation appears intact and speech normal  PSYCH: mood and affect normal/bright      Assessment and Plan     1. Pulmonary nodules  2 cm nodule in right lower lobe seen on CXR screening for TB. Recommended dedicated CT. Had extensive risk/benefit discussion with patient about the need to further qualify the type of lesion present. Patient expresses worry in the possibility that this could be cancer, but says that her body aches too much to go from appointment to appointment. Finally agreed that it would be best to at least put the order and and schedule for eventual CT.  - CT Chest w/o Contrast; Future    2. Essential hypertension  Quite high here today, as above. Patient not taking BP med. Reviewed old BPs, and seems to be more controlled when on lisinopril. Stressed importance of BP control, and patient will start taking again.       Options for treatment and follow-up care were reviewed with the patient and/or guardian. Vignesh Fallon and/or guardian engaged in the decision making process and verbalized understanding of the options discussed and agreed with the final plan.    Jose M Watson MD  [facility] Resident, PGY2      Precepted today with: Bam Singer MD

## 2019-09-16 NOTE — PATIENT INSTRUCTIONS
Referral for :   CT Chest w/o contrast      LOCATION/PLACE/Provider :    [facility]   DATE & TIME :    Location will call patient  PHONE :     [phone]  FAX :    [phone]  Appointment made by clinic staff/:    Kelsey

## 2019-09-18 ENCOUNTER — TELEPHONE (OUTPATIENT)
Dept: FAMILY MEDICINE | Facility: CLINIC | Age: 84
End: 2019-09-18

## 2019-09-18 NOTE — TELEPHONE ENCOUNTER
[facility] phone call message- general phone call:    Reason for call: Calling to see if the doctor received the abnormal chest xray from them from last week? Would need to get a follow up on the Patient as she would need a follow up chest xray CT and what the status is? Please call and advise.      Return call needed: Yes    OK to leave a message on voice mail?     Primary language: [facility]      needed? Yes    Call taken on September 18, 2019 at 3:03 PM by Rafiq Scott

## 2019-09-18 NOTE — TELEPHONE ENCOUNTER
Called [facility] to discuss, no answer. Left VM on confidential VM of patient already seen in clinic, CT ordered. HE to call patient to schedule. Requested call to clinic with further questions/concerns. Edda XIONG

## 2019-09-27 ENCOUNTER — OFFICE VISIT (OUTPATIENT)
Dept: FAMILY MEDICINE | Facility: CLINIC | Age: 84
End: 2019-09-27

## 2019-09-27 VITALS
BODY MASS INDEX: 24.7 KG/M2 | TEMPERATURE: 98.4 F | DIASTOLIC BLOOD PRESSURE: 79 MMHG | RESPIRATION RATE: 18 BRPM | WEIGHT: 95 LBS | SYSTOLIC BLOOD PRESSURE: 134 MMHG | OXYGEN SATURATION: 99 % | HEART RATE: 73 BPM

## 2019-09-27 DIAGNOSIS — K21.9 GASTROESOPHAGEAL REFLUX DISEASE, ESOPHAGITIS PRESENCE NOT SPECIFIED: Primary | ICD-10-CM

## 2019-09-27 DIAGNOSIS — A08.4 VIRAL GASTROENTERITIS: ICD-10-CM

## 2019-09-27 DIAGNOSIS — E11.29 TYPE 2 DIABETES MELLITUS WITH OTHER DIABETIC KIDNEY COMPLICATION, WITHOUT LONG-TERM CURRENT USE OF INSULIN (H): ICD-10-CM

## 2019-09-27 DIAGNOSIS — I10 ESSENTIAL HYPERTENSION: ICD-10-CM

## 2019-09-27 LAB
BUN SERPL-MCNC: 32 MG/DL (ref 7–30)
CALCIUM SERPL-MCNC: 8.7 MG/DL (ref 8.5–10.4)
CHLORIDE SERPLBLD-SCNC: 100 MMOL/L (ref 94–109)
CO2 SERPL-SCNC: 22 MMOL/L (ref 20–32)
CREAT SERPL-MCNC: 1.9 MG/DL (ref 0.6–1.3)
EGFR CALCULATED (BLACK REFERENCE): 32.4 ML/MIN
EGFR CALCULATED (NON BLACK REFERENCE): 26.8 ML/MIN
GLUCOSE SERPL-MCNC: 141 MG/DL (ref 60–109)
POTASSIUM SERPL-SCNC: 4.1 MMOL/L (ref 3.4–5.3)
SODIUM SERPL-SCNC: 136 MMOL/L (ref 133–144)

## 2019-09-27 RX ORDER — FAMOTIDINE 10 MG
10 TABLET ORAL 2 TIMES DAILY
Qty: 120 TABLET | Refills: 5 | Status: SHIPPED | OUTPATIENT
Start: 2019-09-27 | End: 2020-07-23

## 2019-09-27 RX ORDER — SENNOSIDES 8.6 MG
650 CAPSULE ORAL EVERY 8 HOURS PRN
Qty: 90 TABLET | Refills: 1 | Status: SHIPPED | OUTPATIENT
Start: 2019-09-27 | End: 2022-01-01

## 2019-09-27 NOTE — NURSING NOTE
Due to patient being non-English speaking/uses sign language, an  was used for this visit. Only for face-to-face interpretation by an external agency, date and length of interpretation can be found on the scanned worksheet.     name: Va  Agency: [employer]  Language: [place]   Telephone number: [phone]  Type of interpretation: Face-to-face, spoken

## 2019-09-27 NOTE — PROGRESS NOTES
Assessment and Plan     1. Gastroesophageal reflux disease, esophagitis presence not specified  Likely exacerbated by viral gastroenteritis as below - will trial PPI again. She stopped Pepcid at unknown time. Discussed twice daily dosing x14d and then trial off.   - acetaminophen (TYLENOL) 650 MG CR tablet; Take 1 tablet (650 mg) by mouth every 8 hours as needed for mild pain or fever  Dispense: 90 tablet; Refill: 1  - famotidine (PEPCID) 10 MG tablet; Take 1 tablet (10 mg) by mouth 2 times daily  Dispense: 120 tablet; Refill: 5    2. Essential hypertension  Well controlled today, no changes.  BP Readings from Last 3 Encounters:   09/27/19 134/79   09/16/19 (!) 206/84   08/26/19 (!) 165/77   - Basic Metabolic Panel ([facility]) - Results < 1 hr    3. Viral gastroenteritis  Improved - no vomiting x2d and nausea improved. Seems well hydrated. No blood with emesis so no concern for tear. Discussed continued hydration and reassurance provided.   - Basic Metabolic Panel ([facility]) - Results < 1 hr  - acetaminophen (TYLENOL) 650 MG CR tablet; Take 1 tablet (650 mg) by mouth every 8 hours as needed for mild pain or fever  Dispense: 90 tablet; Refill: 1    Follow up for recheck on DM and BP in 3 months  Options for treatment and follow-up care were reviewed with the patient and/or guardian. Vignesh Fallon and/or guardian engaged in the decision making process and verbalized understanding of the options discussed and agreed with the final plan.    Aditya Mancuso MD   [facility]  Pager #: [phone]    Precepted today with: Dr. Roth           HPI:       Vignesh Fallon is a 84 year old female who presents for Nausea and medication refill.     Nausea started about 2 weeks ago  Seems to be vomiting every morning  Thinks it is getting a little better - has not thrown up past 2 days  No blood in vomit  No diarrhea, no blood in stools  Has been going regularly   No fevers  Is staying hydrated    BP:   Not  checking her blood pressure at home  Last clinic visit was extremely high - only on 5mg lisinopril            PMHX:     Patient Active Problem List   Diagnosis     Vitamin D deficiency     Chronic kidney disease, stage 4 (severe) (H)     DM (diabetes mellitus), type 2 with renal complications (H)     Esophageal reflux     Goiter     Essential hypertension     Primary osteoarthritis of both knees     Latent tuberculosis by blood test     Anemia, unspecified type     Moderate episode of recurrent major depressive disorder (H)     Pulmonary nodules       Current Outpatient Medications   Medication Sig Dispense Refill     lisinopril (PRINIVIL/ZESTRIL) 5 MG tablet Take 1 tablet (5 mg) by mouth daily 60 tablet 0     Nutritional Supplements (ENSURE NUTRITION SHAKE) LIQD Take 1 Bottle by mouth 2 times daily 60 Bottle 11     order for DME Equipment being ordered: 4 wheeled walker 1 Units 0     diclofenac (VOLTAREN) 1 % topical gel Place 2 g onto the skin 4 times daily To your right heel and left knee and hip (Patient not taking: Reported on 9/27/2019) 100 g 1     famotidine (PEPCID) 10 MG tablet Take 1 tablet (10 mg) by mouth 2 times daily (Patient not taking: Reported on 9/27/2019) 120 tablet 5     multivitamin (ONE-DAILY) tablet Take 1 tablet by mouth       order for DME Equipment being ordered: Crutches (Patient not taking: Reported on 9/27/2019) 1 Units 0       Social History     Tobacco Use     Smoking status: Never Smoker     Smokeless tobacco: Never Used   Substance Use Topics     Alcohol use: No     Drug use: No          Allergies   Allergen Reactions     No Known Allergies        No results found for this or any previous visit (from the past 24 hour(s)).         Review of Systems:   C: POSITIVE for fatigue, no unexpected change in weight  E: NEGATIVE for acute vision problems or changes  R: NEGATIVE for significant cough or shortness of breath  CV: NEGATIVE for chest pain, palpitations or new or worsening peripheral  edema  P: NEGATIVE for changes in mood or affect     Complete ROS negative unless noted above or in HPI       Physical Exam:     Vitals:    09/27/19 1530   BP: 134/79   Pulse: 73   Resp: 18   Temp: 98.4  F (36.9  C)   TempSrc: Oral   SpO2: 99%   Weight: 43.1 kg (95 lb)     Body mass index is 24.7 kg/m .    GENERAL APPEARANCE: alert and no acute distress, unhealthy elderly appearing female  PSYCH: mentation appears normal and affect normal/bright  RESP: lungs clear to auscultation - no rales, rhonchi or wheezes  CV: systolic murmur heard throughout  EXT: no cyanosis or edema in lower extremities  SKIN: no venous stasis changes

## 2019-09-27 NOTE — PROGRESS NOTES
Preceptor Attestation:   Patient seen, evaluated and discussed with the resident. I have verified the content of the note, which accurately reflects my assessment of the patient and the plan of care.  Supervising Physician:Nain Roth MD  [facility]

## 2019-10-08 NOTE — RESULT ENCOUNTER NOTE
Electrolytes normal despite gastro issues, kidney function improved from 1 month ago. Follow up at next DM check.   FREDERIC

## 2019-10-14 NOTE — PROGRESS NOTES
I have personally reviewed the history and examination as documented by Dr. Watson.  I was present during key portions of the visit and agree with the assessment and plan as documented for 84 yr old female with uncontrolled HTN, pulmonary nodule here for follow-up. Emphasized importance of med compliance. Dedicated CT ordered for lungs. Precautions given. Anticipatory guidance given.     Bam Singer MD  October 13, 2019  9:28 PM

## 2019-10-16 ENCOUNTER — TELEPHONE (OUTPATIENT)
Dept: FAMILY MEDICINE | Facility: CLINIC | Age: 84
End: 2019-10-16

## 2019-10-16 NOTE — TELEPHONE ENCOUNTER
Grandson called to schedule patient an appointment. Appointmnt was scheduled for 10/18/19 with pcp. Caller was not with the patient so I was unable to transfer to triage for symptoms.

## 2019-10-18 ENCOUNTER — OFFICE VISIT (OUTPATIENT)
Dept: FAMILY MEDICINE | Facility: CLINIC | Age: 84
End: 2019-10-18

## 2019-10-18 VITALS
SYSTOLIC BLOOD PRESSURE: 120 MMHG | BODY MASS INDEX: 24.44 KG/M2 | DIASTOLIC BLOOD PRESSURE: 58 MMHG | WEIGHT: 94 LBS | RESPIRATION RATE: 16 BRPM | HEART RATE: 72 BPM | TEMPERATURE: 97.7 F

## 2019-10-18 DIAGNOSIS — M94.0 COSTOCHONDRITIS: Primary | ICD-10-CM

## 2019-10-18 NOTE — PATIENT INSTRUCTIONS
Thank you for coming in to be seen.     Please take Tylenol three times per day. You can use a heating pad to help with the pain.     Let's have you follow up in 1 week.

## 2019-10-18 NOTE — PROGRESS NOTES
Subjective:   Vignesh Fallon is a 84 year old year old female who presents to clinic today for the following health issues:    CHEST PAIN     Onset: 2 weeks ago    Description:   Location:  left side ribs  Character: stabbing  Radiation: pain started in lower ribs and moved up chest  Duration: constant    Intensity: before was 10/10 and now 7-8/10     Progression of Symptoms:  Better than before    Accompanying Signs & Symptoms:  Shortness of breath: no unless when dry heaving  Sweating: no   Nausea/vomiting: YES nausea, throw up but dry  Lightheadedness: YES  Palpitations: no  Fever/Chills: no   Cough: no   Heartburn: no     History:   Family history of heart disease no   Tobacco use: no     Precipitating factors:   Worse with exertion: little bit if exerting a lot  Worse with deep breaths :  no   Related to food: no     Alleviating factors:  Using shoe shining oil that is helping (menthol)  Taking tylenol 1x.day         Bilateral leg pain for a 10 years. Feel very tired and feels like her eyes are tight. Sleeps soundly through the whole night.     A [employer]  was used for this visit    PMHX:     Patient Active Problem List   Diagnosis     Vitamin D deficiency     Chronic kidney disease, stage 4 (severe) (H)     DM (diabetes mellitus), type 2 with renal complications (H)     Esophageal reflux     Goiter     Essential hypertension     Primary osteoarthritis of both knees     Latent tuberculosis by blood test     Anemia, unspecified type     Moderate episode of recurrent major depressive disorder (H)     Pulmonary nodules     Current Outpatient Medications   Medication Sig Dispense Refill     acetaminophen (TYLENOL) 650 MG CR tablet Take 1 tablet (650 mg) by mouth every 8 hours as needed for mild pain or fever 90 tablet 1     blood glucose monitoring (NO BRAND SPECIFIED) meter device kit Use to test blood sugar up to 3 times daily or as directed. 1 kit 0     famotidine (PEPCID) 10 MG tablet Take 1 tablet (10 mg)  by mouth 2 times daily 120 tablet 5     lisinopril (PRINIVIL/ZESTRIL) 5 MG tablet Take 1 tablet (5 mg) by mouth daily 60 tablet 0     multivitamin (ONE-DAILY) tablet Take 1 tablet by mouth       Nutritional Supplements (ENSURE NUTRITION SHAKE) LIQD Take 1 Bottle by mouth 2 times daily 60 Bottle 11     order for DME Equipment being ordered: Crutches (Patient not taking: Reported on 9/27/2019) 1 Units 0     order for DME Equipment being ordered: 4 wheeled walker 1 Units 0     Social History     Tobacco Use     Smoking status: Never Smoker     Smokeless tobacco: Never Used   Substance Use Topics     Alcohol use: No     Drug use: No      Allergies   Allergen Reactions     No Known Allergies      Review of Systems:     Constitutional, HEENT, cardiovascular, pulmonary, GI systems are negative, except as otherwise noted in HPI     Objective:     /58   Pulse 72   Temp 97.7  F (36.5  C) (Oral)   Resp 16   Wt 42.6 kg (94 lb)   BMI 24.44 kg/m    Body mass index is 24.44 kg/m .  GENERAL APPEARANCE: alert with NAD  NECK: goiter  RESP: lungs clear to auscultation - no rales, rhonchi or wheezes. Mild asymmetry with expansion due to scoliosis.  CV: regular rate and rhythm. no murmur, click,  rub or gallop  MS: no tenderness or swelling along spine or paraspinal muscles. Point tenderness over left pectoralis muscles. No ecchymosis or swelling. No swelling in bilateral legs or ankles.   SKIN: no bruising, rash, or lesions    Assessment & Plan:     1. Costochondritis  Most likely costochondritis due to replication of pain with palpation over left pectoralis muscle with no recent trauma. Also considered a cardiac etiology but no edema, murmurs, tachycardia or hypotension. Unlikely to be respiratory given no cough or shortness of breath and normal pulmonary exam.     -The patient is advised to take tylenol PRN for pain and apply heat intermittently (avoid sleeping on heating pad).  - Follow up in 1 week or if pain  worsens.    Options for treatment and follow-up care were reviewed with the patient and/or guardian. Vignesh Fallon and/or guardian engaged in the decision making process and verbalized understanding of the options discussed and agreed with the final plan.    Jocelin Junior, MS3    I was present with the medical student who participated in the service and in the documentation of this note. I have verified the history and personally performed the physical exam and medical decision making, and have verified the content of the note, which accurately reflects my assessment of the patient and the plan of care.    Amanda Mosher MD  [facility] Medicine Resident    Precepted with: Shirley Xiong MD

## 2019-10-18 NOTE — NURSING NOTE
Chief Complaint   Patient presents with     Pain     left side rib pain.      Leg Pain     Bilateral pain- chronic     Medication Reconciliation     unable.        /58   Pulse 72   Temp 97.7  F (36.5  C) (Oral)   Resp 16   Wt 42.6 kg (94 lb)   BMI 24.44 kg/m        Already rec'd flu shot ~ last week at Adult day care  ~ Dami VALERA CMA ([place])          Due to patient being non-English speaking/uses sign language, an  was used for this visit. Only for face-to-face interpretation by an external agency, date and length of interpretation can be found on the scanned worksheet.     name: Mary Armida  Language: Tyraong  Agency: [employer]  Phone number: [phone]  Type of interpretation: Face to Face spoken

## 2019-10-18 NOTE — PROGRESS NOTES
"  Subjective:   Vignesh Fallon is a 84 year old year old female who presents to clinic today for the following health issues:      CHEST PAIN     Onset: ***    Description:   Location:  {.:988404}  Character: {.:530219}  Radiation: {.:767315}  Duration: {.:882585}     Intensity: {.:580655}    Progression of Symptoms:  {.:562551}    Accompanying Signs & Symptoms:  Shortness of breath: { :666039::\"no\"}  Sweating: { :937328::\"no\"}  Nausea/vomiting: { :714661::\"no\"}  Lightheadedness: { :111426::\"no\"}  Palpitations: {.:851854::\"no\"}  Fever/Chills: { :579900::\"no\"}  Cough: { :054720::\"no\"}  Heartburn: { :720341::\"no\"}    History:   Family history of heart disease { :824552::\"no\"}  Tobacco use: { :321117::\"no\"}    Precipitating factors:   Worse with exertion: { :401252::\"no\"}  Worse with deep breaths :  { :458918::\"no\"}  Related to food: { :296539::\"no\"}    Alleviating factors:  ***       Therapies Tried and outcome: ***      {:282077}    PMHX:     Patient Active Problem List   Diagnosis     Vitamin D deficiency     Chronic kidney disease, stage 4 (severe) (H)     DM (diabetes mellitus), type 2 with renal complications (H)     Esophageal reflux     Goiter     Essential hypertension     Primary osteoarthritis of both knees     Latent tuberculosis by blood test     Anemia, unspecified type     Moderate episode of recurrent major depressive disorder (H)     Pulmonary nodules       Current Outpatient Medications   Medication Sig Dispense Refill     acetaminophen (TYLENOL) 650 MG CR tablet Take 1 tablet (650 mg) by mouth every 8 hours as needed for mild pain or fever 90 tablet 1     blood glucose monitoring (NO BRAND SPECIFIED) meter device kit Use to test blood sugar up to 3 times daily or as directed. 1 kit 0     famotidine (PEPCID) 10 MG tablet Take 1 tablet (10 mg) by mouth 2 times daily 120 tablet 5     lisinopril (PRINIVIL/ZESTRIL) 5 MG tablet Take 1 tablet (5 mg) by mouth daily 60 tablet 0     multivitamin " "(ONE-DAILY) tablet Take 1 tablet by mouth       Nutritional Supplements (ENSURE NUTRITION SHAKE) LIQD Take 1 Bottle by mouth 2 times daily 60 Bottle 11     order for DME Equipment being ordered: Crutches (Patient not taking: Reported on 9/27/2019) 1 Units 0     order for DME Equipment being ordered: 4 wheeled walker 1 Units 0     Social History     Tobacco Use     Smoking status: Never Smoker     Smokeless tobacco: Never Used   Substance Use Topics     Alcohol use: No     Drug use: No      Allergies   Allergen Reactions     No Known Allergies        Review of Systems:     {ROS COMP:813330::\"Constitutional, HEENT, cardiovascular, pulmonary, GI, musculoskeletal, neuro, skin, and psych systems are negative, except as otherwise noted.\"}     Objective:     There were no vitals taken for this visit.  There is no height or weight on file to calculate BMI.  {EXAM -OPEN/NORMAL/ABNL:394652::\"GENERAL APPEARANCE: healthy, alert and no distress,\"}    Diagnostic Test Results:  No results found for this or any previous visit (from the past 24 hour(s)).    Assessment & Plan:     There are no diagnoses linked to this encounter.    Options for treatment and follow-up care were reviewed with the patient and/or guardian. Vignesh Fallon and/or guardian engaged in the decision making process and verbalized understanding of the options discussed and agreed with the final plan.    Amanda Mosher MD  [facility] Medicine Resident    Precepted with: {pvpreceptors:372123}            "

## 2019-10-25 ENCOUNTER — TRANSFERRED RECORDS (OUTPATIENT)
Dept: HEALTH INFORMATION MANAGEMENT | Facility: CLINIC | Age: 84
End: 2019-10-25

## 2019-11-14 ENCOUNTER — TELEPHONE (OUTPATIENT)
Dept: FAMILY MEDICINE | Facility: CLINIC | Age: 84
End: 2019-11-14

## 2019-11-14 NOTE — TELEPHONE ENCOUNTER
Called Cheyanne, was advised patient had to reschedule CT. Will keep in inbasket and fax result once available. Edda XIONG

## 2019-11-14 NOTE — TELEPHONE ENCOUNTER
[facility] Family Medicine phone call message- general phone call:    Reason for call: Chest Ct scan, patient no show in September. Called son and reschedule for 11/22/19 at [facility] at 1:25 PM. Need to see chest CT result in order to close out at their end of the TB clinic.  Need result sent to Fax [phone]. Attention Nurse Cheyanne when it is in    Return call needed: No    OK to leave a message on voice mail? Yes    Primary language: Hmong      needed? Yes    Call taken on November 14, 2019 at 10:02 AM by Alexey Rodney

## 2019-12-02 NOTE — TELEPHONE ENCOUNTER
Called [facility] and advised no records of CT from 11/22. Requested call-back with further concerns or questions. Edda XIONG

## 2019-12-06 ENCOUNTER — OFFICE VISIT (OUTPATIENT)
Dept: FAMILY MEDICINE | Facility: CLINIC | Age: 84
End: 2019-12-06

## 2019-12-06 VITALS
HEIGHT: 55 IN | TEMPERATURE: 98 F | DIASTOLIC BLOOD PRESSURE: 82 MMHG | HEART RATE: 69 BPM | RESPIRATION RATE: 18 BRPM | WEIGHT: 92.8 LBS | BODY MASS INDEX: 21.47 KG/M2 | SYSTOLIC BLOOD PRESSURE: 208 MMHG | OXYGEN SATURATION: 99 %

## 2019-12-06 DIAGNOSIS — I10 ESSENTIAL HYPERTENSION: Primary | ICD-10-CM

## 2019-12-06 DIAGNOSIS — M19.90 OSTEOARTHRITIS, UNSPECIFIED OSTEOARTHRITIS TYPE, UNSPECIFIED SITE: ICD-10-CM

## 2019-12-06 DIAGNOSIS — N18.4 CHRONIC KIDNEY DISEASE, STAGE 4 (SEVERE) (H): ICD-10-CM

## 2019-12-06 DIAGNOSIS — R22.1 NECK MASS: ICD-10-CM

## 2019-12-06 DIAGNOSIS — L98.9 SKIN LESION: ICD-10-CM

## 2019-12-06 DIAGNOSIS — N18.4 ANEMIA DUE TO STAGE 4 CHRONIC KIDNEY DISEASE (H): ICD-10-CM

## 2019-12-06 DIAGNOSIS — D63.1 ANEMIA DUE TO STAGE 4 CHRONIC KIDNEY DISEASE (H): ICD-10-CM

## 2019-12-06 LAB
% GRANULOCYTES: 71.7 %G (ref 40–75)
BUN SERPL-MCNC: 37 MG/DL (ref 7–30)
CALCIUM SERPL-MCNC: 8.6 MG/DL (ref 8.5–10.4)
CHLORIDE SERPLBLD-SCNC: 113 MMOL/L (ref 94–109)
CO2 SERPL-SCNC: 23 MMOL/L (ref 20–32)
CREAT SERPL-MCNC: 2.1 MG/DL (ref 0.6–1.3)
EGFR CALCULATED (BLACK REFERENCE): 28.9 ML/MIN
EGFR CALCULATED (NON BLACK REFERENCE): 23.8 ML/MIN
GLUCOSE SERPL-MCNC: 105 MG/DL (ref 60–109)
GRANULOCYTES #: 4.3 K/UL (ref 1.6–8.3)
HCT VFR BLD AUTO: 28.5 % (ref 35–47)
HEMOGLOBIN: 8.8 G/DL (ref 11.7–15.7)
LYMPHOCYTES # BLD AUTO: 1.2 K/UL (ref 0.8–5.3)
LYMPHOCYTES NFR BLD AUTO: 20.1 %L (ref 20–48)
MCH RBC QN AUTO: 27.6 PG (ref 26.5–35)
MCHC RBC AUTO-ENTMCNC: 30.9 G/DL (ref 32–36)
MCV RBC AUTO: 89.2 FL (ref 78–100)
MID #: 0.5 K/UL (ref 0–2.2)
MID %: 8.2 %M (ref 0–20)
PLATELET # BLD AUTO: 246 K/UL (ref 150–450)
POTASSIUM SERPL-SCNC: 4.7 MMOL/L (ref 3.4–5.3)
RBC # BLD AUTO: 3.19 M/UL (ref 3.8–5.2)
SODIUM SERPL-SCNC: 143 MMOL/L (ref 133–144)
TSH SERPL DL<=0.05 MIU/L-ACNC: 0.18 UIU/ML (ref 0.3–5)
WBC # BLD AUTO: 6 K/UL (ref 4–11)

## 2019-12-06 RX ORDER — LISINOPRIL 5 MG/1
5 TABLET ORAL DAILY
Qty: 60 TABLET | Refills: 0 | Status: SHIPPED | OUTPATIENT
Start: 2019-12-06 | End: 2020-03-13

## 2019-12-06 RX ORDER — INFLUENZA A VIRUS A/VICTORIA/2454/2019 IVR-207 (H1N1) ANTIGEN (PROPIOLACTONE INACTIVATED), INFLUENZA A VIRUS A/HONG KONG/2671/2019 IVR-208 (H3N2) ANTIGEN (PROPIOLACTONE INACTIVATED), INFLUENZA B VIRUS B/VICTORIA/705/2018 BVR-11 ANTIGEN (PROPIOLACTONE INACTIVATED), INFLUENZA B VIRUS B/PHUKET/3073/2013 BVR-1B ANTIGEN (PROPIOLACTONE INACTIVATED) 15; 15; 15; 15 UG/.5ML; UG/.5ML; UG/.5ML; UG/.5ML
INJECTION, SUSPENSION INTRAMUSCULAR
Refills: 0 | COMMUNITY
Start: 2019-10-19 | End: 2020-06-12

## 2019-12-06 ASSESSMENT — MIFFLIN-ST. JEOR: SCORE: 657.5

## 2019-12-06 NOTE — NURSING NOTE
Due to patient being non-English speaking/uses sign language, an  was used for this visit. Only for face-to-face interpretation by an external agency, date and length of interpretation can be found on the scanned worksheet.     name: Alia Scott  Agency: [employer]  Language: Hmong   Telephone number: [phone]  Type of interpretation: Face-to-face, spoken   EMILIE Lowry

## 2019-12-06 NOTE — PROGRESS NOTES
Assessment and Plan   (I10) Essential hypertension  (primary encounter diagnosis)  Comment: Her BP was significantly elevated today.  Fortunately, her history indicated that her symptoms were likely due to her chronic conditions rather than this elevated BP, and that the likely etiology is that she stopped her lisinopril, which she has a history of doing in the past and then spiking high BP readings.  BMP and CBC were at baseline.  Waiting on TSH results.  F/u in two weeks.  Plan: Basic Metabolic Panel ([facility]) - Results < 1         hr, TSH  Sensitive ([facility]), CBC with Diff         Plt ([facility]), lisinopril (PRINIVIL/ZESTRIL) 5         MG tablet, CANCELED: CBC w/ Diff and Plt         ([facility])          (N18.4) Chronic kidney disease, stage 4 (severe) (H)  Comment: BMP and CBC at baseline.  She has anemia likely related to her CKD.  F/u in 2 weeks and will offer nephrology referral for her advanced CKD.  Plan: Basic Metabolic Panel ([facility]) - Results < 1         hr, CBC with Diff Plt ([facility]), CANCELED: CBC         w/ Diff and Plt ([facility])          (M19.90) Osteoarthritis, unspecified osteoarthritis type, unspecified site  Comment: Patient's description of her aches and pains is consistent with chronic pain from her arthritis.  It is actually improving.  She does not want PT at this time.  Plan: Continue to follow up.  Continue Tylenol.  Consider trying PT again in the future.    (R22.1) Neck mass  Comment: Per history, patient has had this for quite some time.  It may actually be her thyroid, as she has a history of goiter per chart review.  Plan: TSH, consider US imaging.    (L98.9) Skin lesion  Comment: Differential includes actinic keratosis and seborrheic keratosis.    Plan: F/u in 2 weeks and obtain more thorough history on their development, symptoms, and growth.    (N18.4,  D63.1) Anemia due to stage 4 chronic kidney disease (H)  Comment: Hgb 8.8, at baseline.  This is likely due to her CKD, as  she denies any blood loss.  Plan: F/u in 2 weeks.  Consider nephrology referral.    Follow up in 2 Week(s).    Options for treatment and follow-up care were reviewed with the patient and/or guardian. Vignesh Fallon and/or guardian engaged in the decision making process and verbalized understanding of the options discussed and agreed with the final plan.    Shan Le MD  [facility], PGY-1    Precepted patient with Dr. Clem Grace   Preceptor Attestation:  I saw and evaluated the patient.  I reviewed the resident physician's history, exam, and treatment plan; and I agree with the documentation by the resident physician.  Chart review during visit showed patient had similar high BP readings when she stopped lisinopril, with return to good BP control when lisinopril restarted.  No acute neurologic or cardiovascular symptoms, headache improved compared to 2 days ago. Resume lisinopril today, close follow up; to ED if new neuro, chest or dyspnea  symptoms.  Anemia likely complicated by CKD4, BMP+CBC today. See resident note for details.  Supervising Physician:  Clem Grace MD         HPI:   Vignesh Fallon is a 84 year old female with PMH of HTN, HLD, CAD, osteoarthritis, CKD4, MDD who presents to clinic today for   Chief Complaint   Patient presents with     Generalized Body Aches     Pt complains of complete body ache x1 wk. Nauseous and headaches     Medication Reconciliation     Incomplete Pt didn't bring in medication     Pt states she has headaches, body aches everywhere, especially her back and shoulders, and is wondering about the dark spots on her face.    She is feeling nauseated and her BP is significantly elevated to 214/87 and 189/77 on recheck at the end of the visit.  She has been feeling this way for several days to maybe a couple months.  She is not sure.  Sometimes she has to throw up to feel better, most recently two days ago.  She  states there has not been anything black or bloody in her emesis.  Her nausea is feeling better than usual with a Hmong medication she has been trying over the last few days.  She is more tired than usual.      She denies chest pain or shortness of breath.  Her headache has been going on for a long time but feels better today and yesterday.  She has been experiencing symptoms for two months.  She states she has blurry vision but that it has been going on for a long time.  She denies recent changes.      She states she has not been taking her lisinopril because she was told that her blood pressure was low.      Her body aches have been going on for 2-3 months and are actually feeling better now than when she set up her appointment.  Her Tylenol helps.  Activities hurt, resting helps.  She has not seen a physical therapist.    She has a neck mass that she wants removed. She has had it for many years, since she was young, and did not want it removed in the past because it was not bothering her.    She lives with her grandson.     [employer]  was used for this visit         Review of Systems:     10 point ROS negative except as noted in HPI.         PMHX:   Active Problems List  Patient Active Problem List   Diagnosis     Vitamin D deficiency     Chronic kidney disease, stage 4 (severe) (H)     DM (diabetes mellitus), type 2 with renal complications (H)     Esophageal reflux     Goiter     Essential hypertension     Primary osteoarthritis of both knees     Latent tuberculosis by blood test     Anemia, unspecified type     Moderate episode of recurrent major depressive disorder (H)     Pulmonary nodules     Active problem list reviewed and updated.    Current Medications  Current Outpatient Medications   Medication Sig Dispense Refill     lisinopril (PRINIVIL/ZESTRIL) 5 MG tablet Take 1 tablet (5 mg) by mouth daily 60 tablet 0     acetaminophen (TYLENOL) 650 MG CR tablet Take 1 tablet (650 mg) by mouth every 8  "hours as needed for mild pain or fever 90 tablet 1     AFLURIA QUADRIVALENT 0.5 ML injection ANNUAL FLU SHOT- 0.5ML IM  0     blood glucose monitoring (NO BRAND SPECIFIED) meter device kit Use to test blood sugar up to 3 times daily or as directed. 1 kit 0     famotidine (PEPCID) 10 MG tablet Take 1 tablet (10 mg) by mouth 2 times daily 120 tablet 5     multivitamin (ONE-DAILY) tablet Take 1 tablet by mouth       Nutritional Supplements (ENSURE NUTRITION SHAKE) LIQD Take 1 Bottle by mouth 2 times daily 60 Bottle 11     order for DME Equipment being ordered: Crutches (Patient not taking: Reported on 9/27/2019) 1 Units 0     order for DME Equipment being ordered: 4 wheeled walker 1 Units 0     Medication list reviewed and updated.    Social History  Social History     Tobacco Use     Smoking status: Never Smoker     Smokeless tobacco: Never Used   Substance Use Topics     Alcohol use: No     Drug use: No     History   Drug Use No       Family History  Family History   Problem Relation Age of Onset     Cancer No family hx of      Diabetes No family hx of      Coronary Artery Disease No family hx of      Heart Disease No family hx of      Hypertension No family hx of        Allergies  Allergies   Allergen Reactions     No Known Allergies             Physical Exam:     Vitals:    12/06/19 0835 12/06/19 0837   BP: (!) 214/82 (!) 208/82   Pulse: 69    Resp: 18    Temp: 98  F (36.7  C)    TempSrc: Oral    SpO2: 99%    Weight: 42.1 kg (92 lb 12.8 oz)    Height: 1.308 m (4' 3.5\")      Body mass index is 24.6 kg/m .    GENERAL APPEARANCE: alert, appears stated age, no acute distress  HEENT: Eyes grossly normal to inspection, nares normal, and mouth and throat without erythema, ulcers, or lesions.  Patient has several lesions on her face ranging from 0.5-2 cm across that are flaky and brown. She also has a significant neck mass, roughly 6-7 cm across, greater on the right than on the left.  It is slightly mobile and nontender.  "   RESP: lungs clear to auscultation - no rales, rhonchi, or wheezes  CV: regular rate and rhythm, mild systolic murmur.  ABDOMEN: soft, nontender   MSK: extremities normal, no gross deformities noted, no lower extremity edema  SKIN: no suspicious lesions or rashes   NEURO: Normal strength and tone, sensory exam grossly normal, and speech normal, alert, oriented to person and place but stated month was October rather than December, remembered two of three items immediately when asked, remembered 3 of 3 items after 5 minutes.  PSYCH: mood and affect appropriate    GENERAL APPEARANCE: healthy, alert and no distress,

## 2019-12-09 ENCOUNTER — TELEPHONE (OUTPATIENT)
Dept: FAMILY MEDICINE | Facility: CLINIC | Age: 84
End: 2019-12-09

## 2019-12-09 NOTE — TELEPHONE ENCOUNTER
[facility] Family Medicine phone call message- general phone call:    Reason for call: Hematology has not received the lab results from a sample for the pt. Calling to see if there are any or if it had been cancelled. Latanya would like a call back. [facility] call and advise.    Return call needed: Yes    OK to leave a message on voice mail? Yes    Primary language: ong      needed? Yes    Call taken on December 9, 2019 at 10:35 AM by Gallo Santana

## 2019-12-26 ENCOUNTER — TELEPHONE (OUTPATIENT)
Dept: FAMILY MEDICINE | Facility: CLINIC | Age: 84
End: 2019-12-26

## 2019-12-26 DIAGNOSIS — R91.1 LESION OF RIGHT LUNG: Primary | ICD-10-CM

## 2019-12-26 NOTE — TELEPHONE ENCOUNTER
[facility] Family Medicine phone call message- general phone call:    Reason for call: Caller wants to know if this patient has an Chest CT done yet or scheduled as this patient was ruled out to have active TB.    Return call needed: Yes    OK to leave a message on voice mail? Yes    Primary language: Zion      needed? Yes    Call taken on December 26, 2019 at 8:26 AM by Alexey Rodney

## 2019-12-26 NOTE — TELEPHONE ENCOUNTER
I did see in problem list patient is latent TB and a positive gold quant. as of 2017 but negative CXR and is suppose to be follow up at [facility]. Shall I route [facility] back to them?

## 2019-12-26 NOTE — TELEPHONE ENCOUNTER
Unsure if patient is apart of TB exposure list. Routing to Dr Redding for confirmation. Patient no showed last appointment with Dr Le on 12/20/19 but no lab appointment for possible TB exposure. A CT scan is not required for confirmation of TB but an Xray would be and I do not see an order for that either.

## 2019-12-30 NOTE — TELEPHONE ENCOUNTER
I already spoke to Azam the same day and lab was cancelled for HE and ran in house instead. CXpiedad, JB

## 2019-12-31 NOTE — TELEPHONE ENCOUNTER
Caller is indicating that patient has active TB that has not been treated by provider. Caller indicate that provider has tried 2x to schedule a appt with patient but patient has no show the most recent one being in Dec. Caller wanted to know if there was proof that patient was seen in clinic for TB, advise that Lilly will contact caller back to discuss further.

## 2020-01-02 NOTE — TELEPHONE ENCOUNTER
On review, this patient had a CXR with [facility]   CXR: 1-18-19 was negative  CXR: 9-4-19 that showed a 2cm focal lesion and recommended CT scan.    She no-showed her CT scan for 9-23-19 and was to reschedule for 11-22-19. This does not appear to have been completed.    She needs a CT without contrast. Please have her set up. Probably need to call the grandson to set up.

## 2020-01-06 NOTE — TELEPHONE ENCOUNTER
I spoke to figueroa at [facility] patient informed CT needed to assess the nodule found in xray, patient was exposed to TB 4/2017 and also re-exposed again 4/2019 and patient refused to do follow up for further treatment if needed. Figueroa just called to inform us to try and urge patient to keep following up and ensure TB is latent and also that we find out further information getting a CT scan completed. It is now up to patient. But has been refusing care.    Hopefully caller can encourage patient into completing CT scan. Referrals go to [facility] routing to her as well in hope patient changes their mind

## 2020-01-10 ENCOUNTER — RECORDS - HEALTHEAST (OUTPATIENT)
Dept: ADMINISTRATIVE | Facility: OTHER | Age: 85
End: 2020-01-10

## 2020-01-17 NOTE — TELEPHONE ENCOUNTER
Tried reaching patient without success, faxed referral over to [facility].

## 2020-02-12 ENCOUNTER — TELEPHONE (OUTPATIENT)
Dept: FAMILY MEDICINE | Facility: CLINIC | Age: 85
End: 2020-02-12

## 2020-02-12 NOTE — TELEPHONE ENCOUNTER
Caller states she notified patient she would help schedule ct. Caller states she needed number where to call to schedule. Notified [facility] and provided caller with number and place to call.

## 2020-02-12 NOTE — TELEPHONE ENCOUNTER
Caller tried calling [facility] to schedule a chest CT for patient but was advised that the referral has . Caller would like to get a new order faxed over to [facility] so they can schedule for appointment.

## 2020-02-12 NOTE — TELEPHONE ENCOUNTER
[facility] Family Medicine phone call message- general phone call:    Reason for call: FYI: Calling to see if someone could contact her to reschedule the CT scan that was cancel twice. 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 The last chest xray was on 09/04/2019. Needs chest xray every 6 months because she had an abnormal chest xray they want her to have a CT.      Return call needed:     OK to leave a message on voice mail?     Primary language: Hmong      needed? Yes    Call taken on February 12, 2020 at 11:13 AM by Rafiq Scott

## 2020-02-13 ENCOUNTER — RECORDS - HEALTHEAST (OUTPATIENT)
Dept: ADMINISTRATIVE | Facility: OTHER | Age: 85
End: 2020-02-13

## 2020-02-13 DIAGNOSIS — Z22.7 LATENT TUBERCULOSIS BY BLOOD TEST: Primary | ICD-10-CM

## 2020-02-13 NOTE — TELEPHONE ENCOUNTER
Called caller to notify her that the order has been faxed to [facility], if she needs anything else she can call me at the clinic.

## 2020-02-13 NOTE — PATIENT INSTRUCTIONS
Referral for :     CT chest    LOCATION/PLACE/Provider :    [facility]   DATE & TIME :      PHONE :     [phone]  FAX :    [phone]  Appointment made by clinic staff/:    [facility]

## 2020-02-18 ENCOUNTER — TELEPHONE (OUTPATIENT)
Dept: FAMILY MEDICINE | Facility: CLINIC | Age: 85
End: 2020-02-18

## 2020-02-18 DIAGNOSIS — R91.8 PULMONARY NODULES: Primary | ICD-10-CM

## 2020-02-18 NOTE — TELEPHONE ENCOUNTER
[facility] phone call message - order or referral request for patient:     Order or referral being requested: CT chest       Additional Comments: Wondering if you wanted this order with contrast, as the last one's were ordered without. Please call and advise.     OK to leave a message on voice mail? Yes    Primary language: Hmong      needed? Yes    Call taken on February 18, 2020 at 1:32 PM by Kelsey Rai

## 2020-02-22 ENCOUNTER — TRANSFERRED RECORDS (OUTPATIENT)
Dept: HEALTH INFORMATION MANAGEMENT | Facility: CLINIC | Age: 85
End: 2020-02-22

## 2020-02-27 ENCOUNTER — RECORDS - HEALTHEAST (OUTPATIENT)
Dept: ADMINISTRATIVE | Facility: OTHER | Age: 85
End: 2020-02-27

## 2020-02-27 NOTE — TELEPHONE ENCOUNTER
Covering for Dr. Watson while away. Reordered CT chest as w/o contrast.     Benjamin Rosenstein, MD, MA  [facility]  P: [phone]

## 2020-02-27 NOTE — TELEPHONE ENCOUNTER
New order has been faxed to [email or web address], patient is scheduled for March 2nd.

## 2020-03-13 DIAGNOSIS — I10 ESSENTIAL HYPERTENSION: ICD-10-CM

## 2020-03-15 RX ORDER — LISINOPRIL 5 MG/1
5 TABLET ORAL DAILY
Qty: 60 TABLET | Refills: 0 | Status: SHIPPED | OUTPATIENT
Start: 2020-03-15 | End: 2020-05-11

## 2020-04-28 ENCOUNTER — DOCUMENTATION ONLY (OUTPATIENT)
Dept: FAMILY MEDICINE | Facility: CLINIC | Age: 85
End: 2020-04-28

## 2020-04-28 NOTE — PROGRESS NOTES
Telephonic assessment completed with client for an annual health risk assessment and PCA reassessment. Client s daughter in law/responsible party, Shelbie Lorenzo, and her son, Ryland, were also present. An  was not needed. Assessment completed telephonically due to current COVID 19.    Current situation/living environment  Vignesh continues to live with her son (Ryland) and DIL/RP (Shelbie) in a single family home.    Activities of daily living (ADL)/instrumental activities of daily living (IADL) and functional issues  Client needs help with the following ADL's: dressing, grooming, bathing, eating, transfers, walking and toileting  Client needs help with the following IADL's: shopping, cooking, housekeeping, laundry, managing finances/bills and transportation  Client states she is unable to perform the above due to chronic back pain, pain in both knees, unstable lower extremities, as well as limited ROM of upper extremities.    Health concerns for today  There have been no major changes in health within the last year. Vignesh s primary health concern today is poor hearing of left ear. Must shout for Vignesh to hear with left ear otherwise she can hear regular conversation level noise in right. Per family, rainer has had audiology exam before and received hearing aids but she refused to wear them. No longer has those aids and unsure as to how long ago she received them. Cljanice states she would be open to trying hearing aids again and would like CC to assist with scheduling a hearing exam once COVID 19 restrictions are lifted. Rainer also reports blood sugars have been high but she has not been able to check because she doesn t have a glucometer. Reports [facility] but [facility] have been temporarily closed due to COVID 19. Family states previous glucometers have broken and that insurance will no longer pay. CC to assist with request for new glucometer. Vignesh continues to experience chronic  "pain in back, knees, and legs. Pain is constant and rated 10 at its worst but 5 on average. Manages pain with Tylenol as Rx d PRN. Vignesh reports left leg pain is worse than right. \"Every step I take is weak due to pain\". Cljanice reports steroid injection to shoulder last year worked for pain and was encouraged by CC to discuss possible steroid injection to knees if needed too. Cljanice agrees to discuss with PCP if she feels she needs more pain control options. Continues to walk with wheeled walker at all times. Denies any recent falls and no recent ED nor hospitalizations within the last year.     Has patient fallen 2 or more times in the last year? No  Has patient fallen with injury in the last year? No    Cognition/mental health  h/o being verbally aggressive toward caregivers multiple times per day, per son and RP. She will raise her voice and yell at others when she feels upset and will refuse cares on a daily basis. Family has been helping her manage with this for many years. They often will re-attempt cares at later times and allow Vignesh to calm down. Vignesh has an up and down relationship with her family caregivers, especially her son and DIL (Ryland and Shelbie). She often reports they do a poor job of caring for her and then restates her remarks and says that they do take good care of her. CC has not witnessed any abuse nor neglect but due to h/o of these reports from clt, CC did report concerns to Laurie, PCA Supervisor at PCA agency ([facility]) to monitor and discuss with RP and PCA. Coby s PCAs are her two grandsons, both sons of Josh. DON with [facility] has reached out to Vignesh. They will be revising a new care plan, conduct monthly monitoring, and report to Adult Protection if they do find reason for concern. They will also keep CC updated on progress.  H/o Major Depression but has refused MH services in past. [facility] services have helped her to manage Depression well over the last few years and she is " eager to return to [facility] once COVID 19 restrictions lifted. Reports that she has been able to self-manage in the interim.  STARS/Med Adherence  Client is non-compliant with the following quality measures: Not on STARs report  Comments: NA    Client's Plan of Care consists of:  Adult day care (5 days per month)  Personal care assistance (PCA) (6.75 hours per day)  CC to assist with request for glucometer  CC to assist with scheduling for an audiology exam  Follow-up in 6 months or PRN    Brady Freeman RN PHN  [facility]aged [facility] Coordinator  [phone]

## 2020-04-30 DIAGNOSIS — E11.29 TYPE 2 DIABETES MELLITUS WITH OTHER DIABETIC KIDNEY COMPLICATION, WITHOUT LONG-TERM CURRENT USE OF INSULIN (H): Primary | ICD-10-CM

## 2020-05-11 DIAGNOSIS — I10 ESSENTIAL HYPERTENSION: ICD-10-CM

## 2020-05-12 RX ORDER — LISINOPRIL 5 MG/1
5 TABLET ORAL DAILY
Qty: 30 TABLET | Refills: 1 | Status: SHIPPED | OUTPATIENT
Start: 2020-05-12 | End: 2020-06-12

## 2020-06-10 ENCOUNTER — TELEPHONE (OUTPATIENT)
Dept: FAMILY MEDICINE | Facility: CLINIC | Age: 85
End: 2020-06-10

## 2020-06-10 NOTE — TELEPHONE ENCOUNTER
[facility] phone call message- general phone call:    Reason for call: Son called asking what he should do in regards to his mother (pt) as to scheduling appts. He said that pt is hard of hearing but has a hard time getting out of bed to come to appts and with only him assisting, it is difficult to bring pt to future appts. Told pt that they can come in to  Auth to discuss form and if they have legal documents as he is the pt PCA and his wife is responsible party. Son stated he will bring in documents and come in tomorrow to  Form. Son would like a call back if he should sched ofv/televisit or Video visit. Only thing is pt is hard of hearing. Pls call and advise.    Return call needed: Yes    OK to leave a message on voice mail? Yes    Primary language: Tyraong      needed? Yes    Call taken on Ayana 10, 2020 at 4:01 PM by Gallo Santana

## 2020-06-11 ENCOUNTER — MEDICAL CORRESPONDENCE (OUTPATIENT)
Dept: HEALTH INFORMATION MANAGEMENT | Facility: CLINIC | Age: 85
End: 2020-06-11

## 2020-06-11 NOTE — TELEPHONE ENCOUNTER
Spoke to the son, no further action needed by triage. His concern was related to administrative consent form and process. Iza RN

## 2020-06-12 ENCOUNTER — VIRTUAL VISIT (OUTPATIENT)
Dept: FAMILY MEDICINE | Facility: CLINIC | Age: 85
End: 2020-06-12
Payer: COMMERCIAL

## 2020-06-12 DIAGNOSIS — R11.2 NAUSEA AND VOMITING, INTRACTABILITY OF VOMITING NOT SPECIFIED, UNSPECIFIED VOMITING TYPE: Primary | ICD-10-CM

## 2020-06-12 DIAGNOSIS — I10 ESSENTIAL HYPERTENSION: ICD-10-CM

## 2020-06-12 RX ORDER — LISINOPRIL 5 MG/1
5 TABLET ORAL DAILY
Qty: 30 TABLET | Refills: 0 | Status: SHIPPED | OUTPATIENT
Start: 2020-06-12 | End: 2020-07-22

## 2020-06-12 NOTE — PROGRESS NOTES
I have personally reviewed the history and examination as documented by Dr. Mosher.  I was present via video chat during key portions of the visit and agree with the assessment and plan as documented for 84 yr old female with dementia,DM, CKD evaluated for vomiting. Concern for end-stage deconditioning and failure to thrive as pt w/ minimal PO intake.  Recommend taking her to ED. Precautions given. Anticipatory guidance given.     Bam Singer MD  June 12, 2020  9:28 AM

## 2020-06-12 NOTE — PROGRESS NOTES
"Family Medicine Video Visit Note  Vignesh is being evaluated via a billable video visit.           Video Visit Consent     Patient was verbally read the following and verbal consent was obtained.  \"Video visits are billed at different rates depending on your insurance coverage. During this emergency period, for some insurers they may be billed the same as an in-person visit.  Please reach out to your insurance provider with any questions.  If during the course of the call the physician/provider feels a video visit is not appropriate, you will not be charged for this service.\"     (Name person giving consent:  Patient and son   Date verbal consent given:  6/12/2020  Time verbal consent given:  8:41 AM)    Patient would like the video invitation sent by: Text to cell phone: [phone]    Chief Complaint   Patient presents with     Sick     vomitting and unable to eat. A little better today with vomitting. Feeling weak and unable to get up much to go to the bathroom or ask for food. Would like to discuss patient's health.      Medication Reconciliation     completed. Needs attention     Due to patient being non-English speaking/uses sign language, an  was used for this visit. Only for face-to-face interpretation by an external agency, date and length of interpretation can be found on the scanned worksheet.     name: Leo Huffman  Agency: [employer]  Language: Zion   Telephone number: [phone]  Type of interpretation: Video [facility] conference call         HPI     Video Start Time: 8:50 AM    Vignesh presents to clinic today to discuss vomiting. History is provided by patient's family.     1. Vomiting: The vomiting started May 31st. It occurs nearly daily. It is non-bloody and non-bilious. She previously was able to eat or drink but over the last 3 days she has been unable to eat or drink and has been vomiting more frequently. She has become so weak that she is unable to feed herself, they have tried to " "spoon feed her water and broth. She has had decreased urine output over the last 3 days. No hematuria or foul-smelling urine. No diarrhea but has had no bowel movement over the last 3 days. No fevers but has cooler extremities. No complaints of cough, shortness of breath, chest pain, abdominal pain. Slight headache.     2. Code status: they have not previously discussed. Open to recommendations from healthcare provider but family unable to commit to a code status at this time - want to discuss amongst themselves.    Current Outpatient Medications   Medication Sig Dispense Refill     acetaminophen (TYLENOL) 650 MG CR tablet Take 1 tablet (650 mg) by mouth every 8 hours as needed for mild pain or fever 90 tablet 1     blood glucose (NO BRAND SPECIFIED) lancets standard Use to test blood sugar 4 times daily or as directed. 100 each 3     blood glucose (NO BRAND SPECIFIED) test strip Use to test blood sugar 4 times daily or as directed. 100 each 11     blood glucose monitoring (NO BRAND SPECIFIED) meter device kit Use to test blood sugar 4 times daily or as directed. 1 kit 0     blood glucose monitoring (NO BRAND SPECIFIED) meter device kit Use to test blood sugar up to 3 times daily or as directed. 1 kit 0     famotidine (PEPCID) 10 MG tablet Take 1 tablet (10 mg) by mouth 2 times daily 120 tablet 5     lisinopril (ZESTRIL) 5 MG tablet Take 1 tablet (5 mg) by mouth daily 30 tablet 1     multivitamin (ONE-DAILY) tablet Take 1 tablet by mouth       order for DME Equipment being ordered: 4 wheeled walker 1 Units 0     Allergies   Allergen Reactions     No Known Allergies           Review of Systems:     ROS negative unless stated above.         Physical Exam:     There were no vitals taken for this visit.  Estimated body mass index is 24.6 kg/m  as calculated from the following:    Height as of 12/6/19: 1.308 m (4' 3.5\").    Weight as of 12/6/19: 42.1 kg (92 lb 12.8 oz).    GENERAL: thin, frail, elderly female lying on " the couch  RESP: No audible wheeze, cough, or visible cyanosis.  No visible retractions or increased work of breathing.    SKIN:  No significant rash visible  NEURO: alert but minimally interactive        Assessment and Plan     1. Nausea and vomiting, intractability of vomiting not specified, unspecified vomiting type  Patient with nearly 2 weeks of vomiting with 3 days of increased vomiting, decreased oral intake, and generalized weakness. I am concerned for dehydration as the cause of her generalized weakness. Broad differential including infection, end-stage dementia, adult failure to thrive, metabolic abnormality. I strongly recommended that they bring patient to the hospital for further evaluation and work up of her dehydration. Family stated that they want to monitor her at home for the next few hours and if she does not improve they will bring her to the hospital. I shared my concern that patient is at risk for worsening of her condition and possible death should they postpone ED evaluation - family stated that they understood these risks.     After Visit Information:  Will print and mail AVS     Video-Visit Details    Type of service:  Video Visit    Video End Time (time video stopped): 9:08 AM    Originating Location (pt. Location): Home    Distant Location (provider location):  [facility]     Mode of Communication:  Video Conference via Zattoo      Amanda Mosher MD  I precepted today with Dr. Singer.

## 2020-06-12 NOTE — NURSING NOTE
Chief Complaint   Patient presents with     Sick     vomitting and unable to eat. A little better today with vomitting. Feeling weak and unable to get up much to go to the bathroom or ask for food. Would like to discuss patient's health.      Medication Reconciliation     completed. Needs attention       There were no vitals taken for this visit.- none avail      ~ Dami Rodney CMA ([place])  [facility]  Phone: [phone]

## 2020-06-14 ENCOUNTER — TRANSFERRED RECORDS (OUTPATIENT)
Dept: HEALTH INFORMATION MANAGEMENT | Facility: CLINIC | Age: 85
End: 2020-06-14

## 2020-06-15 ENCOUNTER — DOCUMENTATION ONLY (OUTPATIENT)
Dept: OTHER | Facility: CLINIC | Age: 85
End: 2020-06-15

## 2020-06-18 ENCOUNTER — RECORDS - HEALTHEAST (OUTPATIENT)
Dept: LAB | Facility: CLINIC | Age: 85
End: 2020-06-18

## 2020-06-18 ENCOUNTER — TELEPHONE (OUTPATIENT)
Dept: FAMILY MEDICINE | Facility: CLINIC | Age: 85
End: 2020-06-18

## 2020-06-18 NOTE — TELEPHONE ENCOUNTER
I called to check up on the pt, and help the pt setup a hospital follow up. I talked to the pt daughter in law, she stated that the pt was discharged to a TCU. She is not sure when she will be there for.

## 2020-06-19 LAB
ANION GAP SERPL CALCULATED.3IONS-SCNC: 18 MMOL/L (ref 5–18)
BASOPHILS # BLD AUTO: 0 THOU/UL (ref 0–0.2)
BASOPHILS NFR BLD AUTO: 0 % (ref 0–2)
BUN SERPL-MCNC: 29 MG/DL (ref 8–28)
CALCIUM SERPL-MCNC: 7.6 MG/DL (ref 8.5–10.5)
CHLORIDE BLD-SCNC: 109 MMOL/L (ref 98–107)
CO2 SERPL-SCNC: 10 MMOL/L (ref 22–31)
CREAT SERPL-MCNC: 2.04 MG/DL (ref 0.6–1.1)
EOSINOPHIL # BLD AUTO: 0 THOU/UL (ref 0–0.4)
EOSINOPHIL NFR BLD AUTO: 0 % (ref 0–6)
ERYTHROCYTE [DISTWIDTH] IN BLOOD BY AUTOMATED COUNT: 16 % (ref 11–14.5)
GFR SERPL CREATININE-BSD FRML MDRD: 23 ML/MIN/1.73M2
GLUCOSE BLD-MCNC: 90 MG/DL (ref 70–125)
HCT VFR BLD AUTO: 28.2 % (ref 35–47)
HGB BLD-MCNC: 8.6 G/DL (ref 12–16)
LYMPHOCYTES # BLD AUTO: 0.9 THOU/UL (ref 0.8–4.4)
LYMPHOCYTES NFR BLD AUTO: 10 % (ref 20–40)
MCH RBC QN AUTO: 26.5 PG (ref 27–34)
MCHC RBC AUTO-ENTMCNC: 30.5 G/DL (ref 32–36)
MCV RBC AUTO: 87 FL (ref 80–100)
MONOCYTES # BLD AUTO: 0.7 THOU/UL (ref 0–0.9)
MONOCYTES NFR BLD AUTO: 8 % (ref 2–10)
NEUTROPHILS # BLD AUTO: 7 THOU/UL (ref 2–7.7)
NEUTROPHILS NFR BLD AUTO: 80 % (ref 50–70)
PLATELET # BLD AUTO: 372 THOU/UL (ref 140–440)
PMV BLD AUTO: 10.8 FL (ref 8.5–12.5)
POTASSIUM BLD-SCNC: 3.9 MMOL/L (ref 3.5–5)
RBC # BLD AUTO: 3.24 MILL/UL (ref 3.8–5.4)
SODIUM SERPL-SCNC: 137 MMOL/L (ref 136–145)
WBC: 8.8 THOU/UL (ref 4–11)

## 2020-07-16 ENCOUNTER — TELEPHONE (OUTPATIENT)
Dept: FAMILY MEDICINE | Facility: CLINIC | Age: 85
End: 2020-07-16

## 2020-07-16 NOTE — TELEPHONE ENCOUNTER
[facility] phone call message- general phone call:    Reason for call: Caller would like to know if PCP will follow up with home care.     Return call needed: Yes    OK to leave a message on voice mail? Yes    Primary language: Hmong      needed? Yes    Call taken on July 16, 2020 at 4:02 PM by Alexey Rodney

## 2020-07-17 NOTE — TELEPHONE ENCOUNTER
Called back. Left detailed VM. Okay to follow home care on my end.     Jose M Watson MD  [facility] Resident, PGY3

## 2020-07-21 DIAGNOSIS — I10 ESSENTIAL HYPERTENSION: ICD-10-CM

## 2020-07-21 NOTE — TELEPHONE ENCOUNTER
Message to physician:     Date of last visit: 12/6/2019     Date of next visit if scheduled: Visit date not found       Last Comprehensive Metabolic Panel:  Sodium   Date Value Ref Range Status   12/06/2019 143.0 133.0 - 144.0 mmol/L Final     Potassium   Date Value Ref Range Status   12/06/2019 4.7 3.4 - 5.3 mmol/L Final     Chloride   Date Value Ref Range Status   12/06/2019 113.0 (H) 94.0 - 109.0 mmol/L Final     Carbon Dioxide   Date Value Ref Range Status   12/06/2019 23.0 20.0 - 32.0 mmol/L Final     Glucose   Date Value Ref Range Status   12/06/2019 105.0 60.0 - 109.0 mg/dL Final     Urea Nitrogen   Date Value Ref Range Status   12/06/2019 37.0 (H) 7.0 - 30.0 mg/dL Final     Creatinine   Date Value Ref Range Status   12/06/2019 2.1 (H) 0.6 - 1.3 mg/dL Final     GFR Estimate   Date Value Ref Range Status   06/25/2018 19 >60 ml/min/1.73m2 Corrected     Comment:     Records from [facility], [address] [place] [id number] [phone]     Calcium   Date Value Ref Range Status   12/06/2019 8.6 8.5 - 10.4 mg/dL Final       BP Readings from Last 3 Encounters:   12/06/19 (!) 208/82   10/18/19 120/58   09/27/19 134/79       Lab Results   Component Value Date    A1C 7.0 08/26/2019    A1C 6.0 04/06/2018    A1C 6.8 08/17/2017    A1C 6.5 11/06/2015    A1C 6.8 06/02/2015                Please complete refill and CLOSE ENCOUNTER.  Closing the encounter signifies the refill is complete.

## 2020-07-22 RX ORDER — LISINOPRIL 5 MG/1
5 TABLET ORAL DAILY
Qty: 30 TABLET | Refills: 0 | Status: SHIPPED | OUTPATIENT
Start: 2020-07-22 | End: 2020-08-16

## 2020-07-23 ENCOUNTER — MEDICAL CORRESPONDENCE (OUTPATIENT)
Dept: HEALTH INFORMATION MANAGEMENT | Facility: CLINIC | Age: 85
End: 2020-07-23

## 2020-07-23 ENCOUNTER — OFFICE VISIT (OUTPATIENT)
Dept: FAMILY MEDICINE | Facility: CLINIC | Age: 85
End: 2020-07-23
Payer: COMMERCIAL

## 2020-07-23 VITALS
HEART RATE: 69 BPM | RESPIRATION RATE: 18 BRPM | OXYGEN SATURATION: 98 % | TEMPERATURE: 98.3 F | SYSTOLIC BLOOD PRESSURE: 113 MMHG | DIASTOLIC BLOOD PRESSURE: 68 MMHG

## 2020-07-23 DIAGNOSIS — N18.4 CHRONIC KIDNEY DISEASE, STAGE 4 (SEVERE) (H): ICD-10-CM

## 2020-07-23 DIAGNOSIS — E04.9 GOITER: ICD-10-CM

## 2020-07-23 DIAGNOSIS — D63.1 ANEMIA DUE TO STAGE 4 CHRONIC KIDNEY DISEASE (H): ICD-10-CM

## 2020-07-23 DIAGNOSIS — N18.4 ANEMIA DUE TO STAGE 4 CHRONIC KIDNEY DISEASE (H): ICD-10-CM

## 2020-07-23 DIAGNOSIS — Z86.16 HISTORY OF 2019 NOVEL CORONAVIRUS DISEASE (COVID-19): Primary | ICD-10-CM

## 2020-07-23 DIAGNOSIS — I10 ESSENTIAL HYPERTENSION: ICD-10-CM

## 2020-07-23 DIAGNOSIS — Z71.89 COUNSELING REGARDING ADVANCED DIRECTIVES: ICD-10-CM

## 2020-07-23 LAB
% GRANULOCYTES: 78.4 %G (ref 40–75)
BUN SERPL-MCNC: 45 MG/DL (ref 7–30)
CALCIUM SERPL-MCNC: 8.8 MG/DL (ref 8.5–10.4)
CHLORIDE SERPLBLD-SCNC: 112 MMOL/L (ref 94–109)
CO2 SERPL-SCNC: 17 MMOL/L (ref 20–32)
CREAT SERPL-MCNC: 2.1 MG/DL (ref 0.6–1.3)
EGFR CALCULATED (BLACK REFERENCE): 28.8 ML/MIN
EGFR CALCULATED (NON BLACK REFERENCE): 23.8 ML/MIN
GLUCOSE SERPL-MCNC: 167 MG/DL (ref 60–109)
GRANULOCYTES #: 6 K/UL (ref 1.6–8.3)
HCT VFR BLD AUTO: 26 % (ref 35–47)
HEMOGLOBIN: 7.5 G/DL (ref 11.7–15.7)
LYMPHOCYTES # BLD AUTO: 1.2 K/UL (ref 0.8–5.3)
LYMPHOCYTES NFR BLD AUTO: 15.3 %L (ref 20–48)
MCH RBC QN AUTO: 26 PG (ref 26.5–35)
MCHC RBC AUTO-ENTMCNC: 28.8 G/DL (ref 32–36)
MCV RBC AUTO: 90.4 FL (ref 78–100)
MID #: 0.5 K/UL (ref 0–2.2)
MID %: 6.3 %M (ref 0–20)
PLATELET # BLD AUTO: 329 K/UL (ref 150–450)
POTASSIUM SERPL-SCNC: 4.5 MMOL/L (ref 3.4–5.3)
RBC # BLD AUTO: 2.88 M/UL (ref 3.8–5.2)
SODIUM SERPL-SCNC: 142 MMOL/L (ref 133–144)
TSH SERPL DL<=0.05 MIU/L-ACNC: 0.09 UIU/ML (ref 0.3–5)
WBC # BLD AUTO: 7.7 K/UL (ref 4–11)

## 2020-07-23 NOTE — PROGRESS NOTES
HPI:       Vignesh Fallon is a 85 year old  female who presents for follow up of concern(s) listed below    1. History of 2019 novel coronavirus disease (COVID-19)  Went to [facility] 6/13, presented with fever, nausea, vomiting, myalgias. Did not require O2 in hospital, even after transfer to [place]. Spent 1 month in TCU following hospitalization, out 7/16. Original symptoms were weakness, fatigue. NO SOB, cough. Living with son now. Therapy is coming 3 times per week right now.     2. Essential hypertension  Lisinopril stopped inpatient due to ISAEL. Just on lisinopril 5 mg. Recently restarted and tolerating well per son.     3. Goiter  Noticed inpatient.   - TSH  Sensitive ([facility])    4. Chronic kidney disease, stage 4 (severe) (H)  She did see a nephrologist in the past, but hasn't in 5 years per son. On admission, GFR was 12, and at discharge was 26.  - Basic Metabolic Panel ([facility]) - Results < 1 hr    5. Anemia due to stage 4 chronic kidney disease (H)  Hgb 7.9 on admission, 7.0 on discharge.   - CBC with Diff Plt ([facility])    6. Advanced care planning  -Have not discussed code status or goals      A Solvonicsong  was used for this visit         PMHX:     Patient Active Problem List   Diagnosis     Vitamin D deficiency     Chronic kidney disease, stage 4 (severe) (H)     DM (diabetes mellitus), type 2 with renal complications (H)     Esophageal reflux     Goiter     Essential hypertension     Primary osteoarthritis of both knees     Latent tuberculosis by blood test     Anemia, unspecified type     Moderate episode of recurrent major depressive disorder (H)     Pulmonary nodules       Current Outpatient Medications   Medication Sig Dispense Refill     acetaminophen (TYLENOL) 650 MG CR tablet Take 1 tablet (650 mg) by mouth every 8 hours as needed for mild pain or fever 90 tablet 1     blood glucose (NO BRAND SPECIFIED) lancets standard Use to test blood sugar 4 times daily or as directed. 100 each 3      blood glucose (NO BRAND SPECIFIED) test strip Use to test blood sugar 4 times daily or as directed. 100 each 11     blood glucose monitoring (NO BRAND SPECIFIED) meter device kit Use to test blood sugar 4 times daily or as directed. 1 kit 0     blood glucose monitoring (NO BRAND SPECIFIED) meter device kit Use to test blood sugar up to 3 times daily or as directed. 1 kit 0     lisinopril (ZESTRIL) 5 MG tablet Take 1 tablet (5 mg) by mouth daily 30 tablet 0     order for DME Equipment being ordered: 4 wheeled walker 1 Units 0     multivitamin (ONE-DAILY) tablet Take 1 tablet by mouth         Social History     Socioeconomic History     Marital status:      Spouse name: Not on file     Number of children: Not on file     Years of education: Not on file     Highest education level: Not on file   Occupational History     Not on file   Social Needs     Financial resource strain: Not on file     Food insecurity     Worry: Not on file     Inability: Not on file     Transportation needs     Medical: Not on file     Non-medical: Not on file   Tobacco Use     Smoking status: Never Smoker     Smokeless tobacco: Never Used   Substance and Sexual Activity     Alcohol use: No     Drug use: No     Sexual activity: Not on file   Lifestyle     Physical activity     Days per week: Not on file     Minutes per session: Not on file     Stress: Not on file   Relationships     Social connections     Talks on phone: Not on file     Gets together: Not on file     Attends Sikh service: Not on file     Active member of club or organization: Not on file     Attends meetings of clubs or organizations: Not on file     Relationship status: Not on file     Intimate partner violence     Fear of current or ex partner: Not on file     Emotionally abused: Not on file     Physically abused: Not on file     Forced sexual activity: Not on file   Other Topics Concern     Not on file   Social History Narrative     Not on file           Allergies   Allergen Reactions     No Known Allergies        Results for orders placed or performed in visit on 07/23/20 (from the past 24 hour(s))   CBC with Diff Plt ([facility])   Result Value Ref Range    WBC 7.7 4.0 - 11.0 K/uL    Lymphocytes # 1.2 0.8 - 5.3 K/uL    % Lymphocytes 15.3 (L) 20.0 - 48.0 %L    Mid # 0.5 0.0 - 2.2 K/uL    Mid % 6.3 0.0 - 20.0 %M    GRANULOCYTES # 6.0 1.6 - 8.3 K/uL    % Granulocytes 78.4 (H) 40.0 - 75.0 %G    RBC 2.88 (L) 3.80 - 5.20 M/uL    Hemoglobin 7.5 (LL) 11.7 - 15.7 g/dL    Hematocrit 26.0 (LL) 35.0 - 47.0 %    MCV 90.4 78.0 - 100.0 fL    MCH 26.0 (L) 26.5 - 35.0 pg    MCHC 28.8 (L) 32.0 - 36.0 g/dL    Platelets 329.0 150.0 - 450.0 K/uL    Narrative    Result verified by repeat analysisCritical Value was reported to and read back   by dr calixto  at 7/23/2020 11:35 AM [facility]   Basic Metabolic Panel ([facility]) - Results < 1 hr   Result Value Ref Range    Glucose 167.0 (H) 60.0 - 109.0 mg/dL    Urea Nitrogen 45.0 (H) 7.0 - 30.0 mg/dL    Creatinine 2.1 (H) 0.6 - 1.3 mg/dL    Sodium 142.0 133.0 - 144.0 mmol/L    Potassium 4.5 3.4 - 5.3 mmol/L    Chloride 112.0 (H) 94.0 - 109.0 mmol/L    Carbon Dioxide 17.0 (L) 20.0 - 32.0 mmol/L    Calcium 8.8 8.5 - 10.4 mg/dL    eGFR Calculated (Non Black Reference) 23.8 (L) >60.0 mL/min    eGFR Calculated (Black Reference) 28.8 (L) >60.0 mL/min            Review of Systems:     A 10 point review of systems including constitutional, cardiovascular, respiratory, HEENT, GI, , neurological, MSK, skin, endocrine, is negative unless stated in HPI          Physical Exam:     Vitals:    07/23/20 1055   BP: 113/68   Pulse: 69   Resp: 18   Temp: 98.3  F (36.8  C)   TempSrc: Oral   SpO2: 98%     There is no height or weight on file to calculate BMI.    GENERAL APPEARANCE: healthy, alert and no distress,  NECK: no adenopathy, no asymmetry, masses, symmetric thyroid goiter  RESP: lungs clear to auscultation - no rales, rhonchi or wheezes  CV: regular rate and  rhythm,  and no murmur, click,  rub or gallop  MS: extremities normal- no gross deformities noted  SKIN: no suspicious lesions or rashes  NEURO: Normal strength and tone, sensory exam grossly normal, mentation appears intact and speech normal  PSYCH: mood and affect normal/bright    Assessment and Plan     1. History of 2019 novel coronavirus disease (COVID-19)  Doing well at this point in time. Likely some deconditioning still present. Consider home therapeis.   - CBC with Diff Plt ([facility])    2. Essential hypertension  Just on lisinopril 5 mg. BMP shows baseline GFR, so she can continue lisinopril as is.   - Basic Metabolic Panel ([facility]) - Results < 1 hr    3. Goiter  Last TSH 0.18. No Free T4 that I can find.   - TSH  Sensitive ([facility])    4. Chronic kidney disease, stage 4 (severe) (H)  At baseline GFR today. Needs to be plugged back in with nephro, consult placed today.  - Basic Metabolic Panel ([facility]) - Results < 1 hr  - NEPHROLOGY ADULT REFERRAL; Future    5. Anemia due to stage 4 chronic kidney disease (H)  Hgb stable at 7.5 today.   - CBC with Diff Plt ([facility])    6. Advanced Care Planning  Discussed code status and patient and son are unsure, but son states they would like to discuss. Given ACP documents today.     Options for treatment and follow-up care were reviewed with the patient and/or guardian. Vignesh Leeanne and/or guardian engaged in the decision making process and verbalized understanding of the options discussed and agreed with the final plan.    Joes M Watson MD  [facility] Resident, PGY3      Precepted today with: Dr. Patel.     Preceptor Attestation:   Patient seen, evaluated and discussed with the resident. I have verified the content of the note, which accurately reflects my assessment of the patient and the plan of care.  Supervising Physician:Adeel Patel MD  [facility]

## 2020-07-23 NOTE — LETTER
July 24, 2020      Vignesh Fallon  [address] [place] [address]        Dear ,    We are writing to inform you of your test results.    labs are stable, at her baseline. They can continue the lisinopril.     Resulted Orders   CBC with Diff Plt ([facility])   Result Value Ref Range    WBC 7.7 4.0 - 11.0 K/uL    Lymphocytes # 1.2 0.8 - 5.3 K/uL    % Lymphocytes 15.3 (L) 20.0 - 48.0 %L    Mid # 0.5 0.0 - 2.2 K/uL    Mid % 6.3 0.0 - 20.0 %M    GRANULOCYTES # 6.0 1.6 - 8.3 K/uL    % Granulocytes 78.4 (H) 40.0 - 75.0 %G    RBC 2.88 (L) 3.80 - 5.20 M/uL    Hemoglobin 7.5 (LL) 11.7 - 15.7 g/dL    Hematocrit 26.0 (LL) 35.0 - 47.0 %    MCV 90.4 78.0 - 100.0 fL    MCH 26.0 (L) 26.5 - 35.0 pg    MCHC 28.8 (L) 32.0 - 36.0 g/dL    Platelets 329.0 150.0 - 450.0 K/uL    Narrative    Result verified by repeat analysisCritical Value was reported to and read back   by dr calixto  at 7/23/2020 11:35 AM [facility]   Basic Metabolic Panel ([facility]) - Results < 1 hr   Result Value Ref Range    Glucose 167.0 (H) 60.0 - 109.0 mg/dL    Urea Nitrogen 45.0 (H) 7.0 - 30.0 mg/dL    Creatinine 2.1 (H) 0.6 - 1.3 mg/dL    Sodium 142.0 133.0 - 144.0 mmol/L    Potassium 4.5 3.4 - 5.3 mmol/L    Chloride 112.0 (H) 94.0 - 109.0 mmol/L    Carbon Dioxide 17.0 (L) 20.0 - 32.0 mmol/L    Calcium 8.8 8.5 - 10.4 mg/dL    eGFR Calculated (Non Black Reference) 23.8 (L) >60.0 mL/min    eGFR Calculated (Black Reference) 28.8 (L) >60.0 mL/min       If you have any questions or concerns, please call the clinic at the number listed above.       Sincerely,        Jose M Calixto MD

## 2020-07-27 ENCOUNTER — MEDICAL CORRESPONDENCE (OUTPATIENT)
Dept: HEALTH INFORMATION MANAGEMENT | Facility: CLINIC | Age: 85
End: 2020-07-27

## 2020-07-27 LAB — T4 TOTAL: 3.1 UG/DL (ref 4.5–13)

## 2020-07-28 ENCOUNTER — COMMUNICATION - HEALTHEAST (OUTPATIENT)
Dept: FAMILY MEDICINE | Facility: CLINIC | Age: 85
End: 2020-07-28

## 2020-07-28 DIAGNOSIS — E04.9 GOITER: ICD-10-CM

## 2020-07-28 DIAGNOSIS — E04.9 GOITER: Primary | ICD-10-CM

## 2020-07-28 LAB — T4 FREE SERPL-MCNC: 0.6 NG/DL (ref 0.7–1.8)

## 2020-07-29 ENCOUNTER — COMMUNICATION - HEALTHEAST (OUTPATIENT)
Dept: FAMILY MEDICINE | Facility: CLINIC | Age: 85
End: 2020-07-29

## 2020-07-29 DIAGNOSIS — E04.9 GOITER: ICD-10-CM

## 2020-07-29 LAB — T3, TOTAL - QUEST: 80 NG/DL (ref 45–175)

## 2020-07-31 ENCOUNTER — MEDICAL CORRESPONDENCE (OUTPATIENT)
Dept: HEALTH INFORMATION MANAGEMENT | Facility: CLINIC | Age: 85
End: 2020-07-31

## 2020-08-06 NOTE — PATIENT INSTRUCTIONS
Referral for :     Nephrology    LOCATION/PLACE/Provider :    [facility]   DATE & TIME :    Faxed referral and notes to location.   PHONE :     [phone]  FAX :    [phone]  Appointment made by clinic staff/:    Kelsey

## 2020-08-12 ENCOUNTER — MEDICAL CORRESPONDENCE (OUTPATIENT)
Dept: HEALTH INFORMATION MANAGEMENT | Facility: CLINIC | Age: 85
End: 2020-08-12

## 2020-08-14 DIAGNOSIS — I10 ESSENTIAL HYPERTENSION: ICD-10-CM

## 2020-08-16 RX ORDER — LISINOPRIL 5 MG/1
5 TABLET ORAL DAILY
Qty: 30 TABLET | Refills: 0 | Status: SHIPPED | OUTPATIENT
Start: 2020-08-16

## 2020-08-18 ENCOUNTER — MEDICAL CORRESPONDENCE (OUTPATIENT)
Dept: HEALTH INFORMATION MANAGEMENT | Facility: CLINIC | Age: 85
End: 2020-08-18

## 2020-09-14 ENCOUNTER — TELEPHONE (OUTPATIENT)
Dept: FAMILY MEDICINE | Facility: CLINIC | Age: 85
End: 2020-09-14

## 2020-09-14 DIAGNOSIS — R32 URINARY INCONTINENCE, UNSPECIFIED TYPE: Primary | ICD-10-CM

## 2020-09-14 NOTE — TELEPHONE ENCOUNTER
[facility] phone call message - order or referral request for patient:     Order or referral being requested: order diapers for Patient      Additional Comments: Son is calling to get more diapers for Patient, he is not sure what size but probably large and that it should be on her file as in the past she had got some. He states in the past it was sent to [facility] pharmacy on [facility]. Please call and advise.     OK to leave a message on voice mail?     Primary language: Hmong      needed? Yes    Call taken on September 14, 2020 at 12:40 PM by Rafiq Scott

## 2020-09-14 NOTE — TELEPHONE ENCOUNTER
In media was a previous [employer] order for x-small briefs for patient in 2018. Will route to PCP, Dr Jose M Watson. Thank you.

## 2020-09-14 NOTE — TELEPHONE ENCOUNTER
Need to obtain size of incontinence brief for patient, per records patient height and weight does not correlate to large incontinence brief. No noted prescriptions from the past to indicate size. Edda XIONG

## 2020-09-14 NOTE — PROGRESS NOTES
DME (Durable Medical Equipment) Orders and Documentation  Orders Placed This Encounter   Procedures     Incontinence Supplies Order      The patient was assessed and it was determined the patient is in need of the following listed DME Supplies/Equipment. Please complete supporting documentation below to demonstrate medical necessity.      Incontinence Supplies Documentation  Incontinence supplies are needed due to urinary incontinence.

## 2020-09-15 DIAGNOSIS — R32 URINARY INCONTINENCE, UNSPECIFIED TYPE: Primary | ICD-10-CM

## 2020-09-15 NOTE — PROGRESS NOTES
DME (Durable Medical Equipment) Orders and Documentation  Orders Placed This Encounter   Procedures     Incontinence Supplies Order      The patient was assessed and it was determined the patient is in need of the following listed DME Supplies/Equipment. Please complete supporting documentation below to demonstrate medical necessity.      Incontinence Supplies Documentation  Incontinence supplies are needed due to incontinence.

## 2020-09-21 ENCOUNTER — MEDICAL CORRESPONDENCE (OUTPATIENT)
Dept: HEALTH INFORMATION MANAGEMENT | Facility: CLINIC | Age: 85
End: 2020-09-21

## 2020-09-23 NOTE — TELEPHONE ENCOUNTER
Son states he is wondering if diaper order will be delivered to home or if he has to  from facility. Caller states it's easier for him to have it delivered. Please call and advise.

## 2020-09-25 ENCOUNTER — TELEPHONE (OUTPATIENT)
Dept: FAMILY MEDICINE | Facility: CLINIC | Age: 85
End: 2020-09-25

## 2020-09-25 NOTE — TELEPHONE ENCOUNTER
[facility] phone call message- general phone call:    Reason for call: Caller state patient may be part of the TB senior outbreak and would like to get more information regarding patient previous visit history. Caller was advised ANCELMO is needed if there is not one on file but caller advise still wanting a call back from a nurse.    Return call needed: Yes    OK to leave a message on voice mail? Yes    Primary language: Hmong      needed? Yes    Call taken on September 25, 2020 at 2:09 PM by Alexey Rodney

## 2020-09-25 NOTE — TELEPHONE ENCOUNTER
Called Annie to further discuss. Concern for no f/u CXR. Did note improvement of symptoms. Request from TB clinic for f/u CXR and office notes faxed. Faxed office note to ATTN:Annie and adeline route encounter to  to schedule patient for office visit r/t need for CXR. Edda XIONG

## 2020-09-28 NOTE — TELEPHONE ENCOUNTER
Called [facility] and spoke with Lakeshia this morning. They do have the patient's orders but will have someone run it thru insurance if will be covered. They will reach out to the patient to let them know what outcome is.   Called the son/family and informed him this afternoon. They've not heard anything yet, but number was given to him to follow-up.       EMILIE Souza CMA ([place])   [facility] [address] [place] [address]  [phone]

## 2020-09-28 NOTE — TELEPHONE ENCOUNTER
Caller wanted to know where the order is at and whether it would be delivered. I spoke to EMILIE Dallas and she informed [facility] supplies should all be delivered.    Blue team, please reach out to [facility] to see where they're at with this order. thanks

## 2020-10-02 NOTE — TELEPHONE ENCOUNTER
Called using language line, no answer, left voicemail to return call. Needs to schedule office visit for CXR.

## 2020-10-23 NOTE — TELEPHONE ENCOUNTER
Hlea from [facility] called and stated she was able to get a hold of patient's son. Son wants patient to have CXR and symptom screen done at [facility]. Hlea advised to await phone call from son. Will need to be scheduled with PCP for TB visit (symptom screen and CXR). Edda XIONG

## 2020-10-28 ENCOUNTER — TELEPHONE (OUTPATIENT)
Dept: FAMILY MEDICINE | Facility: CLINIC | Age: 85
End: 2020-10-28

## 2020-10-28 DIAGNOSIS — Z22.7 LATENT TUBERCULOSIS BY BLOOD TEST: Primary | ICD-10-CM

## 2020-10-28 NOTE — TELEPHONE ENCOUNTER
Placing CXR for TB follow up.    Jose M Watson MD  [facility] Resident, PGY3

## 2020-10-28 NOTE — TELEPHONE ENCOUNTER
[facility] phone call message- general phone call:    Reason for call: Caller would like to schedule a Chest Xray for TB scan but per Jarocho there is no order on file. Caller states their doctor has an order and wants to compare the previous Chest Xray with a current one so would like to see if PCP would put in a order for TB chest xray in clinic as caller spoke to patient son who indicate only wanting to come to [facility] for xray. Please call and advise.     Return call needed: Yes    OK to leave a message on voice mail? Yes    Primary language: ong      needed? Yes    Call taken on October 28, 2020 at 3:36 PM by Alexey Rodney

## 2020-11-02 ENCOUNTER — ALLIED HEALTH/NURSE VISIT (OUTPATIENT)
Dept: FAMILY MEDICINE | Facility: CLINIC | Age: 85
End: 2020-11-02
Payer: COMMERCIAL

## 2020-11-02 ENCOUNTER — ANCILLARY PROCEDURE (OUTPATIENT)
Dept: GENERAL RADIOLOGY | Facility: CLINIC | Age: 85
End: 2020-11-02
Attending: FAMILY MEDICINE
Payer: COMMERCIAL

## 2020-11-02 DIAGNOSIS — Z22.7 LATENT TUBERCULOSIS BY BLOOD TEST: Primary | ICD-10-CM

## 2020-11-02 DIAGNOSIS — Z22.7 LATENT TUBERCULOSIS BY BLOOD TEST: ICD-10-CM

## 2020-11-02 PROCEDURE — 71046 X-RAY EXAM CHEST 2 VIEWS: CPT | Mod: FY | Performed by: RADIOLOGY

## 2020-11-02 PROCEDURE — 99207 PR NO BILLABLE SERVICE THIS VISIT: CPT

## 2020-11-23 ENCOUNTER — MEDICAL CORRESPONDENCE (OUTPATIENT)
Dept: HEALTH INFORMATION MANAGEMENT | Facility: CLINIC | Age: 85
End: 2020-11-23

## 2020-12-04 ENCOUNTER — MEDICAL CORRESPONDENCE (OUTPATIENT)
Dept: HEALTH INFORMATION MANAGEMENT | Facility: CLINIC | Age: 85
End: 2020-12-04

## 2020-12-09 ENCOUNTER — MEDICAL CORRESPONDENCE (OUTPATIENT)
Dept: HEALTH INFORMATION MANAGEMENT | Facility: CLINIC | Age: 85
End: 2020-12-09

## 2021-01-01 ENCOUNTER — RECORDS - HEALTHEAST (OUTPATIENT)
Dept: ADMINISTRATIVE | Facility: CLINIC | Age: 86
End: 2021-01-01

## 2021-01-01 ENCOUNTER — AMBULATORY - HEALTHEAST (OUTPATIENT)
Dept: NURSING | Facility: CLINIC | Age: 86
End: 2021-01-01

## 2021-01-01 ENCOUNTER — HOSPITAL ENCOUNTER (OUTPATIENT)
Dept: RADIOLOGY | Facility: HOSPITAL | Age: 86
Discharge: HOME | End: 2021-08-09
Attending: FAMILY MEDICINE | Admitting: FAMILY MEDICINE
Payer: COMMERCIAL

## 2021-01-01 ENCOUNTER — OFFICE VISIT (OUTPATIENT)
Dept: FAMILY MEDICINE | Facility: CLINIC | Age: 86
End: 2021-01-01
Payer: COMMERCIAL

## 2021-01-01 ENCOUNTER — DOCUMENTATION ONLY (OUTPATIENT)
Dept: FAMILY MEDICINE | Facility: CLINIC | Age: 86
End: 2021-01-01

## 2021-01-01 ENCOUNTER — TELEPHONE (OUTPATIENT)
Dept: FAMILY MEDICINE | Facility: CLINIC | Age: 86
End: 2021-01-01

## 2021-01-01 VITALS
HEART RATE: 65 BPM | DIASTOLIC BLOOD PRESSURE: 85 MMHG | SYSTOLIC BLOOD PRESSURE: 122 MMHG | OXYGEN SATURATION: 100 % | RESPIRATION RATE: 16 BRPM

## 2021-01-01 DIAGNOSIS — H90.3 BILATERAL SENSORINEURAL HEARING LOSS: Primary | ICD-10-CM

## 2021-01-01 DIAGNOSIS — F33.1 MODERATE EPISODE OF RECURRENT MAJOR DEPRESSIVE DISORDER (H): ICD-10-CM

## 2021-01-01 DIAGNOSIS — E11.29 TYPE 2 DIABETES MELLITUS WITH OTHER DIABETIC KIDNEY COMPLICATION, WITHOUT LONG-TERM CURRENT USE OF INSULIN (H): ICD-10-CM

## 2021-01-01 DIAGNOSIS — M25.551 HIP PAIN, RIGHT: ICD-10-CM

## 2021-01-01 DIAGNOSIS — N18.4 CHRONIC KIDNEY DISEASE, STAGE 4 (SEVERE) (H): ICD-10-CM

## 2021-01-01 DIAGNOSIS — M25.551 HIP PAIN, RIGHT: Primary | ICD-10-CM

## 2021-01-01 PROCEDURE — 99214 OFFICE O/P EST MOD 30 MIN: CPT | Mod: GC | Performed by: STUDENT IN AN ORGANIZED HEALTH CARE EDUCATION/TRAINING PROGRAM

## 2021-01-01 PROCEDURE — 73502 X-RAY EXAM HIP UNI 2-3 VIEWS: CPT

## 2021-01-01 RX ORDER — ERGOCALCIFEROL 1.25 MG/1
CAPSULE ORAL
COMMUNITY
End: 2022-01-01

## 2021-01-01 RX ORDER — AMLODIPINE BESYLATE 5 MG/1
7.5 TABLET ORAL
COMMUNITY
Start: 2020-06-18

## 2021-01-01 RX ORDER — LOPERAMIDE HCL 2 MG
2-4 CAPSULE ORAL
COMMUNITY
Start: 2020-06-17 | End: 2022-01-01

## 2021-02-19 ENCOUNTER — TELEPHONE (OUTPATIENT)
Dept: FAMILY MEDICINE | Facility: CLINIC | Age: 86
End: 2021-02-19

## 2021-02-19 NOTE — TELEPHONE ENCOUNTER
Left a detailed message: reaching out to our 75+ patients to inform them of the opportunity and schedule Vignesh for COVID19 vaccination at our [facility] for 2/20/2021. Son to call clinic back. Iza XIONG

## 2021-03-12 ENCOUNTER — TELEPHONE (OUTPATIENT)
Dept: FAMILY MEDICINE | Facility: CLINIC | Age: 86
End: 2021-03-12

## 2021-04-26 NOTE — PROGRESS NOTES
"Telephonic assessment completed with client for an annual health risk assessment and PCA reassessment. Client s daughter in law/responsible party, Shelbie Lorenzo, and her son, Ryland, were also present. An  was not needed. Assessment completed telephonically due to current COVID 19.    Current situation/living environment  Vignesh continues to live with her son (Ryland) and DIL/RP (Shelbie) in a single family home.    Activities of daily living (ADL)/instrumental activities of daily living (IADL) and functional issues  Client needs help with the following ADL's: dressing, grooming, bathing, eating, transfers, positioning, walking and toileting  Client needs help with the following IADL's: shopping, cooking, housekeeping, laundry, managing finances/bills and transportation  Client states she is unable to perform the above due to back pain, limited ROM of arms, falls risk, overall weakness and frailty, and dependence on w/c.    Health concerns for today  Vignesh has had a significant change in health since last assessment. She was hospitalized in mid-June 2020 after tonie COVID-19 and subsequently transferred to TCU where she stayed until 7/20/20 before discharging back to home. Since then, Vignesh has become significantly weaker, more forgetful, easily fatigued, and more generally frail. She is now dependent on wheelchair at all times and barely able to bear own weight for more than a couple minutes. Remains at high risk for falls but denies any recently. No ED visits in the last year. Continues to control via diet only. No longer checks blood sugars. No changes to poor hearing. Must shout for Vignesh to hear be able to hear. Again, would like to have audiology exam completed to see if she can get hearing aids. CC to assist. C/o pain in back, hands & fingers, and general aches throughout. \"I can t move anymore.\" Back pain is constant. \"I can barely move in bed because it is so uncomfortable and painful.\" Rates pain at 10. " States Tylenol helps with pain a bit. Has talked to PCP in the past about pain but previous treatments were not helpful. RP reports clt no longer takes any meds other than Tylenol PRN for pain. Denies need for med goal. Ryland reports that he and Vignesh would be open to placing Vignesh in Adult Foster Home if cares become too much that family can no longer handle even with PCA and Hmk in place. Will notify CC if this should become the case. Coby also reports changing her mind about getting the COVID19 vaccine. She is now interested in getting it but would need a service that would provide vaccines to homebound patients.   Has patient fallen 2 or more times in the last year? No  Has patient fallen with injury in the last year? No      Cognition/mental health  H/o being verbally aggressive toward caregivers multiple times per day. RP reports aggression has increased in frequency and severity. Continues to refuse cares daily. Family has been helping her manage with this for many years. They often will re-attempt cares at later times and just allow Vignesh to calm down. Also h/o Major Depression but has refused MH services in past. ADC services have helped her to manage Depression & she is open to restarting if her health will allow and if there is w/c accessible transportation for her.     STARS/Med Adherence  Client is non-compliant with the following quality measures: Not on STARs report  Comments: NA    Client's Plan of Care consists of:  CC to look into Adult day care options  Personal care assistance (PCA) (6.25 hours per day)  Extended PCA (1.75 hr/day)  Homemaking Services (2.25 hrs/week)  CC to assist with scheduling for an audiology exam and to obtain covid19 vaccine.  Follow-up in 6 months or PRN    Brady Freeman RN PHN  [facility] Managed Care Coordinator  [phone]

## 2021-05-03 NOTE — PATIENT INSTRUCTIONS
Referral for :     Audiology    LOCATION/PLACE/Provider :    [facility]   DATE & TIME :    Faxed   PHONE :     [phone]  FAX :    [phone]  Appointment made by clinic staff/:    Kelsey

## 2021-05-03 NOTE — TELEPHONE ENCOUNTER
[facility] phone call message- patient requesting form status:    Type of Form: 04/29/2021    Given to: Faxed to MR    Submitted: within 10 business days     Date Submitted:      Date Needed by:     Additional Comments: Called, kaia on  regarding [employer] form, per Dr. Watson wanted me to call patient to ask what condition required one of the what following -assisted services:    Stretcher  Wheelchair  Ambulatory with physical assitance  Ambulatory no physical assistance      Permanent impariment/disability: Yes or No?    If Son calls back ask those questions or give call to me.     OK to leave a message on voice mail?     Primary language: Hmong      needed? Yes    Call taken on May 3, 2021 at 4:30 PM by Rafiq Scott

## 2021-05-04 NOTE — TELEPHONE ENCOUNTER
Son return call, stating that Patient is on wheelchair at all times, she doesn't use the cane or walker, her legs are weak so once she is up she is on the wheelchair but can still stand to use the bathroom once they take her on the wheelchair to the bathroom.

## 2021-06-10 NOTE — TELEPHONE ENCOUNTER
Ordering free T4    Jose M Watson MD  [facility] Medicine Resident, PGY3  Pager# [phone]

## 2021-06-10 NOTE — TELEPHONE ENCOUNTER
Called to add on serum T3 given low free T4.    Jose M Watson MD  [facility] Medicine Resident, PGY3  Pager# [phone]

## 2021-06-10 NOTE — TELEPHONE ENCOUNTER
I am setting appointment with patient to discuss results    Jose M Watson MD  [facility] Medicine Resident, PGY3  Pager# [phone]

## 2021-08-09 NOTE — PROGRESS NOTES
Assessment and Plan   (M25.551) Hip pain, right  (primary encounter diagnosis)  Comment: Given the worsening pain over the last month, urinary incontinence, BP of 185/81 and deconditioning with her daily activities, hip fracture can't be ruled out. As there is no x-ray today in clinic, we will send her to [facility] for imaging and possible further work up.   Plan: XR Hip Right 2-3 Views    (F33.1) Moderate episode of recurrent major depressive disorder (H)  Comment: Patient is sad and tearful in clinic today but given her hip pain, deferred discussion to a future visit.     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 Last A1c was 5.4 in 6/2020    (N18.4) Chronic kidney disease, stage 4 (severe) (H)  Comment: Blood pressure was 185/81 today in clinic. This could be secondary to her pain but further work up could be warranted in a next visit.      Options for treatment and follow-up care were reviewed with the patient and/or guardian. Vignesh Fallon and/or guardian engaged in the decision making process and verbalized understanding of the options discussed and agreed with the final plan.    Nick Mayer  Medical Student, MS3    Resident/Fellow Attestation   I, Trav Le, was present with the medical/KIRK student who participated in the service and in the documentation of the note.  I have verified the history and personally performed the physical exam and medical decision making.  I agree with the assessment and plan of care as documented in the note.      Shan Le MD  [facility], PGY-3    Precepted patient with Dr. Anamaria Sanchez       HPI:   Vignesh Fallon is a 86 year old female with a complex medical history including CKD stage 4, T2DM,  "osteoarthritis, hypertension, and latent TB who presents to clinic today for   Chief Complaint   Patient presents with     Musculoskeletal Problem     muscle pain     -One month of inner right groin pain with radiation to her right arm and shoulder; worse compared to previous symptoms  -Unable to sit down and stand up, struggling with daily activities   -Describes this as an \"excruciating stabbing\" pain  -Right shoulder pain and right arm feels mostly like joint pain  -Son states she is having back pain as well at home  -Unable to go to the toilet as she has too much pain  -Two months of urinary incontinence, once per day usually at night. Does wear medium diapers at home.   -No medications, herbal therapies, supplements.  -No illness at home, fevers, chills, numbness, tingling, new weakness, urinary symptoms, recent trauma or injury.     A EXPO  was used for this visit         Review of Systems:     10 point ROS negative except as noted in HPI.         PMHX:   Active Problems List  Patient Active Problem List   Diagnosis     Vitamin D deficiency     Chronic kidney disease, stage 4 (severe) (H)     DM (diabetes mellitus), type 2 with renal complications (H)     Esophageal reflux     Goiter     Essential hypertension     Primary osteoarthritis of both knees     Latent tuberculosis by blood test     Anemia, unspecified type     Moderate episode of recurrent major depressive disorder (H)     Pulmonary nodules     Moderate protein-calorie malnutrition (H)     Pneumonia due to COVID-19 virus     Active problem list reviewed and updated.    Current Medications  Current Outpatient Medications   Medication Sig Dispense Refill     amLODIPine (NORVASC) 5 MG tablet Take 7.5 mg by mouth       loperamide (IMODIUM) 2 MG capsule Take 2-4 mg by mouth       acetaminophen (TYLENOL) 650 MG CR tablet Take 1 tablet (650 mg) by mouth every 8 hours as needed for mild pain or fever 90 tablet 1     blood glucose (NO BRAND " SPECIFIED) lancets standard Use to test blood sugar 4 times daily or as directed. 100 each 3     blood glucose (NO BRAND SPECIFIED) test strip Use to test blood sugar 4 times daily or as directed. 100 each 11     blood glucose monitoring (NO BRAND SPECIFIED) meter device kit Use to test blood sugar 4 times daily or as directed. 1 kit 0     blood glucose monitoring (NO BRAND SPECIFIED) meter device kit Use to test blood sugar up to 3 times daily or as directed. 1 kit 0     ergocalciferol (ERGOCALCIFEROL) 1.25 MG (01576 UT) capsule        lisinopril (ZESTRIL) 5 MG tablet Take 1 tablet (5 mg) by mouth daily 30 tablet 0     multivitamin (ONE-DAILY) tablet Take 1 tablet by mouth       order for DME Equipment being ordered: 4 wheeled walker 1 Units 0     Medication list was not reviewed and updated.    Social History  Social History     Tobacco Use     Smoking status: Never Smoker     Smokeless tobacco: Never Used   Substance Use Topics     Alcohol use: No     Drug use: No     History   Drug Use No       Family History  Family History   Problem Relation Age of Onset     Cancer No family hx of      Diabetes No family hx of      Coronary Artery Disease No family hx of      Heart Disease No family hx of      Hypertension No family hx of        Allergies  Allergies   Allergen Reactions     No Known Allergies             Physical Exam:     Vitals:    08/09/21 1000 08/09/21 1137   BP: (!) 185/81 122/85   Pulse: 65    Resp: 16    SpO2: 100%      There is no height or weight on file to calculate BMI.    GENERAL APPEARANCE: alert, appears stated age, in mild distress seated in a wheelchair   HEENT: Eyes grossly normal to inspection, nares normal, MMM  MSK: Unable to perform given risk of hip fracture  SKIN: no suspicious lesions or rashes   NEURO: mentation appears intact and speech normal  PSYCH: mood sad and tearful; affect congruent with mood

## 2021-08-11 PROBLEM — E44.0 MODERATE PROTEIN-CALORIE MALNUTRITION (H): Status: ACTIVE | Noted: 2020-06-16

## 2021-08-11 PROBLEM — U07.1 PNEUMONIA DUE TO COVID-19 VIRUS: Status: ACTIVE | Noted: 2020-06-14

## 2021-08-11 PROBLEM — J12.82 PNEUMONIA DUE TO COVID-19 VIRUS: Status: ACTIVE | Noted: 2020-06-14

## 2021-08-16 NOTE — PROGRESS NOTES
Preceptor Attestation:  Patient's case reviewed and discussed with the resident, Trav Le MD, and I personally evaluated the patient. I agree with written assessment and plan of care.    Supervising Physician:  Anamaria Sanchez MD   [facility]

## 2022-01-01 ENCOUNTER — NURSE TRIAGE (OUTPATIENT)
Dept: FAMILY MEDICINE | Facility: CLINIC | Age: 87
End: 2022-01-01
Payer: COMMERCIAL

## 2022-01-01 ENCOUNTER — HOSPITAL ENCOUNTER (INPATIENT)
Facility: HOSPITAL | Age: 87
LOS: 4 days | DRG: 682 | End: 2022-04-04
Attending: EMERGENCY MEDICINE | Admitting: FAMILY MEDICINE
Payer: COMMERCIAL

## 2022-01-01 ENCOUNTER — APPOINTMENT (OUTPATIENT)
Dept: RADIOLOGY | Facility: HOSPITAL | Age: 87
DRG: 682 | End: 2022-01-01
Attending: EMERGENCY MEDICINE
Payer: COMMERCIAL

## 2022-01-01 VITALS
SYSTOLIC BLOOD PRESSURE: 98 MMHG | DIASTOLIC BLOOD PRESSURE: 58 MMHG | OXYGEN SATURATION: 98 % | TEMPERATURE: 97.3 F | BODY MASS INDEX: 15.99 KG/M2 | HEIGHT: 57 IN | WEIGHT: 74.1 LBS | HEART RATE: 68 BPM | RESPIRATION RATE: 24 BRPM

## 2022-01-01 DIAGNOSIS — Z66 DNR (DO NOT RESUSCITATE): ICD-10-CM

## 2022-01-01 DIAGNOSIS — N17.9 ACUTE RENAL FAILURE, UNSPECIFIED ACUTE RENAL FAILURE TYPE (H): ICD-10-CM

## 2022-01-01 DIAGNOSIS — E87.20 METABOLIC ACIDOSIS: ICD-10-CM

## 2022-01-01 DIAGNOSIS — E87.6 HYPOKALEMIA: ICD-10-CM

## 2022-01-01 DIAGNOSIS — E83.51 HYPOCALCEMIA: ICD-10-CM

## 2022-01-01 DIAGNOSIS — E83.42 HYPOMAGNESEMIA: ICD-10-CM

## 2022-01-01 LAB
ALBUMIN SERPL-MCNC: 3.2 G/DL (ref 3.5–5)
ALBUMIN SERPL-MCNC: 3.6 G/DL (ref 3.5–5)
ALBUMIN UR-MCNC: 100 MG/DL
ALP SERPL-CCNC: 441 U/L (ref 45–120)
ALP SERPL-CCNC: 492 U/L (ref 45–120)
ALT SERPL W P-5'-P-CCNC: <9 U/L (ref 0–45)
ALT SERPL W P-5'-P-CCNC: <9 U/L (ref 0–45)
ANION GAP SERPL CALCULATED.3IONS-SCNC: 14 MMOL/L (ref 5–18)
ANION GAP SERPL CALCULATED.3IONS-SCNC: 15 MMOL/L (ref 5–18)
APPEARANCE UR: CLEAR
AST SERPL W P-5'-P-CCNC: 12 U/L (ref 0–40)
AST SERPL W P-5'-P-CCNC: 14 U/L (ref 0–40)
ATRIAL RATE - MUSE: 99 BPM
BACTERIA #/AREA URNS HPF: ABNORMAL /HPF
BASE EXCESS BLDV CALC-SCNC: -19.4 MMOL/L
BASOPHILS # BLD AUTO: 0 10E3/UL (ref 0–0.2)
BASOPHILS NFR BLD AUTO: 0 %
BILIRUB SERPL-MCNC: 0.5 MG/DL (ref 0–1)
BILIRUB SERPL-MCNC: 0.5 MG/DL (ref 0–1)
BILIRUB UR QL STRIP: NEGATIVE
BNP SERPL-MCNC: 545 PG/ML (ref 0–167)
BUN SERPL-MCNC: 64 MG/DL (ref 8–28)
BUN SERPL-MCNC: 66 MG/DL (ref 8–28)
CALCIUM SERPL-MCNC: 5.7 MG/DL (ref 8.5–10.5)
CALCIUM SERPL-MCNC: 5.7 MG/DL (ref 8.5–10.5)
CHLORIDE BLD-SCNC: 115 MMOL/L (ref 98–107)
CHLORIDE BLD-SCNC: 119 MMOL/L (ref 98–107)
CO2 SERPL-SCNC: 9 MMOL/L (ref 22–31)
CO2 SERPL-SCNC: 9 MMOL/L (ref 22–31)
COLOR UR AUTO: ABNORMAL
CREAT SERPL-MCNC: 5.79 MG/DL (ref 0.6–1.1)
CREAT SERPL-MCNC: 6.14 MG/DL (ref 0.6–1.1)
DIASTOLIC BLOOD PRESSURE - MUSE: 117 MMHG
EOSINOPHIL # BLD AUTO: 0 10E3/UL (ref 0–0.7)
EOSINOPHIL NFR BLD AUTO: 0 %
ERYTHROCYTE [DISTWIDTH] IN BLOOD BY AUTOMATED COUNT: 16.7 % (ref 10–15)
ERYTHROCYTE [DISTWIDTH] IN BLOOD BY AUTOMATED COUNT: 16.7 % (ref 10–15)
ERYTHROCYTE [DISTWIDTH] IN BLOOD BY AUTOMATED COUNT: 16.8 % (ref 10–15)
GFR SERPL CREATININE-BSD FRML MDRD: 6 ML/MIN/1.73M2
GFR SERPL CREATININE-BSD FRML MDRD: 7 ML/MIN/1.73M2
GLUCOSE BLD-MCNC: 131 MG/DL (ref 70–125)
GLUCOSE BLD-MCNC: 202 MG/DL (ref 70–125)
GLUCOSE UR STRIP-MCNC: 70 MG/DL
HCO3 BLDV-SCNC: 11 MMOL/L (ref 24–30)
HCT VFR BLD AUTO: 19.9 % (ref 35–47)
HCT VFR BLD AUTO: 22.4 % (ref 35–47)
HCT VFR BLD AUTO: 24.5 % (ref 35–47)
HGB BLD-MCNC: 6.5 G/DL (ref 11.7–15.7)
HGB BLD-MCNC: 7.3 G/DL (ref 11.7–15.7)
HGB BLD-MCNC: 7.7 G/DL (ref 11.7–15.7)
HGB UR QL STRIP: ABNORMAL
IMM GRANULOCYTES # BLD: 0.1 10E3/UL
IMM GRANULOCYTES NFR BLD: 1 %
INTERPRETATION ECG - MUSE: NORMAL
KETONES UR STRIP-MCNC: NEGATIVE MG/DL
LACTATE SERPL-SCNC: 1.9 MMOL/L (ref 0.7–2)
LEUKOCYTE ESTERASE UR QL STRIP: NEGATIVE
LYMPHOCYTES # BLD AUTO: 1.3 10E3/UL (ref 0.8–5.3)
LYMPHOCYTES NFR BLD AUTO: 16 %
MAGNESIUM SERPL-MCNC: 1.4 MG/DL (ref 1.8–2.6)
MCH RBC QN AUTO: 26.4 PG (ref 26.5–33)
MCH RBC QN AUTO: 26.8 PG (ref 26.5–33)
MCH RBC QN AUTO: 26.9 PG (ref 26.5–33)
MCHC RBC AUTO-ENTMCNC: 31.4 G/DL (ref 31.5–36.5)
MCHC RBC AUTO-ENTMCNC: 32.6 G/DL (ref 31.5–36.5)
MCHC RBC AUTO-ENTMCNC: 32.7 G/DL (ref 31.5–36.5)
MCV RBC AUTO: 82 FL (ref 78–100)
MCV RBC AUTO: 82 FL (ref 78–100)
MCV RBC AUTO: 84 FL (ref 78–100)
MONOCYTES # BLD AUTO: 0.5 10E3/UL (ref 0–1.3)
MONOCYTES NFR BLD AUTO: 6 %
NEUTROPHILS # BLD AUTO: 6.3 10E3/UL (ref 1.6–8.3)
NEUTROPHILS NFR BLD AUTO: 77 %
NITRATE UR QL: NEGATIVE
NRBC # BLD AUTO: 0 10E3/UL
NRBC BLD AUTO-RTO: 0 /100
OXYHGB MFR BLDV: 88.7 % (ref 70–75)
P AXIS - MUSE: 38 DEGREES
PCO2 BLDV: 35 MM HG (ref 35–50)
PH BLDV: 7.09 [PH] (ref 7.35–7.45)
PH UR STRIP: 6 [PH] (ref 5–7)
PLATELET # BLD AUTO: 175 10E3/UL (ref 150–450)
PLATELET # BLD AUTO: 192 10E3/UL (ref 150–450)
PLATELET # BLD AUTO: 212 10E3/UL (ref 150–450)
PO2 BLDV: 61 MM HG (ref 25–47)
POTASSIUM BLD-SCNC: 3.1 MMOL/L (ref 3.5–5)
POTASSIUM BLD-SCNC: 3.5 MMOL/L (ref 3.5–5)
PR INTERVAL - MUSE: 208 MS
PROT SERPL-MCNC: 6.3 G/DL (ref 6–8)
PROT SERPL-MCNC: 6.9 G/DL (ref 6–8)
QRS DURATION - MUSE: 78 MS
QT - MUSE: 354 MS
QTC - MUSE: 454 MS
R AXIS - MUSE: -44 DEGREES
RBC # BLD AUTO: 2.42 10E6/UL (ref 3.8–5.2)
RBC # BLD AUTO: 2.72 10E6/UL (ref 3.8–5.2)
RBC # BLD AUTO: 2.92 10E6/UL (ref 3.8–5.2)
RBC URINE: 0 /HPF
SAO2 % BLDV: 90.1 % (ref 70–75)
SARS-COV-2 RNA RESP QL NAA+PROBE: NEGATIVE
SODIUM SERPL-SCNC: 139 MMOL/L (ref 136–145)
SODIUM SERPL-SCNC: 142 MMOL/L (ref 136–145)
SP GR UR STRIP: 1.01 (ref 1–1.03)
SYSTOLIC BLOOD PRESSURE - MUSE: 160 MMHG
T AXIS - MUSE: 69 DEGREES
TROPONIN I SERPL-MCNC: 5.74 NG/ML (ref 0–0.29)
TROPONIN I SERPL-MCNC: 7.65 NG/ML (ref 0–0.29)
TSH SERPL DL<=0.005 MIU/L-ACNC: 0.86 UIU/ML (ref 0.3–5)
UROBILINOGEN UR STRIP-MCNC: <2 MG/DL
VENTRICULAR RATE- MUSE: 99 BPM
WBC # BLD AUTO: 8.1 10E3/UL (ref 4–11)
WBC # BLD AUTO: 8.7 10E3/UL (ref 4–11)
WBC # BLD AUTO: 8.9 10E3/UL (ref 4–11)
WBC URINE: 1 /HPF

## 2022-01-01 PROCEDURE — 85027 COMPLETE CBC AUTOMATED: CPT

## 2022-01-01 PROCEDURE — 99232 SBSQ HOSP IP/OBS MODERATE 35: CPT | Mod: GC | Performed by: STUDENT IN AN ORGANIZED HEALTH CARE EDUCATION/TRAINING PROGRAM

## 2022-01-01 PROCEDURE — 120N000001 HC R&B MED SURG/OB

## 2022-01-01 PROCEDURE — 250N000009 HC RX 250

## 2022-01-01 PROCEDURE — 250N000013 HC RX MED GY IP 250 OP 250 PS 637

## 2022-01-01 PROCEDURE — C9113 INJ PANTOPRAZOLE SODIUM, VIA: HCPCS | Performed by: EMERGENCY MEDICINE

## 2022-01-01 PROCEDURE — 99285 EMERGENCY DEPT VISIT HI MDM: CPT | Mod: 25

## 2022-01-01 PROCEDURE — 99238 HOSP IP/OBS DSCHRG MGMT 30/<: CPT | Mod: GC | Performed by: STUDENT IN AN ORGANIZED HEALTH CARE EDUCATION/TRAINING PROGRAM

## 2022-01-01 PROCEDURE — 250N000011 HC RX IP 250 OP 636: Performed by: EMERGENCY MEDICINE

## 2022-01-01 PROCEDURE — 36415 COLL VENOUS BLD VENIPUNCTURE: CPT | Performed by: STUDENT IN AN ORGANIZED HEALTH CARE EDUCATION/TRAINING PROGRAM

## 2022-01-01 PROCEDURE — 258N000003 HC RX IP 258 OP 636

## 2022-01-01 PROCEDURE — 93005 ELECTROCARDIOGRAM TRACING: CPT | Performed by: EMERGENCY MEDICINE

## 2022-01-01 PROCEDURE — 250N000009 HC RX 250: Performed by: EMERGENCY MEDICINE

## 2022-01-01 PROCEDURE — 258N000003 HC RX IP 258 OP 636: Performed by: EMERGENCY MEDICINE

## 2022-01-01 PROCEDURE — 250N000011 HC RX IP 250 OP 636: Performed by: STUDENT IN AN ORGANIZED HEALTH CARE EDUCATION/TRAINING PROGRAM

## 2022-01-01 PROCEDURE — 83880 ASSAY OF NATRIURETIC PEPTIDE: CPT | Performed by: EMERGENCY MEDICINE

## 2022-01-01 PROCEDURE — C9803 HOPD COVID-19 SPEC COLLECT: HCPCS

## 2022-01-01 PROCEDURE — 96375 TX/PRO/DX INJ NEW DRUG ADDON: CPT

## 2022-01-01 PROCEDURE — 36415 COLL VENOUS BLD VENIPUNCTURE: CPT

## 2022-01-01 PROCEDURE — 84484 ASSAY OF TROPONIN QUANT: CPT | Performed by: EMERGENCY MEDICINE

## 2022-01-01 PROCEDURE — 36415 COLL VENOUS BLD VENIPUNCTURE: CPT | Performed by: EMERGENCY MEDICINE

## 2022-01-01 PROCEDURE — 80053 COMPREHEN METABOLIC PANEL: CPT

## 2022-01-01 PROCEDURE — 84443 ASSAY THYROID STIM HORMONE: CPT | Performed by: EMERGENCY MEDICINE

## 2022-01-01 PROCEDURE — 82805 BLOOD GASES W/O2 SATURATION: CPT

## 2022-01-01 PROCEDURE — 85025 COMPLETE CBC W/AUTO DIFF WBC: CPT | Performed by: EMERGENCY MEDICINE

## 2022-01-01 PROCEDURE — 87635 SARS-COV-2 COVID-19 AMP PRB: CPT | Performed by: EMERGENCY MEDICINE

## 2022-01-01 PROCEDURE — 83735 ASSAY OF MAGNESIUM: CPT | Performed by: EMERGENCY MEDICINE

## 2022-01-01 PROCEDURE — 96365 THER/PROPH/DIAG IV INF INIT: CPT

## 2022-01-01 PROCEDURE — 80053 COMPREHEN METABOLIC PANEL: CPT | Performed by: EMERGENCY MEDICINE

## 2022-01-01 PROCEDURE — 83605 ASSAY OF LACTIC ACID: CPT | Performed by: EMERGENCY MEDICINE

## 2022-01-01 PROCEDURE — 96361 HYDRATE IV INFUSION ADD-ON: CPT

## 2022-01-01 PROCEDURE — 250N000011 HC RX IP 250 OP 636

## 2022-01-01 PROCEDURE — 81003 URINALYSIS AUTO W/O SCOPE: CPT | Performed by: EMERGENCY MEDICINE

## 2022-01-01 PROCEDURE — 99223 1ST HOSP IP/OBS HIGH 75: CPT | Mod: AI

## 2022-01-01 PROCEDURE — 71045 X-RAY EXAM CHEST 1 VIEW: CPT

## 2022-01-01 PROCEDURE — 84484 ASSAY OF TROPONIN QUANT: CPT

## 2022-01-01 PROCEDURE — 85027 COMPLETE CBC AUTOMATED: CPT | Performed by: STUDENT IN AN ORGANIZED HEALTH CARE EDUCATION/TRAINING PROGRAM

## 2022-01-01 PROCEDURE — 250N000011 HC RX IP 250 OP 636: Performed by: FAMILY MEDICINE

## 2022-01-01 RX ORDER — SODIUM CHLORIDE 9 MG/ML
INJECTION, SOLUTION INTRAVENOUS CONTINUOUS
Status: DISCONTINUED | OUTPATIENT
Start: 2022-01-01 | End: 2022-01-01

## 2022-01-01 RX ORDER — LIDOCAINE 40 MG/G
CREAM TOPICAL
Status: DISCONTINUED | OUTPATIENT
Start: 2022-01-01 | End: 2022-01-01 | Stop reason: HOSPADM

## 2022-01-01 RX ORDER — ACETAMINOPHEN 325 MG/1
650 TABLET ORAL EVERY 4 HOURS PRN
Status: DISCONTINUED | OUTPATIENT
Start: 2022-01-01 | End: 2022-01-01

## 2022-01-01 RX ORDER — ONDANSETRON 2 MG/ML
4 INJECTION INTRAMUSCULAR; INTRAVENOUS EVERY 6 HOURS PRN
Status: DISCONTINUED | OUTPATIENT
Start: 2022-01-01 | End: 2022-01-01 | Stop reason: HOSPADM

## 2022-01-01 RX ORDER — ONDANSETRON 4 MG/1
4 TABLET, ORALLY DISINTEGRATING ORAL EVERY 6 HOURS PRN
Status: DISCONTINUED | OUTPATIENT
Start: 2022-01-01 | End: 2022-01-01

## 2022-01-01 RX ORDER — LORAZEPAM 1 MG/1
1 TABLET ORAL
Status: DISCONTINUED | OUTPATIENT
Start: 2022-01-01 | End: 2022-01-01

## 2022-01-01 RX ORDER — ATROPINE SULFATE 10 MG/ML
2 SOLUTION/ DROPS OPHTHALMIC EVERY 4 HOURS PRN
Status: DISCONTINUED | OUTPATIENT
Start: 2022-01-01 | End: 2022-01-01

## 2022-01-01 RX ORDER — MAGNESIUM SULFATE HEPTAHYDRATE 40 MG/ML
2 INJECTION, SOLUTION INTRAVENOUS ONCE
Status: COMPLETED | OUTPATIENT
Start: 2022-01-01 | End: 2022-01-01

## 2022-01-01 RX ORDER — NALOXONE HYDROCHLORIDE 0.4 MG/ML
0.2 INJECTION, SOLUTION INTRAMUSCULAR; INTRAVENOUS; SUBCUTANEOUS
Status: DISCONTINUED | OUTPATIENT
Start: 2022-01-01 | End: 2022-01-01 | Stop reason: HOSPADM

## 2022-01-01 RX ORDER — NALOXONE HYDROCHLORIDE 0.4 MG/ML
0.1 INJECTION, SOLUTION INTRAMUSCULAR; INTRAVENOUS; SUBCUTANEOUS
Status: DISCONTINUED | OUTPATIENT
Start: 2022-01-01 | End: 2022-01-01 | Stop reason: HOSPADM

## 2022-01-01 RX ORDER — POTASSIUM CHLORIDE 7.45 MG/ML
10 INJECTION INTRAVENOUS ONCE
Status: COMPLETED | OUTPATIENT
Start: 2022-01-01 | End: 2022-01-01

## 2022-01-01 RX ORDER — ACETAMINOPHEN 325 MG/1
325 TABLET ORAL EVERY 4 HOURS PRN
Status: DISCONTINUED | OUTPATIENT
Start: 2022-01-01 | End: 2022-01-01

## 2022-01-01 RX ORDER — PANTOPRAZOLE SODIUM 40 MG/1
40 TABLET, DELAYED RELEASE ORAL
Status: DISCONTINUED | OUTPATIENT
Start: 2022-01-01 | End: 2022-01-01

## 2022-01-01 RX ORDER — ONDANSETRON 2 MG/ML
4 INJECTION INTRAMUSCULAR; INTRAVENOUS ONCE
Status: COMPLETED | OUTPATIENT
Start: 2022-01-01 | End: 2022-01-01

## 2022-01-01 RX ORDER — LORAZEPAM 2 MG/ML
1 INJECTION INTRAMUSCULAR
Status: DISCONTINUED | OUTPATIENT
Start: 2022-01-01 | End: 2022-01-01 | Stop reason: HOSPADM

## 2022-01-01 RX ORDER — MINERAL OIL/HYDROPHIL PETROLAT
OINTMENT (GRAM) TOPICAL
Status: DISCONTINUED | OUTPATIENT
Start: 2022-01-01 | End: 2022-01-01 | Stop reason: HOSPADM

## 2022-01-01 RX ORDER — CALCIUM GLUCONATE 94 MG/ML
1 INJECTION, SOLUTION INTRAVENOUS ONCE
Status: COMPLETED | OUTPATIENT
Start: 2022-01-01 | End: 2022-01-01

## 2022-01-01 RX ORDER — CARBOXYMETHYLCELLULOSE SODIUM 5 MG/ML
1-2 SOLUTION/ DROPS OPHTHALMIC
Status: DISCONTINUED | OUTPATIENT
Start: 2022-01-01 | End: 2022-01-01 | Stop reason: HOSPADM

## 2022-01-01 RX ORDER — ATROPINE SULFATE 10 MG/ML
2 SOLUTION/ DROPS OPHTHALMIC EVERY 4 HOURS PRN
Status: DISCONTINUED | OUTPATIENT
Start: 2022-01-01 | End: 2022-01-01 | Stop reason: HOSPADM

## 2022-01-01 RX ORDER — SODIUM CHLORIDE 9 MG/ML
INJECTION, SOLUTION INTRAVENOUS ONCE
Status: COMPLETED | OUTPATIENT
Start: 2022-01-01 | End: 2022-01-01

## 2022-01-01 RX ORDER — BISACODYL 10 MG
10 SUPPOSITORY, RECTAL RECTAL
Status: DISCONTINUED | OUTPATIENT
Start: 2022-01-01 | End: 2022-01-01 | Stop reason: HOSPADM

## 2022-01-01 RX ORDER — PANTOPRAZOLE SODIUM 20 MG/1
20 TABLET, DELAYED RELEASE ORAL
Status: DISCONTINUED | OUTPATIENT
Start: 2022-01-01 | End: 2022-01-01

## 2022-01-01 RX ORDER — ACETAMINOPHEN 650 MG/1
650 SUPPOSITORY RECTAL EVERY 6 HOURS PRN
Status: DISCONTINUED | OUTPATIENT
Start: 2022-01-01 | End: 2022-01-01

## 2022-01-01 RX ORDER — HYDROMORPHONE HYDROCHLORIDE 1 MG/ML
.3-.5 INJECTION, SOLUTION INTRAMUSCULAR; INTRAVENOUS; SUBCUTANEOUS
Status: DISCONTINUED | OUTPATIENT
Start: 2022-01-01 | End: 2022-01-01

## 2022-01-01 RX ORDER — MORPHINE SULFATE 2 MG/ML
1-2 INJECTION, SOLUTION INTRAMUSCULAR; INTRAVENOUS
Status: DISCONTINUED | OUTPATIENT
Start: 2022-01-01 | End: 2022-01-01 | Stop reason: HOSPADM

## 2022-01-01 RX ADMIN — MORPHINE SULFATE 1 MG: 2 INJECTION, SOLUTION INTRAMUSCULAR; INTRAVENOUS at 09:12

## 2022-01-01 RX ADMIN — SODIUM CHLORIDE: 9 INJECTION, SOLUTION INTRAVENOUS at 01:09

## 2022-01-01 RX ADMIN — MORPHINE SULFATE 2 MG: 2 INJECTION, SOLUTION INTRAMUSCULAR; INTRAVENOUS at 20:05

## 2022-01-01 RX ADMIN — ATROPINE SULFATE 2 DROP: 10 SOLUTION/ DROPS OPHTHALMIC at 13:11

## 2022-01-01 RX ADMIN — LORAZEPAM 1 MG: 1 TABLET ORAL at 14:31

## 2022-01-01 RX ADMIN — MORPHINE SULFATE 1 MG: 2 INJECTION, SOLUTION INTRAMUSCULAR; INTRAVENOUS at 11:36

## 2022-01-01 RX ADMIN — ATROPINE SULFATE 2 DROP: 10 SOLUTION/ DROPS OPHTHALMIC at 14:31

## 2022-01-01 RX ADMIN — LORAZEPAM 1 MG: 2 INJECTION INTRAMUSCULAR; INTRAVENOUS at 13:11

## 2022-01-01 RX ADMIN — SODIUM BICARBONATE 50 MEQ: 84 INJECTION, SOLUTION INTRAVENOUS at 20:15

## 2022-01-01 RX ADMIN — ATROPINE SULFATE 2 DROP: 10 SOLUTION/ DROPS OPHTHALMIC at 09:08

## 2022-01-01 RX ADMIN — ACETAMINOPHEN 325 MG: 325 TABLET ORAL at 01:16

## 2022-01-01 RX ADMIN — MORPHINE SULFATE 2 MG: 2 INJECTION, SOLUTION INTRAMUSCULAR; INTRAVENOUS at 23:09

## 2022-01-01 RX ADMIN — MAGNESIUM SULFATE HEPTAHYDRATE 2 G: 40 INJECTION, SOLUTION INTRAVENOUS at 13:47

## 2022-01-01 RX ADMIN — MORPHINE SULFATE 1 MG: 2 INJECTION, SOLUTION INTRAMUSCULAR; INTRAVENOUS at 21:16

## 2022-01-01 RX ADMIN — PANTOPRAZOLE SODIUM 40 MG: 40 INJECTION, POWDER, FOR SOLUTION INTRAVENOUS at 18:38

## 2022-01-01 RX ADMIN — ONDANSETRON 4 MG: 2 INJECTION INTRAMUSCULAR; INTRAVENOUS at 18:36

## 2022-01-01 RX ADMIN — ACETAMINOPHEN 325 MG: 325 TABLET ORAL at 09:00

## 2022-01-01 RX ADMIN — SODIUM CHLORIDE: 9 INJECTION, SOLUTION INTRAVENOUS at 18:40

## 2022-01-01 RX ADMIN — CALCIUM GLUCONATE 1 G: 98 INJECTION, SOLUTION INTRAVENOUS at 20:08

## 2022-01-01 RX ADMIN — MAGNESIUM SULFATE HEPTAHYDRATE 2 G: 40 INJECTION, SOLUTION INTRAVENOUS at 22:23

## 2022-01-01 RX ADMIN — POTASSIUM CHLORIDE 10 MEQ: 7.46 INJECTION, SOLUTION INTRAVENOUS at 20:24

## 2022-01-01 RX ADMIN — MORPHINE SULFATE 2 MG: 2 INJECTION, SOLUTION INTRAMUSCULAR; INTRAVENOUS at 23:28

## 2022-01-01 RX ADMIN — PANTOPRAZOLE SODIUM 20 MG: 20 TABLET, DELAYED RELEASE ORAL at 09:00

## 2022-01-01 RX ADMIN — ONDANSETRON 4 MG: 2 INJECTION INTRAMUSCULAR; INTRAVENOUS at 15:51

## 2022-01-01 RX ADMIN — ATROPINE SULFATE 2 DROP: 10 SOLUTION/ DROPS OPHTHALMIC at 18:58

## 2022-01-01 RX ADMIN — LORAZEPAM 1 MG: 1 TABLET ORAL at 01:17

## 2022-01-01 ASSESSMENT — ACTIVITIES OF DAILY LIVING (ADL)
ADLS_ACUITY_SCORE: 21
ADLS_ACUITY_SCORE: 24
ADLS_ACUITY_SCORE: 21
ADLS_ACUITY_SCORE: 12
ADLS_ACUITY_SCORE: 24
ADLS_ACUITY_SCORE: 19
ADLS_ACUITY_SCORE: 18
ADLS_ACUITY_SCORE: 18
ADLS_ACUITY_SCORE: 19
ADLS_ACUITY_SCORE: 12
ADLS_ACUITY_SCORE: 21
DRESSING/BATHING_DIFFICULTY: OTHER (SEE COMMENTS)
ADLS_ACUITY_SCORE: 23
ADLS_ACUITY_SCORE: 21
ADLS_ACUITY_SCORE: 12
ADLS_ACUITY_SCORE: 24
ADLS_ACUITY_SCORE: 21
ADLS_ACUITY_SCORE: 18
ADLS_ACUITY_SCORE: 18
ADLS_ACUITY_SCORE: 12
ADLS_ACUITY_SCORE: 20
ADLS_ACUITY_SCORE: 24
ADLS_ACUITY_SCORE: 21
ADLS_ACUITY_SCORE: 19
ADLS_ACUITY_SCORE: 24
ADLS_ACUITY_SCORE: 21
ADLS_ACUITY_SCORE: 19
ADLS_ACUITY_SCORE: 18
ADLS_ACUITY_SCORE: 22
ADLS_ACUITY_SCORE: 12
ADLS_ACUITY_SCORE: 21
ADLS_ACUITY_SCORE: 12
ADLS_ACUITY_SCORE: 19
ADLS_ACUITY_SCORE: 22
ADLS_ACUITY_SCORE: 12
ADLS_ACUITY_SCORE: 24
ADLS_ACUITY_SCORE: 12
ADLS_ACUITY_SCORE: 18
ADLS_ACUITY_SCORE: 24
ADLS_ACUITY_SCORE: 21
ADLS_ACUITY_SCORE: 12
ADLS_ACUITY_SCORE: 21
ADLS_ACUITY_SCORE: 19
ADLS_ACUITY_SCORE: 22
CONCENTRATING,_REMEMBERING_OR_MAKING_DECISIONS_DIFFICULTY: OTHER (SEE COMMENTS)
ADLS_ACUITY_SCORE: 19
ADLS_ACUITY_SCORE: 21
ADLS_ACUITY_SCORE: 19
ADLS_ACUITY_SCORE: 19
ADLS_ACUITY_SCORE: 24
DIFFICULTY_EATING/SWALLOWING: OTHER (SEE COMMENTS)
ADLS_ACUITY_SCORE: 12
ADLS_ACUITY_SCORE: 24
ADLS_ACUITY_SCORE: 12
ADLS_ACUITY_SCORE: 12
WALKING_OR_CLIMBING_STAIRS_DIFFICULTY: OTHER (SEE COMMENTS)
ADLS_ACUITY_SCORE: 22
ADLS_ACUITY_SCORE: 12
ADLS_ACUITY_SCORE: 19
ADLS_ACUITY_SCORE: 12
ADLS_ACUITY_SCORE: 22
ADLS_ACUITY_SCORE: 19
ADLS_ACUITY_SCORE: 21
ADLS_ACUITY_SCORE: 19
ADLS_ACUITY_SCORE: 24
ADLS_ACUITY_SCORE: 12
ADLS_ACUITY_SCORE: 12
WEAR_GLASSES_OR_BLIND: NO
TOILETING_ISSUES: OTHER (SEE COMMENTS)
ADLS_ACUITY_SCORE: 19
ADLS_ACUITY_SCORE: 19
ADLS_ACUITY_SCORE: 21
ADLS_ACUITY_SCORE: 22
ADLS_ACUITY_SCORE: 22
ADLS_ACUITY_SCORE: 20
ADLS_ACUITY_SCORE: 24
ADLS_ACUITY_SCORE: 22
ADLS_ACUITY_SCORE: 21
ADLS_ACUITY_SCORE: 18
ADLS_ACUITY_SCORE: 21
ADLS_ACUITY_SCORE: 21
DOING_ERRANDS_INDEPENDENTLY_DIFFICULTY: OTHER (SEE COMMENTS)

## 2022-03-31 PROBLEM — Z66 DNR (DO NOT RESUSCITATE): Status: ACTIVE | Noted: 2022-01-01

## 2022-03-31 PROBLEM — E87.20 METABOLIC ACIDOSIS: Status: ACTIVE | Noted: 2022-01-01

## 2022-03-31 PROBLEM — E87.6 HYPOKALEMIA: Status: ACTIVE | Noted: 2022-01-01

## 2022-03-31 PROBLEM — E83.42 HYPOMAGNESEMIA: Status: ACTIVE | Noted: 2022-01-01

## 2022-03-31 PROBLEM — E83.51 HYPOCALCEMIA: Status: ACTIVE | Noted: 2022-01-01

## 2022-03-31 PROBLEM — N17.9 ACUTE RENAL FAILURE, UNSPECIFIED ACUTE RENAL FAILURE TYPE (H): Status: ACTIVE | Noted: 2022-01-01

## 2022-03-31 NOTE — TELEPHONE ENCOUNTER
Reason for Disposition    SEVERE weakness (i.e., unable to walk or barely able to walk, requires support) and new onset or worsening    Additional Information    Negative: Severe difficulty breathing (e.g., struggling for each breath, speaks in single words)    Negative: Shock suspected (e.g., cold/pale/clammy skin, too weak to stand, low BP, rapid pulse)    Negative: Difficult to awaken or acting confused (e.g., disoriented, slurred speech)    Negative: Fainted > 15 minutes ago and still feels too weak or dizzy to stand    Negative: Sounds like a life-threatening emergency to the triager    Answer Assessment - Initial Assessment Questions  1. DESCRIPTION: weakness and not eating since 3/24/2022 with very small fluid intake.    2. SEVERITY: wheelchair bound. Loss of strength per son's report. Prior to 3/24/2022 sit in wheelchair and able to move herself a little while in wheelchair about 4-5 feet distance.    3. ONSET:  3/30/2022    4. CAUSE: decrease oral food and fluid intake. No other symptoms, no abdominal pain. Prior to symptoms onset son reports patient was complaining about back discomfort.    5. MEDICINES: no changes, does not take routine medications. Occasional take Tylenol. Stopped taking diabetes medication on her own for 7-8 years.     6. OTHER SYMPTOMS: none    7. PREGNANCY: n/a    Protocols used: WEAKNESS (GENERALIZED) AND FATIGUE-A-OH

## 2022-03-31 NOTE — ED TRIAGE NOTES
Patient presents to ED via [facility] ambulance for 5 days of fatigue and not eating well.  Patient is deaf.  Family on way.

## 2022-03-31 NOTE — TELEPHONE ENCOUNTER
Provider Recommendation Follow Up:   Reached patient/caregiver. Informed of provider's recommendations. Son verbalized understanding and agrees with the plan. RLee RN

## 2022-03-31 NOTE — TELEPHONE ENCOUNTER
Nurse Triage SBAR    Is this a 2nd Level Triage? YES, LICENSED PRACTITIONER REVIEW IS REQUIRED    Situation: per protocol, precepting MD to review RN note and recommendation. Please give approval of recommendation or further alternative. Thank you.    Background: Patient's son Ryland, calling to report patient's symptoms. Since 3/24/2022 has not been able to eat at all, no food with very small fluid intake. Patient is wheelchair bound, prior to 3/24/2022 was able to be assisted into w/c and push/ move herself about 4-5 feet while in w/c. Normally will nap about 4-5 hours a day, since 3/30/2022 sleeps throughout the day. Still easily awakened and recognizes her son.    Assessment: per son, means of transportation would be to initiate EMS. Unable to get mom/pt from home to vehicle safely.    Protocol Recommended Disposition:   Call  Now    Recommendation: will route this encounter to precepting physician to review. Thank you.     Routed to provider    Does the patient meet one of the following criteria for ADS visit consideration? 16+ years old, with an [facility] PCP     TIP  Providers, please consider if this condition is appropriate for management at one of our Acute and Diagnostic Services sites.     If patient is a good candidate, please use dotphrase <dot>triageresponse and select Refer to ADS to document.    Iza XIONG

## 2022-03-31 NOTE — ED PROVIDER NOTES
EMERGENCY DEPARTMENT ENCOUNTER      NAME: Vignesh Fallon  AGE: 86 year old female  YOB: 1935  MRN: [id number]  EVALUATION DATE & TIME: 3/31/2022  5:35 PM    PCP: Anne Zurita    ED PROVIDER: Jg Oseguera M.D.      Chief Complaint   Patient presents with     Fatigue         FINAL IMPRESSION:  Weakness  Hypokalemia  Hypomagnesemia  Hypocalcemia  Metabolic acidosis  Acute renal failure  DNR  Coffee-ground emesis  Elevated troponin      ED COURSE & MEDICAL DECISION MAKING:    Pertinent Labs & Imaging studies reviewed. (See chart for details)  86 year old female presents to the Emergency Department for evaluation of weakness.  No history available as patient reportedly deaf and non-English speaking.  Patient is a frail cachectic appearing female with marked kyphosis.  She appears to have had some coffee-ground emesis.  Significant murmur in the left axilla area with jugular venous distention and pulsations within the neck.  Blood work sent for evaluation concern is of dehydration possible anemia.  Possible infectious process.  Urine analysis also been obtained.  Patient treated symptomatically with low-dose intravenous fluids along with Zofran and Pepcid for her vomiting..    6:00PM I met with the patient for the initial interview and physical examination. Discussed plan for treatment and workup in the ED.    7:05 PM.  Patient discussed with family member with whom she lives.  He states she is not in any medications presently.  Previously on antihypertensive and diabetic medications.  Patient typically able to move about in a wheelchair.  Has not had anything to eat in 3 to 5 days secondary to decreased appetite.  She has not had any vomiting.  Patient has had a single Covid vaccination.  Given her presentation plans are for hospitalization.  He was agreeable.  Patient's CODE STATUS also discussed.  Patient is DNR.  7:21 PM.  Lab reports patient acute renal failure.  Creatinine is 6.14.  BUN 66.  Bicarb  9.  Potassium also reduced at 3.1.  Alkaline phosphatase elevated 492 but transaminases normal.  Lactic acid normal.  Marked anemia with hemoglobin 7.7.  Microcytic indices.  BNP moderately elevated 554.  Magnesium low at 1.4.  Intravenous fluids have been initiated at low dose previously.  Supplemental potassium and magnesium supplementation will be initiated.  Calcium supplementation will also be added as calcium is marked reduced at 5.7 also.  Initial EKG is unremarkable in spite of multiple electrolyte imbalances.  Slight ST segment flattening laterally but nothing alarming.  Chest x-ray without acute infiltrates.  Call will be placed to admitting physician.  7:32 PM I spoke with Dr. Gonzalez, [facility] resident who agrees to admit the patient.   7:51 PM.  Troponin returned elevated at 7.65.  However difficult to interpret in the setting of acute renal failure.  No acute ST segment abnormalities on EKG.  These will be trended.  Information will be conveyed to Dr. Gonzalez  8:26 PM.  Dr. Gonzalez's concerned about patient being unresponsive.  Patient reevaluated.  With slight stimulation patient opened her eyes and looked about as she had done previously.  No significant change in wellbeing            Patient represents a critical care situation.  Approximately 40 minutes spent directly involved in patient's care independent of any procedures.      PPE: Provider wore gloves, surgical cap, and N95 mask.    MEDICATIONS GIVEN IN THE EMERGENCY:  Medications - No data to display    NEW PRESCRIPTIONS STARTED AT TODAY'S ER VISIT  New Prescriptions    No medications on file          =================================================================    HPI    Patient information was obtained from: EMS    Use of Intrepreter: N/A         Vignesh Leeanne is a 86 year old female with a pertient medical history of CKD4, hypertension, anemia, and DM2 who presents to the ED for evaluation of fatigue and generalized weakness.     HPI and ROS  are limited - patient deaf and Hmong speaking    Per EMS, patient comes from assisted living. They reported 5 days of fatigue, generalized weakness, decreased appetite, nausea, and vomiting. No fever.       REVIEW OF SYSTEMS   Limited - deaf and Hmong speaking    PAST MEDICAL HISTORY:  Past Medical History:   Diagnosis Date     CKD (chronic kidney disease)      Diabetes (H)      GERD (gastroesophageal reflux disease)      Hypertension      Latent tuberculosis      Zoster        PAST SURGICAL HISTORY:  Past Surgical History:   Procedure Laterality Date     IR MISCELLANEOUS PROCEDURE  1/24/2011     IR URETER DILATION BILATERAL  1/24/2011         CURRENT MEDICATIONS:    No current facility-administered medications for this encounter.    Current Outpatient Medications:      acetaminophen (TYLENOL) 650 MG CR tablet, Take 1 tablet (650 mg) by mouth every 8 hours as needed for mild pain or fever, Disp: 90 tablet, Rfl: 1     amLODIPine (NORVASC) 5 MG tablet, Take 7.5 mg by mouth, Disp: , Rfl:      blood glucose (NO BRAND SPECIFIED) lancets standard, Use to test blood sugar 4 times daily or as directed., Disp: 100 each, Rfl: 3     blood glucose (NO BRAND SPECIFIED) test strip, Use to test blood sugar 4 times daily or as directed., Disp: 100 each, Rfl: 11     blood glucose monitoring (NO BRAND SPECIFIED) meter device kit, Use to test blood sugar 4 times daily or as directed., Disp: 1 kit, Rfl: 0     blood glucose monitoring (NO BRAND SPECIFIED) meter device kit, Use to test blood sugar up to 3 times daily or as directed., Disp: 1 kit, Rfl: 0     ergocalciferol (ERGOCALCIFEROL) 1.25 MG (69189 UT) capsule, , Disp: , Rfl:      lisinopril (ZESTRIL) 5 MG tablet, Take 1 tablet (5 mg) by mouth daily, Disp: 30 tablet, Rfl: 0     loperamide (IMODIUM) 2 MG capsule, Take 2-4 mg by mouth, Disp: , Rfl:      multivitamin (ONE-DAILY) tablet, Take 1 tablet by mouth, Disp: , Rfl:      order for DME, Equipment being ordered: 4 wheeled walker,  "Disp: 1 Units, Rfl: 0    ALLERGIES:  Allergies   Allergen Reactions     No Known Allergies        FAMILY HISTORY:  Family History   Problem Relation Age of Onset     Cancer No family hx of      Diabetes No family hx of      Coronary Artery Disease No family hx of      Heart Disease No family hx of      Hypertension No family hx of        SOCIAL HISTORY:   Social History     Socioeconomic History     Marital status:      Spouse name: None     Number of children: None     Years of education: None     Highest education level: None   Occupational History     None   Tobacco Use     Smoking status: Never Smoker     Smokeless tobacco: Never Used   Substance and Sexual Activity     Alcohol use: No     Drug use: No     Sexual activity: None   Other Topics Concern     None   Social History Narrative     None     Social Determinants of Health     Financial Resource Strain: Not on file   Food Insecurity: Not on file   Transportation Needs: Not on file   Physical Activity: Not on file   Stress: Not on file   Social Connections: Not on file   Intimate Partner Violence: Not on file   Housing Stability: Not on file       VITALS:  Patient Vitals for the past 24 hrs:   BP Temp Temp src Pulse Resp SpO2 Height   03/31/22 1800 (!) 161/76 97.7  F (36.5  C) Oral 91 16 98 % 1.448 m (4' 9\")        PHYSICAL EXAM    Constitutional:  In no apparent distress. Responds to touch. Very frail appearing.   HENT:  Normocephalic, Atraumatic. Bilateral external ears normal. Oropharynx moist. Nose normal. Neck- Normal range of motion with no guarding, No midline cervical tenderness, Supple, No stridor.   Eyes:  PERRL, EOMI with no signs of entrapment, Conjunctiva normal, No discharge.   Respiratory:  Normal breath sounds, No respiratory distress, No wheezing.    Cardiovascular:  Normal heart rate, Normal rhythm. 4/6 systolic injection murmur with pulsations in the left axilla. Jugular distention with pulsations bilaterally.  GI:  Soft, No " tenderness, No distension, No palpable masses  Musculoskeletal:  Intact distal pulses, No edema. Good range of motion in all major joints. No tenderness to palpation or major deformities noted.  Integument:  Warm, Dry, No erythema, No rash.   Neurologic:  Alert, Normal motor function, Normal sensory function, No focal deficits noted.   Psychiatric:  Affect normal, Judgment normal, Mood normal.     LAB:  All pertinent labs reviewed and interpreted.  Results for orders placed or performed during the hospital encounter of 03/31/22   XR Chest Port 1 View     Status: None    Narrative    EXAM: XR CHEST PORT 1 VIEW  LOCATION: [facility]  DATE/TIME: 3/31/2022 6:45 PM    INDICATION: chest pain  COMPARISON: 06/13/2020      Impression    IMPRESSION: Cardiomegaly. Pulmonary vascularity normal. Vague, hazy densities again seen within the mid and lower left lung, similar to the prior study suggesting chronic disease. No new infiltrates or effusions.   Magnesium     Status: Abnormal   Result Value Ref Range    Magnesium 1.4 (L) 1.8 - 2.6 mg/dL   Comprehensive metabolic panel     Status: Abnormal   Result Value Ref Range    Sodium 139 136 - 145 mmol/L    Potassium 3.1 (L) 3.5 - 5.0 mmol/L    Chloride 115 (H) 98 - 107 mmol/L    Carbon Dioxide (CO2) 9 (LL) 22 - 31 mmol/L    Anion Gap 15 5 - 18 mmol/L    Urea Nitrogen 66 (H) 8 - 28 mg/dL    Creatinine 6.14 (HH) 0.60 - 1.10 mg/dL    Calcium 5.7 (LL) 8.5 - 10.5 mg/dL    Glucose 202 (H) 70 - 125 mg/dL    Alkaline Phosphatase 492 (H) 45 - 120 U/L    AST 14 0 - 40 U/L    ALT <9 0 - 45 U/L    Protein Total 6.9 6.0 - 8.0 g/dL    Albumin 3.6 3.5 - 5.0 g/dL    Bilirubin Total 0.5 0.0 - 1.0 mg/dL    GFR Estimate 6 (L) >60 mL/min/1.73m2   B-Type Natriuretic Peptide ([facility] Only)     Status: Abnormal   Result Value Ref Range     (H) 0 - 167 pg/mL   TSH with free T4 reflex     Status: Normal   Result Value Ref Range    TSH 0.86 0.30 - 5.00 uIU/mL   Lactic acid  whole blood     Status: Normal   Result Value Ref Range    Lactic Acid 1.9 0.7 - 2.0 mmol/L   CBC with platelets and differential     Status: Abnormal   Result Value Ref Range    WBC Count 8.1 4.0 - 11.0 10e3/uL    RBC Count 2.92 (L) 3.80 - 5.20 10e6/uL    Hemoglobin 7.7 (L) 11.7 - 15.7 g/dL    Hematocrit 24.5 (L) 35.0 - 47.0 %    MCV 84 78 - 100 fL    MCH 26.4 (L) 26.5 - 33.0 pg    MCHC 31.4 (L) 31.5 - 36.5 g/dL    RDW 16.8 (H) 10.0 - 15.0 %    Platelet Count 212 150 - 450 10e3/uL    % Neutrophils 77 %    % Lymphocytes 16 %    % Monocytes 6 %    % Eosinophils 0 %    % Basophils 0 %    % Immature Granulocytes 1 %    NRBCs per 100 WBC 0 <1 /100    Absolute Neutrophils 6.3 1.6 - 8.3 10e3/uL    Absolute Lymphocytes 1.3 0.8 - 5.3 10e3/uL    Absolute Monocytes 0.5 0.0 - 1.3 10e3/uL    Absolute Eosinophils 0.0 0.0 - 0.7 10e3/uL    Absolute Basophils 0.0 0.0 - 0.2 10e3/uL    Absolute Immature Granulocytes 0.1 <=0.4 10e3/uL    Absolute NRBCs 0.0 10e3/uL   CBC with Platelets & Differential     Status: Abnormal    Narrative    The following orders were created for panel order CBC with Platelets & Differential.  Procedure                               Abnormality         Status                     ---------                               -----------         ------                     CBC with platelets and d...[[id number]]  Abnormal            Final result                 Please view results for these tests on the individual orders.     RADIOLOGY:  Reviewed all pertinent imaging. Please see official radiology report.  No orders to display       EKG:    Normal sinus rhythm.  Rate of 99.  Left axis deviation with LVH with repolarization abnormalities.  When compared to June 13, 2020 essentially unchanged I have independently reviewed and interpreted the EKG(s) documented above.           I, Michelle Yeung, am serving as a scribe to document services personally performed by Jg Oseguera MD, based on my observation and the provider's  statements to me. I, Jg Oseguera MD attest that Michelle Anjana is acting in a scribe capacity, has observed my performance of the services and has documented them in accordance with my direction.    Jg Oseguera M.D.  Emergency Medicine  [facility] EMERGENCY DEPARTMENT     Jg Oseguera MD  03/31/22 1937       Jg Oseguera MD  03/31/22 1952       Jg Oseguera MD  03/31/22 2027

## 2022-03-31 NOTE — ED NOTES
Bed: [facility]-02  Expected date: 3/31/22  Expected time:   Means of arrival: Ambulance  Comments:  SPF   86 F  weakness

## 2022-03-31 NOTE — TELEPHONE ENCOUNTER
[facility] phone call message- general phone call:    Reason for call:     Son call and advised mom have not eaten and barely used the restroom since March 24 2022. (Been using our supplies  we have given them to urinate in )  Mother can not move as much and is not herself. Please call and advise     Return call needed: Yes    OK to leave a message on voice mail? Yes    Primary language: ong      needed? Yes    Call taken on March 31, 2022 at 3:52 PM by Alysia Huffman

## 2022-03-31 NOTE — TELEPHONE ENCOUNTER
Reviewed chart and discussed patient with RN.  Patient last seen at [facility] 7-8 months ago with hx of DM and CKD stage 4.      Due to possible dehydration and increased fatigue, recommend evaluation today.  Son is unable to transport patient on his own.  Agree with RN assessment for son to call EMS.    Asuncion Chaves MD

## 2022-04-01 NOTE — H&P
"    [facility]    History and Physical - Hospitalist Service       Date of Admission:  3/31/2022    Assessment & Plan      Vignesh Fallon is a 86 year old female admitted on 3/31/2022. She has a history of hypertension, diabetes, CKD, and is admitted for somnolence over the past week, found to have multiple electrolyte abnormalities, acute renal failure, evidence of recent cardiac insult.    Altered mental status - stuporous  Likely actively dying  Goals of care  Patient with very low GCS upon my exam.  Not responsive to voice, touch, sternal rub, nailbed pressure.  Awakens briefly when eyelids are lifted.  Stopped eating about a week ago, hardly drinking anything.  Breathing appears slightly labored, rattling in the chest.  Had long discussion with son, Ryland, upon admission, about her goals of care.  He wishes for her to be DNR/DNI.  He states \"she is very old\".  He talks of her telling her life stories to him in the last couple of months, which was very appreciated by him.  He states they had talked about her end-of-life wishes, and she does wish to die at home surrounded by family.  She does not wish to be in any pain or to die in the hospital if at all possible.  - We will have further goals of care discussion tomorrow and discuss hospice pending further clinical data    Acute renal failure  Chronic kidney disease stage IV  Creatinine of 6.14 with GFR of 6 upon admission.  Her baseline appears to be around 2.0, however the last value we have is from almost 2 years ago.  She did see nephrology at one point, who determined her chronic kidney disease to be secondary to poorly treated hypertension and diabetes.  She has not been eating at all over the past week and has had only sips of water during this time, so I strongly suspect that her acute renal failure is secondary to prerenal injury due to dehydration.  No indication for acute dialysis considering overall decline and goals of care per " family.  - BMP in the morning  - Continue IV fluids at 75 ml per hour considering dehydration and prerenal ISAEL as well as likely acute heart failure    Metabolic acidosis  Hypokalemia  Hypocalcemia  Hypomagnesemia  Multiple electrolyte abnormalities present upon admission, including potassium of 3.1, carbon dioxide of 9, calcium of 5.7 (albumin normal at 3.6), magnesium of 1.4.  Lactic acid 1.9.  Glucose 202.  Unclear etiology of her electrolyte derangements, however her acute renal failure is likely the primary , along with her absent oral intake over the past week.  She is also likely retaining CO2 based on her respiratory pattern.  Potassium, magnesium, calcium were replaced in the ED, and sodium bicarbonate was also given.  We will recheck these electrolytes at troponin recheck and replace as needed.     Elevated troponin  Elevated BNP  Holosystolic murmur  EKG without concerning ST changes.  However, with a new very loud holosystolic murmur, troponin of 7.65, and BNP of 545, I suspect she had a recent acute cardiac event leading to valvular compromise such as mitral regurgitation and resulting acute heart failure.  Discussed this with patient's son.  Due to anemia to 7.7 and ER providers witness of coffee-ground emesis, would not give heparin at this time.  I would recommend echocardiogram to better characterize cardiac function, and considering that this is not an invasive test, family is wanting to pursue it at this time.  They would very likely not want any surgical intervention.  - Trend troponin every 6 hours  - Transthoracic echocardiogram ordered    Microcytic anemia  Hemoglobin of 7.7 upon admission.  This does appear to be a chronic problem for her, with prior hemoglobin values on file in the sevens.  This is likely multifactorial, due to longstanding chronic kidney disease, poor nutritional intake, potential GI bleeding with coffee-ground emesis witnessed by ER provider.  - Continue to  monitor  - Transfuse for hemoglobin less than 7 only after talking with patient's son, as it is unclear if this would be within patient/family's goals of care  - CBC in the morning     Diet: Combination Diet Regular Diet Adult  DVT Prophylaxis: Pneumatic Compression Devices  Lee Catheter: Not present  Fluids: 75 ml/hr  Central Lines: None  Cardiac Monitoring: None  Code Status: No CPR- Do NOT Intubate -confirmed with son, Ryland, over the phone.    Disposition Plan   Expected Discharge: Anticipate discharge home in the next 1 to 2 days with hospice     Brenna Gonzalez MD  [facility], PGY-1  Pager #: [phone]    Patient will be formally staffed in the morning.  ______________________________________________________________________    Chief Complaint   Fatigue    History is obtained from the patient's son.  The patient provides no history, as she is deaf, and stuporous.    History of Present Illness   Vignesh Fallon is a 86 year old female who has a history of diabetes, hypertension, CKD, and was brought to the hospital by her family today due to increased sleepiness over the past week.  History is completely provided by patient's son, Ryland ([phone]).  He states that on March 24, which is approximately 1 week ago, the patient stopped eating.  He is not sure why she stopped eating.  Since that time, she has been sleeping more during the day, lately, will sleep for 6 or 7 hours at a time, wake up briefly and take a sip or 2 of water, will use the bedside commode with the help of her son who helps her transfer, then will go back to sleep.  Several days ago she was able to recognize family, but in the past couple of days she has been pretty much sleeping all the time.  She was complaining of some pain in her shoulders and in her back to her son when he was transferring her to the commode.  Otherwise she has not been complaining of anything, but has really just been sleeping.  She  has looked like she has had a little bit of trouble breathing.  She has not been throwing up, no diarrhea, no fevers, no other symptoms.  ER provider did notice coffee-ground emesis at the corner of her mouth and on her gown.    Patient used to be seen at [facility].  She is prescribed antihypertensives, though has not been taking any medication at least for the past year.    Review of Systems    Review of systems not obtained due to patient factors - critical condition    Past Medical History    I have reviewed this patient's medical history and updated it with pertinent information if needed.   Past Medical History:   Diagnosis Date     CKD (chronic kidney disease)      Diabetes (H)      GERD (gastroesophageal reflux disease)      Hypertension      Latent tuberculosis      Zoster       Past Surgical History   I have reviewed this patient's surgical history and updated it with pertinent information if needed.  Past Surgical History:   Procedure Laterality Date     IR MISCELLANEOUS PROCEDURE  1/24/2011     IR URETER DILATION BILATERAL  1/24/2011      Social History   I have reviewed this patient's social history and updated it with pertinent information if needed. Vignesh Fallon  reports that she has never smoked. She has never used smokeless tobacco. She reports that she does not drink alcohol and does not use drugs.  She lives with family.  She is deaf.    Family History   Unable to obtain family history secondary to patient's obtundation.    Prior to Admission Medications   Prior to Admission Medications   Prescriptions Last Dose Informant Patient Reported? Taking?   amLODIPine (NORVASC) 5 MG tablet Not Taking at Unknown time  Yes No   Sig: Take 7.5 mg by mouth   Patient not taking: Reported on 3/31/2022   blood glucose (NO BRAND SPECIFIED) lancets standard   No No   Sig: Use to test blood sugar 4 times daily or as directed.   blood glucose (NO BRAND SPECIFIED) test strip   No No   Sig: Use to test blood  sugar 4 times daily or as directed.   blood glucose monitoring (NO BRAND SPECIFIED) meter device kit   No No   Sig: Use to test blood sugar up to 3 times daily or as directed.   blood glucose monitoring (NO BRAND SPECIFIED) meter device kit   No No   Sig: Use to test blood sugar 4 times daily or as directed.   lisinopril (ZESTRIL) 5 MG tablet Not Taking at Unknown time  No No   Sig: Take 1 tablet (5 mg) by mouth daily   Patient not taking: Reported on 3/31/2022   order for DME   No No   Sig: Equipment being ordered: 4 wheeled walker      Facility-Administered Medications: None     Allergies   Allergies   Allergen Reactions     No Known Allergies        Physical Exam   Vital Signs: Temp: 97.5  F (36.4  C) Temp src: Axillary BP: (!) 159/87 Pulse: 101   Resp: 18 SpO2: 97 % O2 Device: None (Room air)    Weight: 0 lbs 0 oz  GEN: Patient laying in bed, sleeping.    HEEN: Head is atraumatic, normocephalic, pupils small but equal bilaterally, difficult to assess reaction to light.  Mucous membranes dry, keeps mouth open while breathing.  CV: Tachycardic.  Regular rhythm.  Loud holosystolic murmur that radiates to the left axilla and with visible pulsation in the superior neck.    PULM: Lungs diminished.  Significant upper airway rattling sounds.  ABD: Soft, very thin.  NEURO: Does not awaken to voice (deaf).  No response to touch, sternal rub, strong pressure to nailbeds of the fingers.  When I open her eyelids, she seems to wake up and keeps her eyes open, blinking, looking around but not making eye contact, and falling back asleep after about 5 seconds.  SKIN: No rashes, bruising, or other lesions.    Data   Recent Labs   Lab 03/31/22  1833   WBC 8.1   HGB 7.7*   MCV 84         POTASSIUM 3.1*   CHLORIDE 115*   CO2 9*   BUN 66*   CR 6.14*   ANIONGAP 15   ISAIAH 5.7*   *   ALBUMIN 3.6   PROTTOTAL 6.9   BILITOTAL 0.5   ALKPHOS 492*   ALT <9   AST 14     Recent Results (from the past 24 hour(s))   XR Chest  Port 1 View    Narrative    EXAM: XR CHEST PORT 1 VIEW  LOCATION: [facility]  DATE/TIME: 3/31/2022 6:45 PM    INDICATION: chest pain  COMPARISON: 06/13/2020      Impression    IMPRESSION: Cardiomegaly. Pulmonary vascularity normal. Vague, hazy densities again seen within the mid and lower left lung, similar to the prior study suggesting chronic disease. No new infiltrates or effusions.

## 2022-04-01 NOTE — PHARMACY-ADMISSION MEDICATION HISTORY
Pharmacy Note - Admission Medication History    Pertinent Provider Information: her son notes his mom hasn't taken medications for nearly two years. He is the one who manages them for her.       ______________________________________________________________________    Prior To Admission (PTA) med list completed and updated in EMR.       No outpatient medications have been marked as taking for the 3/31/22 encounter (Hospital Encounter).       Information source(s): Family member, Clinic records and [facility]  Method of interview communication: phone    Summary of Changes to PTA Med List  New: nothing  Discontinued: amlodipine, vit D, lisinopril   Changed: nothing     Patient was asked about OTC/herbal products specifically.  PTA med list reflects this.    In the past week, patient estimated taking medication this percent of the time:  less than 50% due to other.    Allergies were reviewed, assessed, and updated with the patient.      Patient does not use any multi-dose medications prior to admission.    The information provided in this note is only as accurate as the sources available at the time of the update(s).    Thank you for the opportunity to participate in the care of this patient.    Artemio Mojica [facility]  3/31/2022 8:07 PM

## 2022-04-01 NOTE — CONSULTS
Talked with son Ryland. States that his plan is to come to hospital [facility] to see how pt is doing. He says he is waiting for his brother to get into town. They will will plan to meet [facility] to decide if they want to bring pt home or go to hospice home. Hospice will continue to be available as needed.     Thank you for this referral and opportunity to work with this family and team.     Lian Jose RN BSN PHN PN  Hospice Referral Specialist  [facility]    [phone]  [email or web address]

## 2022-04-01 NOTE — PROGRESS NOTES
"    [facility]    Progress Note - Hospitalist Service       Date of Admission:  3/31/2022    Assessment & Plan          Vignesh Fallon is a 86 year old female admitted on 3/31/2022. She has a history of hypertension, diabetes, CKD, and is admitted for somnolence over the past week, found to have multiple electrolyte abnormalities, acute renal failure, evidence of recent cardiac insult.    Altered mental status - stuporous  Likely actively dying  Goals of care  Patient with very low GCS upon admission, remains mainly sedated.  Awakens briefly with voice and touch, at times is more sedated.  Stopped eating about a week ago, hardly drinking anything.  Breathing appears slightly labored, rattling in the chest.  Had long discussion with son, Ryland, upon admission, about her goals of care.  He wishes for her to be DNR/DNI.  He states \"she is very old\".  Continues to consider hospice care, concerned that she may need to go to hospice home versus returning to home with family on hospice.  -Hospice consulted, appreciate recommendation  -We will continue conversation with family  -Expect to move to comfort cares later this evening or tomorrow after family discussions.  -Would like to discharge to hospice, awaiting further information about dispo  -Does not want to escalate cares, no invasive work-up or intervention.    Acute renal failure  Chronic kidney disease stage IV  Creatinine of 6.14 with GFR of 6 upon admission.  Her baseline appears to be around 2.0, however the last value we have is from almost 2 years ago.  She did see nephrology at one point, who determined her chronic kidney disease to be secondary to poorly treated hypertension and diabetes.  She has not been eating at all over the past week and has had only sips of water during this time, suspect acute renal failure is secondary to prerenal injury due to dehydration.  No indication for acute dialysis considering overall decline and goals of " care per family.  - Will repeat BMP daily, though may move to comfort cares.  -Again not wishing to escalate cares or proceed an invasive work-up.       Metabolic acidosis  Hypokalemia, resolved  Hypocalcemia  Hypomagnesemia  Multiple electrolyte abnormalities present upon admission, including potassium of 3.1, carbon dioxide of 9, calcium of 5.7 (albumin normal at 3.6), magnesium of 1.4.  Lactic acid 1.9.  Glucose 202.  Unclear etiology of her electrolyte derangements, however her acute renal failure is likely the primary , along with her absent oral intake over the past week.  She is also likely retaining CO2 based on her respiratory pattern.  Potassium, magnesium, calcium were replaced in the ED, and sodium bicarbonate was also given.   -We will check daily labs, on magnesium and potassium replacement protocols  -Anticipate transition to comfort cares, awaiting further discussion    Elevated troponin, downtrending  Elevated BNP  Holosystolic murmur  EKG without concerning ST changes.  However, with a new very loud holosystolic murmur, troponin of 7.65, and BNP of 545,concern for recent acute cardiac event leading to valvular compromise such as mitral regurgitation and resulting acute heart failure.    -Goal is to avoid invasive work-up and intervention, no further work-up indicated at this time.  We will continue to discuss with family care conference.       Microcytic anemia  Hemoglobin of 7.7 upon admission.  This does appear to be a chronic problem for her, with prior hemoglobin values on file in the [facility].  This is likely multifactorial, due to longstanding chronic kidney disease, poor nutritional intake, potential GI bleeding with coffee-ground emesis witnessed by ER provider.  - Continue to monitor  - Transfuse for hemoglobin less than 7, again awaiting further discussion with son about goals of care  -Anticipate transition to comfort cares  - CBC in the morning        Diet: Combination Diet Regular  Diet Adult    DVT Prophylaxis: SCDs in place, will not use medical prophylaxis in the setting of anemia  Lee Catheter: Not present  Fluids: None  Central Lines: None  Cardiac Monitoring: None  Code Status: No CPR- Do NOT Intubate      Disposition Plan   Expected Discharge: 04/02/2022     Anticipated discharge location:  Awaiting care coordination huddle, awaiting goals of care conversation.  Anticipate discharge on hospice, question home hospice versus transfer to skilled nursing facility for hospice cares.         The patient's care was discussed with the Attending Physician, Dr. Redding and Patient's Family.    Anne Zurita MD  Hospitalist Service  [facility]        # Hypocalcemia: corrected calcium <8.5 on admission, will replace as needed  # Hypomagnesemia: Mg = 1.4 mg/dL (Ref range: 1.8 - 2.6 mg/dL) on admission, will replace as needed   # Hypoalbuminemia: Albumin = 3.2 g/dL (Ref range: 3.5 - 5.0 g/dL) on admission, will monitor as appropriate          ______________________________________________________________________    Interval History   No acute events overnight.  Resting comfortably, does alert to voice and touch with balance.  Was able to eat lunch per nursing.  Not communicating at this time.  Spoke with son Ryland, will be in later this evening to see patient.    Ryland reiterates that he understands his mother is likely in the active stages of dying.  He states she has lived a good life and is old.  He will issue would like to die at home surrounded by family.  He is interested in transitioning to hospice care as, but would like to have a family discussion about this.  Would not like to escalate cares, or have any further invasive work-up done on his mother at this time.    Data reviewed today: I reviewed all medications, new labs and imaging results over the last 24 hours. I personally reviewed no images or EKG's today.    Physical Exam   Vital Signs: Temp: 98  F (36.7  C)  Temp src: Axillary BP: (!) 147/62 Pulse: 96   Resp: 18 SpO2: 95 % O2 Device: None (Room air)    Weight: 74 lbs 1.6 oz    GEN: Patient laying in bed, sleeping.   Frail and cachectic appearing.   Does alert to touch and voice with groaning, does not open eyes.  Does not communicate.  HEEN: Head is atraumatic, normocephalict.  Mucous membranes dry, keeps mouth open while breathing.  CV: Regular rate and rhythm.  Loud holosystolic murmur that radiates to the left axilla.  PULM: Lungs diminished.  Significant upper airway rattling sounds throughout bilateral upper lung fields, coarse breath sounds throughout all lung fields bilaterally  ABD: Soft, very thin.  NEURO:  Alerts minimally to touch and voice is able to move all extremities spontaneously.  Not able to follow commands.  Not conversing.  SKIN: No rashes, bruising, or other lesions.    Data      Recent Results (from the past 24 hour(s))   Troponin I    Collection Time: 03/31/22  6:33 PM   Result Value Ref Range    Troponin I 7.65 (HH) 0.00 - 0.29 ng/mL   Magnesium    Collection Time: 03/31/22  6:33 PM   Result Value Ref Range    Magnesium 1.4 (L) 1.8 - 2.6 mg/dL   Comprehensive metabolic panel    Collection Time: 03/31/22  6:33 PM   Result Value Ref Range    Sodium 139 136 - 145 mmol/L    Potassium 3.1 (L) 3.5 - 5.0 mmol/L    Chloride 115 (H) 98 - 107 mmol/L    Carbon Dioxide (CO2) 9 (LL) 22 - 31 mmol/L    Anion Gap 15 5 - 18 mmol/L    Urea Nitrogen 66 (H) 8 - 28 mg/dL    Creatinine 6.14 (HH) 0.60 - 1.10 mg/dL    Calcium 5.7 (LL) 8.5 - 10.5 mg/dL    Glucose 202 (H) 70 - 125 mg/dL    Alkaline Phosphatase 492 (H) 45 - 120 U/L    AST 14 0 - 40 U/L    ALT <9 0 - 45 U/L    Protein Total 6.9 6.0 - 8.0 g/dL    Albumin 3.6 3.5 - 5.0 g/dL    Bilirubin Total 0.5 0.0 - 1.0 mg/dL    GFR Estimate 6 (L) >60 mL/min/1.73m2   B-Type Natriuretic Peptide ([facility] Only)    Collection Time: 03/31/22  6:33 PM   Result Value Ref Range     (H) 0 - 167 pg/mL   TSH with free T4  reflex    Collection Time: 03/31/22  6:33 PM   Result Value Ref Range    TSH 0.86 0.30 - 5.00 uIU/mL   Lactic acid whole blood    Collection Time: 03/31/22  6:33 PM   Result Value Ref Range    Lactic Acid 1.9 0.7 - 2.0 mmol/L   CBC with platelets and differential    Collection Time: 03/31/22  6:33 PM   Result Value Ref Range    WBC Count 8.1 4.0 - 11.0 10e3/uL    RBC Count 2.92 (L) 3.80 - 5.20 10e6/uL    Hemoglobin 7.7 (L) 11.7 - 15.7 g/dL    Hematocrit 24.5 (L) 35.0 - 47.0 %    MCV 84 78 - 100 fL    MCH 26.4 (L) 26.5 - 33.0 pg    MCHC 31.4 (L) 31.5 - 36.5 g/dL    RDW 16.8 (H) 10.0 - 15.0 %    Platelet Count 212 150 - 450 10e3/uL    % Neutrophils 77 %    % Lymphocytes 16 %    % Monocytes 6 %    % Eosinophils 0 %    % Basophils 0 %    % Immature Granulocytes 1 %    NRBCs per 100 WBC 0 <1 /100    Absolute Neutrophils 6.3 1.6 - 8.3 10e3/uL    Absolute Lymphocytes 1.3 0.8 - 5.3 10e3/uL    Absolute Monocytes 0.5 0.0 - 1.3 10e3/uL    Absolute Eosinophils 0.0 0.0 - 0.7 10e3/uL    Absolute Basophils 0.0 0.0 - 0.2 10e3/uL    Absolute Immature Granulocytes 0.1 <=0.4 10e3/uL    Absolute NRBCs 0.0 10e3/uL   ECG 12-Lead with MUSE - [facility]    Collection Time: 03/31/22  7:18 PM   Result Value Ref Range    Systolic Blood Pressure 160 mmHg    Diastolic Blood Pressure 117 mmHg    Ventricular Rate 99 BPM    Atrial Rate 99 BPM    WV Interval 208 ms    QRS Duration 78 ms     ms    QTc 454 ms    P Axis 38 degrees    R AXIS -44 degrees    T Axis 69 degrees    Interpretation ECG       Sinus rhythm  Left axis deviation  Left ventricular hypertrophy with repolarization abnormality  Abnormal ECG  When compared with ECG of 13-JUN-2020 07:58,  QRS axis Shifted left  Nonspecific T wave abnormality no longer evident in Inferior leads  Nonspecific T wave abnormality now evident in Lateral leads  Confirmed by SEE ED PROVIDER NOTE FOR, ECG INTERPRETATION ([id number]),  DANA ROBLES ([id number]) on 4/1/2022 7:06:18 AM     Asymptomatic  COVID-19 Virus (Coronavirus) by PCR Nasopharyngeal    Collection Time: 03/31/22  8:26 PM    Specimen: Nasopharyngeal; Swab   Result Value Ref Range    SARS CoV2 PCR Negative Negative   UA with Microscopic reflex to Culture    Collection Time: 04/01/22  2:16 AM    Specimen: Urine, Catheter   Result Value Ref Range    Color Urine Light Yellow Colorless, Straw, Light Yellow, Yellow    Appearance Urine Clear Clear    Glucose Urine 70  (A) Negative mg/dL    Bilirubin Urine Negative Negative    Ketones Urine Negative Negative mg/dL    Specific Gravity Urine 1.012 1.001 - 1.030    Blood Urine 0.2 mg/dL (A) Negative    pH Urine 6.0 5.0 - 7.0    Protein Albumin Urine 100  (A) Negative mg/dL    Urobilinogen Urine <2.0 <2.0 mg/dL    Nitrite Urine Negative Negative    Leukocyte Esterase Urine Negative Negative    Bacteria Urine None Seen None Seen /HPF    RBC Urine 0 <=2 /HPF    WBC Urine 1 <=5 /HPF   Blood gas venous    Collection Time: 04/01/22  7:00 AM   Result Value Ref Range    pH Venous 7.09 (LL) 7.35 - 7.45    pCO2 Venous 35 35 - 50 mm Hg    pO2 Venous 61 (H) 25 - 47 mm Hg    Bicarbonate Venous 11 (L) 24 - 30 mmol/L    Base Excess/Deficit (+/-) -19.4   mmol/L    Oxyhemoglobin Venous 88.7 (H) 70.0 - 75.0 %    O2 Sat, Venous 90.1 (H) 70.0 - 75.0 %   Comprehensive metabolic panel    Collection Time: 04/01/22  7:00 AM   Result Value Ref Range    Sodium 142 136 - 145 mmol/L    Potassium 3.5 3.5 - 5.0 mmol/L    Chloride 119 (H) 98 - 107 mmol/L    Carbon Dioxide (CO2) 9 (LL) 22 - 31 mmol/L    Anion Gap 14 5 - 18 mmol/L    Urea Nitrogen 64 (H) 8 - 28 mg/dL    Creatinine 5.79 (H) 0.60 - 1.10 mg/dL    Calcium 5.7 (LL) 8.5 - 10.5 mg/dL    Glucose 131 (H) 70 - 125 mg/dL    Alkaline Phosphatase 441 (H) 45 - 120 U/L    AST 12 0 - 40 U/L    ALT <9 0 - 45 U/L    Protein Total 6.3 6.0 - 8.0 g/dL    Albumin 3.2 (L) 3.5 - 5.0 g/dL    Bilirubin Total 0.5 0.0 - 1.0 mg/dL    GFR Estimate 7 (L) >60 mL/min/1.73m2   CBC with platelets     Collection Time: 04/01/22  7:00 AM   Result Value Ref Range    WBC Count 8.9 4.0 - 11.0 10e3/uL    RBC Count 2.72 (L) 3.80 - 5.20 10e6/uL    Hemoglobin 7.3 (L) 11.7 - 15.7 g/dL    Hematocrit 22.4 (L) 35.0 - 47.0 %    MCV 82 78 - 100 fL    MCH 26.8 26.5 - 33.0 pg    MCHC 32.6 31.5 - 36.5 g/dL    RDW 16.7 (H) 10.0 - 15.0 %    Platelet Count 192 150 - 450 10e3/uL   Troponin I    Collection Time: 04/01/22  7:00 AM   Result Value Ref Range    Troponin I 5.74 (HH) 0.00 - 0.29 ng/mL       Recent Results (from the past 24 hour(s))   XR Chest Port 1 View    Narrative    EXAM: XR CHEST PORT 1 VIEW  LOCATION: [facility]  DATE/TIME: 3/31/2022 6:45 PM    INDICATION: chest pain  COMPARISON: 06/13/2020      Impression    IMPRESSION: Cardiomegaly. Pulmonary vascularity normal. Vague, hazy densities again seen within the mid and lower left lung, similar to the prior study suggesting chronic disease. No new infiltrates or effusions.

## 2022-04-01 NOTE — ED NOTES
"[facility] ED Handoff Report    ED Chief Complaint: fatigue and not eating for past 5 days    ED Diagnosis:  (N17.9) Acute renal failure, unspecified acute renal failure type (H)  Comment:  Plan:     (E83.51) Hypocalcemia  Comment:   Plan:     (E87.6) Hypokalemia  Comment:   Plan:     (E83.42) Hypomagnesemia  Comment:   Plan:     (E87.2) Metabolic acidosis  Comment:   Plan:     (Z66) DNR (do not resuscitate)  Comment:   Plan:        PMH:    Past Medical History:   Diagnosis Date     CKD (chronic kidney disease)      Diabetes (H)      GERD (gastroesophageal reflux disease)      Hypertension      Latent tuberculosis      Zoster         Code Status:  No Order     Falls Risk: Yes Band: Applied    Current Living Situation/Residence: lives with their son or daughter     Elimination Status: Continent: No     Activity Level: 2 assist    Patients Preferred Language:  Other: deaf     Needed: Yes    Vital Signs:  BP (!) 161/76   Pulse 91   Temp 97.7  F (36.5  C) (Oral)   Resp 16   Ht 1.448 m (4' 9\")   SpO2 98%   BMI 17.31 kg/m       Cardiac Rhythm: NSR    Pain Score: Patient is unable to quantify.    Is the Patient Confused:  Yes    Last Food or Drink: 3/25/22 at unknown    Focused Assessment:  Decreased mental status.  Fatigue.    Tests Performed: Done: Labs and Imaging    Treatments Provided:  medications    Family Dynamics/Concerns: No        Plan of Care Communicated to Family: Yes    Who Was Updated about Plan of Care: son    Belongings Checklist Done and Signed by Patient: Yes    Medications sent with patient: none    Covid: symptomatic, result not back yet    Additional Information: son to come in this evening    RN: Aliyah Amaya   3/31/2022 8:28 PM     "

## 2022-04-01 NOTE — PLAN OF CARE
"  Problem: Plan of Care - These are the overarching goals to be used throughout the patient stay.    Goal: Optimal Comfort and Wellbeing  Outcome: Ongoing, Progressing     Problem: Gas Exchange Impaired  Goal: Optimal Gas Exchange  Outcome: Ongoing, Progressing     Problem: Oral Intake Inadequate  Goal: Optimal Oral Intake  Outcome: Ongoing, Progressing     Problem: Plan of Care - These are the overarching goals to be used throughout the patient stay.    Goal: Plan of Care Review/Shift Note  Description: The Plan of Care Review/Shift note should be completed every shift.  The Outcome Evaluation is a brief statement about your assessment that the patient is improving, declining, or no change.  This information will be displayed automatically on your shift note.  Outcome: Ongoing, Progressing  Flowsheets (Taken 4/1/2022 1415)  Plan of Care Reviewed With: patient  Overall Patient Progress: no change  Goal: Patient-Specific Goal (Individualized)  Description: You can add care plan individualizations to a care plan. Examples of Individualization might be:  \"Parent requests to be called daily at 9am for status\", \"I have a hard time hearing out of my right ear\", or \"Do not touch me to wake me up as it startles me\".  Outcome: Ongoing, Progressing  Flowsheets (Taken 4/1/2022 1415)  Individualized Care Needs: difficult to address due to language barrier  Goal: Absence of Hospital-Acquired Illness or Injury  Outcome: Ongoing, Progressing  Intervention: Identify and Manage Fall Risk  Recent Flowsheet Documentation  Taken 4/1/2022 0900 by Ramandeep Snyder, RN  Safety Promotion/Fall Prevention:   nonskid shoes/slippers when out of bed   bed alarm on  Intervention: Prevent and Manage VTE (Venous Thromboembolism) Risk  Recent Flowsheet Documentation  Taken 4/1/2022 0900 by Ramandeep Snyder, RN  Activity Management: bedrest  Goal: Optimal Comfort and Wellbeing  Outcome: Ongoing, Progressing  Goal: Readiness for Transition of " Care  Outcome: Ongoing, Not Progressing  Pt slept comfortably most of morning. Pt ate almost all of chicken noodle soup, drank 240cc juice, 120 water. Paged MD re wet breath sounds. Pt smiling and said thank you a few times. Bladder scanned earlier for 21cc. Continue to monitor. Pt sating 95%O2 RA.     Goal Outcome Evaluation:    Plan of Care Reviewed With: patient     Overall Patient Progress: no change

## 2022-04-02 NOTE — PLAN OF CARE
Problem: Gas Exchange Impaired  Goal: Optimal Gas Exchange  Outcome: Ongoing, Progressing     Problem: Oral Intake Inadequate  Goal: Optimal Oral Intake  Outcome: Ongoing, Progressing   Goal Outcome Evaluation:        Pt  Hmong speaking. A &O unable to assess orientation. Denied pain. On comfort care. Retained urine. Straight cath x 1. Had  a medium emesis x 1. Prn Zofran given and was effective.

## 2022-04-02 NOTE — PLAN OF CARE
Problem: Plan of Care - These are the overarching goals to be used throughout the patient stay.    Goal: Optimal Comfort and Wellbeing  4/2/2022 1841 by Ramandeep Snyder RN  Outcome: Ongoing, Not Progressing  4/2/2022 1218 by Ramandeep Snyder RN  Outcome: Ongoing, Progressing     Problem: Plan of Care - These are the overarching goals to be used throughout the patient stay.    Goal: Readiness for Transition of Care  4/2/2022 1841 by Ramandeep Snyder RN  Outcome: Ongoing, Not Progressing  4/2/2022 1218 by Ramandeep Snyder RN  Outcome: Ongoing, Progressing     Problem: Risk for Delirium  Goal: Optimal Coping  4/2/2022 1841 by Ramandeep Snyder RN  Outcome: Ongoing, Not Progressing  4/2/2022 1218 by Ramandeep Snyder RN  Outcome: Ongoing, Progressing  Goal: Improved Behavioral Control  4/2/2022 1841 by Ramandeep Snyder RN  Outcome: Ongoing, Not Progressing  4/2/2022 1218 by Ramandeep Snyder RN  Outcome: Ongoing, Progressing  Goal: Improved Attention and Thought Clarity  4/2/2022 1841 by Ramandeep Snyder RN  Outcome: Ongoing, Not Progressing  4/2/2022 1218 by Ramandeep Snyder RN  Outcome: Ongoing, Progressing  Goal: Improved Sleep  4/2/2022 1841 by Ramandeep Snyder RN  Outcome: Ongoing, Not Progressing  4/2/2022 1218 by Ramandeep Snyder RN  Outcome: Ongoing, Progressing     Problem: Oral Intake Inadequate  Goal: Optimal Oral Intake  4/2/2022 1841 by Ramandeep Snyder RN  Outcome: Ongoing, Not Progressing  4/2/2022 1218 by Ramandeep Snyder RN  Outcome: Ongoing, Progressing  Pt reaching out grabbing the air at times. Pt restless needing ativan late day shift, pt sleeping currently. Pt not waking for family to eat who are present at this time. Lee placed on day shift, monitoring output. Atropine gtts to be given now for wet breathe sounds. Continue to monitor.     Goal Outcome Evaluation:

## 2022-04-02 NOTE — PROGRESS NOTES
Clinical Nutrition Services Note    Pt is Comfort Care.  Clinical Nutrition Services will not follow unless dietitian is consulted.

## 2022-04-02 NOTE — SIGNIFICANT EVENT
Significant Event Note    Time of event: 7:52 PM April 1, 2022    Description of event:  Paged regarding family here to discuss goals of care.     Had a discussion regarding Vignesh's current state and the options moving forward. Family decided to pursue full comfort cares. They will continue having family discussion regarding home hospice vs. Hospice facility. They understand that comfort cares are directed towards comfort and not intended to sustain life.     Plan:  Orders changed to reflect comfort cares    Discussed with: bedside nurse    Eboni Dutta MD

## 2022-04-02 NOTE — PLAN OF CARE
"  Problem: Plan of Care - These are the overarching goals to be used throughout the patient stay.    Goal: Plan of Care Review/Shift Note  Description: The Plan of Care Review/Shift note should be completed every shift.  The Outcome Evaluation is a brief statement about your assessment that the patient is improving, declining, or no change.  This information will be displayed automatically on your shift note.  Outcome: Ongoing, Progressing  Goal: Patient-Specific Goal (Individualized)  Description: You can add care plan individualizations to a care plan. Examples of Individualization might be:  \"Parent requests to be called daily at 9am for status\", \"I have a hard time hearing out of my right ear\", or \"Do not touch me to wake me up as it startles me\".  Outcome: Ongoing, Progressing  Goal: Absence of Hospital-Acquired Illness or Injury  Outcome: Ongoing, Progressing  Goal: Optimal Comfort and Wellbeing  Outcome: Ongoing, Progressing  Goal: Readiness for Transition of Care  Outcome: Ongoing, Progressing     Problem: Risk for Delirium  Goal: Optimal Coping  Outcome: Ongoing, Progressing  Goal: Improved Behavioral Control  Outcome: Ongoing, Progressing  Goal: Improved Attention and Thought Clarity  Outcome: Ongoing, Progressing  Goal: Improved Sleep  Outcome: Ongoing, Progressing     Problem: Oral Intake Inadequate  Goal: Optimal Oral Intake  Outcome: Ongoing, Progressing  Pt awake to take pills, take sips, eat some food family brought in then back to sleep. Pt found restless at times and pulling off gown. Tylenol admin'd, ativan available prn. Lung sounds remain wet, coarse with pt sating 98%RA.  Daughter in law at bedside, cares explained. Will reattempt kruger placement as pt very tense rior.    Goal Outcome Evaluation:    Plan of Care Reviewed With: patient     Overall Patient Progress: no change           "

## 2022-04-02 NOTE — PROGRESS NOTES
SW following and will complete assessment as able and appropriate. SW spoke with [facility] hospice nurse, Monik Carey. She is aware of pt, they are following, but has not confirmed hospice staffing availability for this weekend yet. She reports plan to check in on pt when she visits the hospital as able.     SW placed call to pts son, pedrito Marcelino requesting call back.  SHANA Mena on 4/2/2022 at 10:05 AM

## 2022-04-02 NOTE — PLAN OF CARE
Problem: Plan of Care - These are the overarching goals to be used throughout the patient stay.    Goal: Absence of Hospital-Acquired Illness or Injury  Intervention: Identify and Manage Fall Risk  Recent Flowsheet Documentation  Taken 4/2/2022 0111 by Taylor Fernández RN  Safety Promotion/Fall Prevention:   assistive device/personal items within reach   bed alarm on   fall prevention program maintained   room door open   room near nurse's station   safety round/check completed   Goal Outcome Evaluation: No fall overnight. Alert at beginning of shift. In pain. Doesn't use call light. Bed alarm on, did not attempt to get out of bed.    Problem: Gas Exchange Impaired  Goal: Optimal Gas Exchange  Outcome: Ongoing, Not Progressing  Intervention: Optimize Oxygenation and Ventilation  Recent Flowsheet Documentation  Taken 4/2/2022 0111 by Taylor Fernández RN  Head of Bed (HOB) Positioning: HOB at 20 degrees  VSS on room air    Problem: Risk for Delirium  Goal: Improved Sleep  Outcome: Ongoing, Not Progressing  Pain controlled with prn Tylenol and Ativan. After administration, patient has slept quietly.    Patient on comfort cares. Repositioned, however, patient prefers to sleep on right side and positions herself to right.   0151 bladder scan=36mL.  0445 bladder hbrs=026sL

## 2022-04-02 NOTE — PROGRESS NOTES
[facility]    Progress Note - Hospitalist Service       Date of Admission:  3/31/2022    Assessment & Plan          Vignesh Fallon is a 86 year old female admitted on 3/31/2022. She has a history of hypertension, diabetes, CKD, and is admitted for somnolence over the past week, found to have multiple electrolyte abnormalities, acute renal failure, evidence of recent cardiac insult. Transitioned to comfort cares awaiting hospice placement.    Altered mental status - stuporous  Encephalopathy, metabolic  Goals of care: Comfort cares  Patient with very low GCS upon admission, remains mainly sedated.  Awakens briefly with touch, at times is more sedated.  Stopped eating about a week ago, hardly drinking anything.  Breathing appears slightly labored, rattling in the chest. Plan for  hospice care, concerned that she may need to go to hospice home versus returning to home with family on hospice. Discussion last evening with decision to pursue comfort cares.  -Hospice consulted, appreciate recommendation  -Comfort cares    Acute renal failure  Chronic kidney disease stage IV  Creatinine of 6.14 with GFR of 6 upon admission.   - Discontinuing lab work, comfort cares       Metabolic acidosis  Hypokalemia, resolved  Hypocalcemia  Hypomagnesemia  Multiple electrolyte abnormalities present upon admission, including potassium of 3.1, carbon dioxide of 9, calcium of 5.7 (albumin normal at 3.6), magnesium of 1.4.  Lactic acid 1.9.  Glucose 202.  Unclear etiology of her electrolyte derangements, however her acute renal failure is likely the primary , along with her absent oral intake over the past week.  She is also likely retaining CO2 based on her respiratory pattern.  Potassium, magnesium, calcium were replaced in the ED, and sodium bicarbonate was also given.   -Discontinuing lab work, comfort cares    Elevated troponin  NSTEMI  Elevated BNP  Holosystolic murmur  EKG without concerning ST changes.   However, with a new very loud holosystolic murmur, troponin of 7.65, and BNP of 545,concern for recent acute cardiac event leading to valvular compromise such as mitral regurgitation and resulting acute heart failure.    - Discontinuing lab work, comfort cares      Microcytic anemia  Hemoglobin of 7.7 upon admission.  This does appear to be a chronic problem for her, with prior hemoglobin values on file in the [facility].  This is likely multifactorial, due to longstanding chronic kidney disease, poor nutritional intake, potential GI bleeding with coffee-ground emesis witnessed by ER provider.  - Discontinuing lab work, comfort cares        Diet: Regular Diet Adult    DVT Prophylaxis: SCDs in place, will not use medical prophylaxis in the setting of anemia  Kruger Catheter: Placing kruger due to comfort cares  Fluids: None  Central Lines: None  Cardiac Monitoring: None  Code Status: No CPR- Do NOT Intubate      Disposition Plan   Expected Discharge: 04/02/2022     Anticipated discharge location: Discharge to hospice, awaiting home with hospice or discharge to skilled nursing facility with hospice.       The patient's care was discussed with the Attending Physician, Dr. Birmingham.     Anne Zurita MD  Hospitalist Service  [facility]    ______________________________________________________________________    Interval History   Discussion with night team about transitioning to comfort cares, orders placed. No acute events overnight. Resting comfortably this AM. Continues to respond to touch, though response is minimal.     Discussed case with sonRyland. Plans to visit later this evening. No questions or concerns at this time. Awaiting further coordination with hospice.    Data reviewed today: I reviewed all medications, new labs and imaging results over the last 24 hours. I personally reviewed no images or EKG's today.    Physical Exam   Vital Signs: Temp: 97.3  F (36.3  C) Temp src: Axillary BP:  98/58 Pulse: 68   Resp: 18 SpO2: 98 % O2 Device: None (Room air)    Weight: 74 lbs 1.6 oz    GEN: Patient laying in bed, sleeping.   Frail and cachectic appearing.   Does alert to touch with some movement, does not open eyes.  Does not communicate.  HEEN: Head is atraumatic, normocephalict.  Mucous membranes dry, keeps mouth open while breathing.  CV: Regular rate and rhythm.  Grade III/VI holosystolic murmur that radiates to the left axilla.  PULM: Lungs diminished.  Significant upper airway rattling sounds throughout bilateral upper lung fields, coarse breath sounds throughout all lung fields bilaterally  ABD: Soft, very thin.  NEURO:  Alerts minimally to touch, is able to move all extremities spontaneously.  Not able to follow commands.  Not conversing.  SKIN: No rashes, bruising, or other lesions.    Data      Recent Results (from the past 24 hour(s))   CBC with platelets    Collection Time: 04/02/22  6:02 AM   Result Value Ref Range    WBC Count 8.7 4.0 - 11.0 10e3/uL    RBC Count 2.42 (L) 3.80 - 5.20 10e6/uL    Hemoglobin 6.5 (LL) 11.7 - 15.7 g/dL    Hematocrit 19.9 (L) 35.0 - 47.0 %    MCV 82 78 - 100 fL    MCH 26.9 26.5 - 33.0 pg    MCHC 32.7 31.5 - 36.5 g/dL    RDW 16.7 (H) 10.0 - 15.0 %    Platelet Count 175 150 - 450 10e3/uL       No results found for this or any previous visit (from the past 24 hour(s)).

## 2022-04-03 NOTE — CONSULTS
Care Management Initial Consult    General Information  Assessment completed with: Ryland Stoddard  Type of CM/SW Visit: Initial Assessment    Primary Care Provider verified and updated as needed:     Readmission within the last 30 days:        Reason for Consult: discharge planning, end of life/hospice  Advance Care Planning:            Communication Assessment  Patient's communication style: spoken language (non-English)    Hearing Difficulty or Deaf: no (LIZETTE)   Wear Glasses or Blind: no    Cognitive  Cognitive/Neuro/Behavioral: .WDL except  Level of Consciousness: somnolent  Arousal Level: arouses to voice, arouses to repeated stimulation (few worsds to grandaughter present)  Orientation: other (see comments) (unable to speek)  Mood/Behavior: restless  Best Language: 3 - Mute  Speech: slow    Living Environment:   People in home: child(mei), adult     Current living Arrangements: house      Able to return to prior arrangements: no       Family/Social Support:  Care provided by: child(mei)  Provides care for: no one, unable/limited ability to care for self     Children          Description of Support System: Supportive, Involved    Support Assessment: Adequate family and caregiver support, Adequate social supports    Current Resources:   Patient receiving home care services:       Community Resources:    Equipment currently used at home: none  Supplies currently used at home:      Employment/Financial:  Employment Status:          Financial Concerns:             Lifestyle & Psychosocial Needs:  Social Determinants of Health     Tobacco Use: Low Risk      Smoking Tobacco Use: Never Smoker     Smokeless Tobacco Use: Never Used   Alcohol Use: Not on file   Financial Resource Strain: Not on file   Food Insecurity: Not on file   Transportation Needs: Not on file   Physical Activity: Not on file   Stress: Not on file   Social Connections: Not on file   Intimate Partner Violence: Not on file   Depression: Not at risk      PHQ-2 Score: 2   Housing Stability: Not on file                Additional Information:  CHANO spoke with pts son, Ryland, over the phone. He reports pt is from home with him and his family. Discussed current status and possibility that pt could leave the hospital for end of life. Ryland reports they would want pt to go to a nursing facility, not home, if pt able to leave. Discussed sending referrals and Ryland agreeable. He is agreeable to checking facilities closest to their home. Referrals sent to [facility], and [facility]. CHANO will continue to follow.    SHANA Mena

## 2022-04-03 NOTE — PLAN OF CARE
"  Problem: Plan of Care - These are the overarching goals to be used throughout the patient stay.    Goal: Plan of Care Review/Shift Note  Description: The Plan of Care Review/Shift note should be completed every shift.  The Outcome Evaluation is a brief statement about your assessment that the patient is improving, declining, or no change.  This information will be displayed automatically on your shift note.  Outcome: Ongoing, Not Progressing  Goal: Patient-Specific Goal (Individualized)  Description: You can add care plan individualizations to a care plan. Examples of Individualization might be:  \"Parent requests to be called daily at 9am for status\", \"I have a hard time hearing out of my right ear\", or \"Do not touch me to wake me up as it startles me\".  Outcome: Ongoing, Not Progressing  Goal: Absence of Hospital-Acquired Illness or Injury  Outcome: Ongoing, Not Progressing  Goal: Optimal Comfort and Wellbeing  Outcome: Ongoing, Not Progressing  Goal: Readiness for Transition of Care  Outcome: Ongoing, Not Progressing     Problem: End-of-Life Care  Goal: Comfort, Peace and Preserved Dignity  Outcome: Ongoing, Not Progressing  Pt  restless, moaning at times with cares. Pt picking at air with both arms. MS IV admin'd twice this shift, Ativan once offering some relief. Atropine gtts admin'd prn with some effectiveness.  Warm skin, dorsalis pedis pulses present. Difficult to assess if mottling due to skin color. Continue to monitor, offer emotional support to pt and family.    Goal Outcome Evaluation:                      "

## 2022-04-03 NOTE — PLAN OF CARE
Problem: End-of-Life Care  Goal: Comfort, Peace and Preserved Dignity  Outcome: Ongoing, Progressing   Goal Outcome Evaluation:      Educated family on expectations of end of life. Provided assistance to dress patient in cultural clothing. Provided pain management via IV. Patient reaches out often during shift and verbalizes when position being changed in bed. Catheter removed per family request. Active listening provided to family. Family remains at bedside.   Bianca Patel RN on 4/3/2022 at 1:21 AM

## 2022-04-03 NOTE — PLAN OF CARE
Pt appears comfortable, unresponsive to painful stim.  She has  regular, congested respirations.  Skin is warm and dry.  She has traveling clothing on and family have been in visiting.  Problem: Plan of Care - These are the overarching goals to be used throughout the patient stay.    Goal: Absence of Hospital-Acquired Illness or Injury  Intervention: Identify and Manage Fall Risk  Recent Flowsheet Documentation  Taken 4/3/2022 1723 by Isabella Huitron, RN  Safety Promotion/Fall Prevention: bed alarm on  Intervention: Prevent and Manage VTE (Venous Thromboembolism) Risk  Recent Flowsheet Documentation  Taken 4/3/2022 1723 by Isabella Huitron, RN  VTE Prevention/Management: SCDs (sequential compression devices) off  Activity Management: bedrest   Goal Outcome Evaluation:

## 2022-04-03 NOTE — PROVIDER NOTIFICATION
Patient showing signs of modeling in finger tips and toe beds. Increased retraction of legs withdrawn into abdomen slightly with a small amount of guarding. Lung sounds rhonchi and rattles throughout. Patient is actively dying. Please see new medication orders.   Bianca Patel RN on 4/2/2022 at 8:21 PM

## 2022-04-03 NOTE — PROGRESS NOTES
[facility]    Progress Note - Hospitalist Service       Date of Admission:  3/31/2022    Assessment & Plan          Vignesh Fallon is a 86 year old female admitted on 3/31/2022. She has a history of hypertension, diabetes, CKD, and is admitted for somnolence over the past week, found to have multiple electrolyte abnormalities, acute renal failure, evidence of recent cardiac insult. Transitioned to comfort cares awaiting hospice placement.    Altered mental status - stuporous  Encephalopathy, metabolic  Goals of care: Comfort cares  Patient with very low GCS upon admission, remains mainly sedated.  Awakens briefly with touch, at times is more sedated.  Stopped eating about a week ago, hardly drinking anything.  Breathing appears slightly labored, rattling in the chest. Plan for  hospice care, plan to discharge to facility.  -Hospice consulted, appreciate recommendation  -Comfort cares     Diet: Regular Diet Adult    DVT Prophylaxis: SCDs in place, will not use medical prophylaxis in the setting of anemia  Kruger Catheter: Placing kruger due to comfort cares  Fluids: None  Central Lines: None  Cardiac Monitoring: None  Code Status: No CPR- Do NOT Intubate      Disposition Plan   Expected Discharge: 04/04/2022     Anticipated discharge location: Discharge to hospice, awaiting home with hospice or discharge to skilled nursing facility with hospice.       The patient's care was discussed with the Attending Physician, Dr. Birmingham.     Anne Zurita MD  Hospitalist Service  [facility]    ______________________________________________________________________    Interval History   Last evening family came in and dressed patient in cultural garments.  Family reports that they were notified that she was decompensating, though she remained stable today on my exam.  This morning her granddaughter is at bedside.  We will continue to pursue transition to hospice at a hospice facility,  discussed end-of-life stages, questions of granddaughter answered.  Patient is reaching out, though resting in bed somewhat comfortably.  At times does open eyes.    Data reviewed today: I reviewed all medications, new labs and imaging results over the last 24 hours. I personally reviewed no images or EKG's today.    Physical Exam   Vital Signs:           Resp: 24        Weight: 74 lbs 1.6 oz    GEN: Patient laying in bed, sleeping, though at times alert and slightly anxious appearing.   Frail and cachectic appearing.   Does not communicate.  HEEN: Head is atraumatic, normocephalic.  Mucous membranes dry, keeps mouth open while breathing.  CV: Regular rate and rhythm.  Grade III/VI holosystolic murmur that radiates to the left axilla.  PULM: Lungs diminished.  Significant upper airway rattling sounds throughout bilateral upper lung fields, coarse breath sounds throughout all lung fields bilaterally  ABD: Soft, very thin.  NEURO:  Alerts minimally to touch, is able to move all extremities spontaneously.  Not able to follow commands.  Not conversing.  SKIN: No rashes, bruising, or other lesions.    Data      No results found for this or any previous visit (from the past 24 hour(s)).    No results found for this or any previous visit (from the past 24 hour(s)).

## 2022-04-04 PROBLEM — J12.82 PNEUMONIA DUE TO COVID-19 VIRUS: Status: RESOLVED | Noted: 2020-06-14 | Resolved: 2022-01-01

## 2022-04-04 PROBLEM — U07.1 PNEUMONIA DUE TO COVID-19 VIRUS: Status: RESOLVED | Noted: 2020-06-14 | Resolved: 2022-01-01

## 2022-04-04 NOTE — DEATH PRONOUNCEMENT
MD DEATH PRONOUNCEMENT  House officer called to pronounce Vignesh Fallon dead. Patient unresponsive to verbal and tactile stimuli.  No heart sounds heard, no pulse felt. No spontaneous respirations.  Pupils fixed and dilated. Patient pronounced dead at 0221 on 4/4/2022. Nursing staff to notify family and primary doctor.      Jimmy Hernandez MD  [facility]  Pager # [phone]

## 2022-04-04 NOTE — DISCHARGE SUMMARY
[facility]    Death Summary - Medicine & Pediatrics    Date of Admission:  3/31/2022  Date of Death: 4/4/2022  4:50 AM  Attending Provider: Gail Mendoza MD    Discharge Diagnoses    Acute renal failure  Metabolic acidosis  Hypomagnesemia  Hypokalemia  Hypocalcemia    Cause of death: Acute renal failure, NSTEMI    Hospital Course   Vignesh Fallon was admitted on 3/31/2022 for multiple system organ failure, NSTEMI, acute renal failure.  The following problems were addressed during her hospitalization:    Acute renal failure  Metabolic acidosis with electrolyte derangement  Elevated troponin, NSTEMI  Microcytic anemia  Multisystem organ failure  Vignesh presented from home with acute encephalopathy due to metabolic derangement.  Found to have elevated creatinine of 6.14 on admission with a GFR of 6, determined to be acute renal failure in the setting of chronic kidney disease stage IV.  Also noted to have metabolic acidosis hypokalemia hypocalcemia and hypomagnesemia all likely due to her acute renal failure.  She is also noted to have an elevated troponin of 7.65, but with no EKG changes.  Likely had undergone a recent NSTEMI.  She also presented with microcytic anemia with a hemoglobin of 7.7.  With all of these severe laboratory derangements, goals of care discussion was had with her family, on day 1 of admission family decided to pursue comfort care measures for Vignesh.  She was kept comfortable until her passing in the early morning of April 4, 2022.    Anne Zurita MD  [facility]    ______________________________________________________________________      Significant Results and Procedures      Recent Results (from the past 240 hour(s))   Troponin I    Collection Time: 03/31/22  6:33 PM   Result Value Ref Range    Troponin I 7.65 (HH) 0.00 - 0.29 ng/mL   Magnesium    Collection Time: 03/31/22  6:33 PM   Result Value Ref Range    Magnesium 1.4 (L) 1.8 - 2.6 mg/dL    Comprehensive metabolic panel    Collection Time: 03/31/22  6:33 PM   Result Value Ref Range    Sodium 139 136 - 145 mmol/L    Potassium 3.1 (L) 3.5 - 5.0 mmol/L    Chloride 115 (H) 98 - 107 mmol/L    Carbon Dioxide (CO2) 9 (LL) 22 - 31 mmol/L    Anion Gap 15 5 - 18 mmol/L    Urea Nitrogen 66 (H) 8 - 28 mg/dL    Creatinine 6.14 (HH) 0.60 - 1.10 mg/dL    Calcium 5.7 (LL) 8.5 - 10.5 mg/dL    Glucose 202 (H) 70 - 125 mg/dL    Alkaline Phosphatase 492 (H) 45 - 120 U/L    AST 14 0 - 40 U/L    ALT <9 0 - 45 U/L    Protein Total 6.9 6.0 - 8.0 g/dL    Albumin 3.6 3.5 - 5.0 g/dL    Bilirubin Total 0.5 0.0 - 1.0 mg/dL    GFR Estimate 6 (L) >60 mL/min/1.73m2   B-Type Natriuretic Peptide ([facility])    Collection Time: 03/31/22  6:33 PM   Result Value Ref Range     (H) 0 - 167 pg/mL   TSH with free T4 reflex    Collection Time: 03/31/22  6:33 PM   Result Value Ref Range    TSH 0.86 0.30 - 5.00 uIU/mL   Lactic acid whole blood    Collection Time: 03/31/22  6:33 PM   Result Value Ref Range    Lactic Acid 1.9 0.7 - 2.0 mmol/L   CBC with platelets and differential    Collection Time: 03/31/22  6:33 PM   Result Value Ref Range    WBC Count 8.1 4.0 - 11.0 10e3/uL    RBC Count 2.92 (L) 3.80 - 5.20 10e6/uL    Hemoglobin 7.7 (L) 11.7 - 15.7 g/dL    Hematocrit 24.5 (L) 35.0 - 47.0 %    MCV 84 78 - 100 fL    MCH 26.4 (L) 26.5 - 33.0 pg    MCHC 31.4 (L) 31.5 - 36.5 g/dL    RDW 16.8 (H) 10.0 - 15.0 %    Platelet Count 212 150 - 450 10e3/uL    % Neutrophils 77 %    % Lymphocytes 16 %    % Monocytes 6 %    % Eosinophils 0 %    % Basophils 0 %    % Immature Granulocytes 1 %    NRBCs per 100 WBC 0 <1 /100    Absolute Neutrophils 6.3 1.6 - 8.3 10e3/uL    Absolute Lymphocytes 1.3 0.8 - 5.3 10e3/uL    Absolute Monocytes 0.5 0.0 - 1.3 10e3/uL    Absolute Eosinophils 0.0 0.0 - 0.7 10e3/uL    Absolute Basophils 0.0 0.0 - 0.2 10e3/uL    Absolute Immature Granulocytes 0.1 <=0.4 10e3/uL    Absolute NRBCs 0.0 10e3/uL   ECG 12-Lead with MUSE -  MIKKI,SOHAN,[facility]    Collection Time: 03/31/22  7:18 PM   Result Value Ref Range    Systolic Blood Pressure 160 mmHg    Diastolic Blood Pressure 117 mmHg    Ventricular Rate 99 BPM    Atrial Rate 99 BPM    HI Interval 208 ms    QRS Duration 78 ms     ms    QTc 454 ms    P Axis 38 degrees    R AXIS -44 degrees    T Axis 69 degrees    Interpretation ECG       Sinus rhythm  Left axis deviation  Left ventricular hypertrophy with repolarization abnormality  Abnormal ECG  When compared with ECG of 13-JUN-2020 07:58,  QRS axis Shifted left  Nonspecific T wave abnormality no longer evident in Inferior leads  Nonspecific T wave abnormality now evident in Lateral leads  Confirmed by SEE ED PROVIDER NOTE FOR, ECG INTERPRETATION ([id number]),  DANA ROBLES ([id number]) on 4/1/2022 7:06:18 AM     Asymptomatic COVID-19 Virus (Coronavirus) by PCR Nasopharyngeal    Collection Time: 03/31/22  8:26 PM    Specimen: Nasopharyngeal; Swab   Result Value Ref Range    SARS CoV2 PCR Negative Negative   UA with Microscopic reflex to Culture    Collection Time: 04/01/22  2:16 AM    Specimen: Urine, Catheter   Result Value Ref Range    Color Urine Light Yellow Colorless, Straw, Light Yellow, Yellow    Appearance Urine Clear Clear    Glucose Urine 70  (A) Negative mg/dL    Bilirubin Urine Negative Negative    Ketones Urine Negative Negative mg/dL    Specific Gravity Urine 1.012 1.001 - 1.030    Blood Urine 0.2 mg/dL (A) Negative    pH Urine 6.0 5.0 - 7.0    Protein Albumin Urine 100  (A) Negative mg/dL    Urobilinogen Urine <2.0 <2.0 mg/dL    Nitrite Urine Negative Negative    Leukocyte Esterase Urine Negative Negative    Bacteria Urine None Seen None Seen /HPF    RBC Urine 0 <=2 /HPF    WBC Urine 1 <=5 /HPF   Blood gas venous    Collection Time: 04/01/22  7:00 AM   Result Value Ref Range    pH Venous 7.09 (LL) 7.35 - 7.45    pCO2 Venous 35 35 - 50 mm Hg    pO2 Venous 61 (H) 25 - 47 mm Hg    Bicarbonate Venous 11 (L) 24 - 30 mmol/L    Base  Excess/Deficit (+/-) -19.4   mmol/L    Oxyhemoglobin Venous 88.7 (H) 70.0 - 75.0 %    O2 Sat, Venous 90.1 (H) 70.0 - 75.0 %   Comprehensive metabolic panel    Collection Time: 04/01/22  7:00 AM   Result Value Ref Range    Sodium 142 136 - 145 mmol/L    Potassium 3.5 3.5 - 5.0 mmol/L    Chloride 119 (H) 98 - 107 mmol/L    Carbon Dioxide (CO2) 9 (LL) 22 - 31 mmol/L    Anion Gap 14 5 - 18 mmol/L    Urea Nitrogen 64 (H) 8 - 28 mg/dL    Creatinine 5.79 (H) 0.60 - 1.10 mg/dL    Calcium 5.7 (LL) 8.5 - 10.5 mg/dL    Glucose 131 (H) 70 - 125 mg/dL    Alkaline Phosphatase 441 (H) 45 - 120 U/L    AST 12 0 - 40 U/L    ALT <9 0 - 45 U/L    Protein Total 6.3 6.0 - 8.0 g/dL    Albumin 3.2 (L) 3.5 - 5.0 g/dL    Bilirubin Total 0.5 0.0 - 1.0 mg/dL    GFR Estimate 7 (L) >60 mL/min/1.73m2   CBC with platelets    Collection Time: 04/01/22  7:00 AM   Result Value Ref Range    WBC Count 8.9 4.0 - 11.0 10e3/uL    RBC Count 2.72 (L) 3.80 - 5.20 10e6/uL    Hemoglobin 7.3 (L) 11.7 - 15.7 g/dL    Hematocrit 22.4 (L) 35.0 - 47.0 %    MCV 82 78 - 100 fL    MCH 26.8 26.5 - 33.0 pg    MCHC 32.6 31.5 - 36.5 g/dL    RDW 16.7 (H) 10.0 - 15.0 %    Platelet Count 192 150 - 450 10e3/uL   Troponin I    Collection Time: 04/01/22  7:00 AM   Result Value Ref Range    Troponin I 5.74 (HH) 0.00 - 0.29 ng/mL   CBC with platelets    Collection Time: 04/02/22  6:02 AM   Result Value Ref Range    WBC Count 8.7 4.0 - 11.0 10e3/uL    RBC Count 2.42 (L) 3.80 - 5.20 10e6/uL    Hemoglobin 6.5 (LL) 11.7 - 15.7 g/dL    Hematocrit 19.9 (L) 35.0 - 47.0 %    MCV 82 78 - 100 fL    MCH 26.9 26.5 - 33.0 pg    MCHC 32.7 31.5 - 36.5 g/dL    RDW 16.7 (H) 10.0 - 15.0 %    Platelet Count 175 150 - 450 10e3/uL       Results for orders placed or performed during the hospital encounter of 03/31/22   XR Chest Port 1 View    Narrative    EXAM: XR CHEST PORT 1 VIEW  LOCATION: [facility]  DATE/TIME: 3/31/2022 6:45 PM    INDICATION: chest pain  COMPARISON:  06/13/2020      Impression    IMPRESSION: Cardiomegaly. Pulmonary vascularity normal. Vague, hazy densities again seen within the mid and lower left lung, similar to the prior study suggesting chronic disease. No new infiltrates or effusions.       Consultations This Hospital Stay   CARDIOLOGY IP CONSULT  INPATIENT HOSPICE ADULT CONSULT  SPIRITUAL HEALTH SERVICES IP CONSULT  SOCIAL WORK IP CONSULT    Primary Care Physician   Anne Zurita